# Patient Record
Sex: FEMALE | Race: WHITE | NOT HISPANIC OR LATINO | Employment: FULL TIME | URBAN - METROPOLITAN AREA
[De-identification: names, ages, dates, MRNs, and addresses within clinical notes are randomized per-mention and may not be internally consistent; named-entity substitution may affect disease eponyms.]

---

## 2017-03-27 ENCOUNTER — OFFICE VISIT (OUTPATIENT)
Dept: URGENT CARE | Age: 32
End: 2017-03-27
Payer: COMMERCIAL

## 2017-03-27 PROCEDURE — 99213 OFFICE O/P EST LOW 20 MIN: CPT | Performed by: FAMILY MEDICINE

## 2017-03-27 PROCEDURE — S9088 SERVICES PROVIDED IN URGENT: HCPCS | Performed by: FAMILY MEDICINE

## 2017-07-22 ENCOUNTER — TRANSCRIBE ORDERS (OUTPATIENT)
Dept: LAB | Facility: HOSPITAL | Age: 32
End: 2017-07-22

## 2017-07-22 ENCOUNTER — APPOINTMENT (OUTPATIENT)
Dept: LAB | Facility: HOSPITAL | Age: 32
End: 2017-07-22
Payer: COMMERCIAL

## 2017-07-22 DIAGNOSIS — E03.9 UNSPECIFIED HYPOTHYROIDISM: Primary | ICD-10-CM

## 2017-07-22 DIAGNOSIS — E03.9 UNSPECIFIED HYPOTHYROIDISM: ICD-10-CM

## 2017-07-22 DIAGNOSIS — Z00.8 HEALTH EXAMINATION IN POPULATION SURVEY: ICD-10-CM

## 2017-07-22 DIAGNOSIS — Z00.8 HEALTH EXAMINATION IN POPULATION SURVEY: Primary | ICD-10-CM

## 2017-07-22 LAB
CHOLEST SERPL-MCNC: 128 MG/DL (ref 50–200)
EST. AVERAGE GLUCOSE BLD GHB EST-MCNC: 111 MG/DL
HBA1C MFR BLD: 5.5 % (ref 4.2–6.3)
HDLC SERPL-MCNC: 43 MG/DL (ref 40–60)
LDLC SERPL CALC-MCNC: 69 MG/DL (ref 0–100)
TRIGL SERPL-MCNC: 80 MG/DL
TSH SERPL DL<=0.05 MIU/L-ACNC: 0.4 UIU/ML (ref 0.36–3.74)

## 2017-07-22 PROCEDURE — 36415 COLL VENOUS BLD VENIPUNCTURE: CPT

## 2017-07-22 PROCEDURE — 83036 HEMOGLOBIN GLYCOSYLATED A1C: CPT

## 2017-07-22 PROCEDURE — 80061 LIPID PANEL: CPT

## 2017-07-22 PROCEDURE — 84443 ASSAY THYROID STIM HORMONE: CPT

## 2017-07-28 ENCOUNTER — GENERIC CONVERSION - ENCOUNTER (OUTPATIENT)
Dept: OTHER | Facility: OTHER | Age: 32
End: 2017-07-28

## 2017-08-15 ENCOUNTER — ALLSCRIPTS OFFICE VISIT (OUTPATIENT)
Dept: OTHER | Facility: OTHER | Age: 32
End: 2017-08-15

## 2017-08-15 DIAGNOSIS — E03.9 HYPOTHYROIDISM: ICD-10-CM

## 2017-09-12 ENCOUNTER — LAB REQUISITION (OUTPATIENT)
Dept: LAB | Facility: HOSPITAL | Age: 32
End: 2017-09-12
Payer: COMMERCIAL

## 2017-09-12 DIAGNOSIS — Z01.419 ENCOUNTER FOR GYNECOLOGICAL EXAMINATION WITHOUT ABNORMAL FINDING: ICD-10-CM

## 2017-09-12 PROCEDURE — G0145 SCR C/V CYTO,THINLAYER,RESCR: HCPCS | Performed by: NURSE PRACTITIONER

## 2017-09-12 PROCEDURE — 87624 HPV HI-RISK TYP POOLED RSLT: CPT | Performed by: NURSE PRACTITIONER

## 2017-09-14 LAB — HPV RRNA GENITAL QL NAA+PROBE: NORMAL

## 2017-09-18 LAB
LAB AP GYN PRIMARY INTERPRETATION: NORMAL
LAB AP LMP: NORMAL
Lab: NORMAL

## 2017-10-02 DIAGNOSIS — E03.9 HYPOTHYROIDISM: ICD-10-CM

## 2017-10-02 DIAGNOSIS — M67.432 GANGLION OF LEFT WRIST: ICD-10-CM

## 2017-10-04 ENCOUNTER — TRANSCRIBE ORDERS (OUTPATIENT)
Dept: LAB | Facility: CLINIC | Age: 32
End: 2017-10-04

## 2017-10-04 ENCOUNTER — APPOINTMENT (OUTPATIENT)
Dept: LAB | Facility: CLINIC | Age: 32
End: 2017-10-04
Payer: COMMERCIAL

## 2017-10-04 DIAGNOSIS — E03.9 HYPOTHYROIDISM: ICD-10-CM

## 2017-10-04 LAB
ALBUMIN SERPL BCP-MCNC: 3.8 G/DL (ref 3.5–5)
ALP SERPL-CCNC: 70 U/L (ref 46–116)
ALT SERPL W P-5'-P-CCNC: 24 U/L (ref 12–78)
ANION GAP SERPL CALCULATED.3IONS-SCNC: 7 MMOL/L (ref 4–13)
AST SERPL W P-5'-P-CCNC: 23 U/L (ref 5–45)
BILIRUB SERPL-MCNC: 0.29 MG/DL (ref 0.2–1)
BUN SERPL-MCNC: 14 MG/DL (ref 5–25)
CALCIUM SERPL-MCNC: 9.1 MG/DL (ref 8.3–10.1)
CHLORIDE SERPL-SCNC: 105 MMOL/L (ref 100–108)
CO2 SERPL-SCNC: 25 MMOL/L (ref 21–32)
CREAT SERPL-MCNC: 0.84 MG/DL (ref 0.6–1.3)
ERYTHROCYTE [DISTWIDTH] IN BLOOD BY AUTOMATED COUNT: 12.5 % (ref 11.6–15.1)
GFR SERPL CREATININE-BSD FRML MDRD: 91 ML/MIN/1.73SQ M
GLUCOSE P FAST SERPL-MCNC: 84 MG/DL (ref 65–99)
HCT VFR BLD AUTO: 40.8 % (ref 34.8–46.1)
HGB BLD-MCNC: 13.3 G/DL (ref 11.5–15.4)
MCH RBC QN AUTO: 29.3 PG (ref 26.8–34.3)
MCHC RBC AUTO-ENTMCNC: 32.6 G/DL (ref 31.4–37.4)
MCV RBC AUTO: 90 FL (ref 82–98)
PLATELET # BLD AUTO: 249 THOUSANDS/UL (ref 149–390)
PMV BLD AUTO: 11.9 FL (ref 8.9–12.7)
POTASSIUM SERPL-SCNC: 4.2 MMOL/L (ref 3.5–5.3)
PROT SERPL-MCNC: 8.4 G/DL (ref 6.4–8.2)
RBC # BLD AUTO: 4.54 MILLION/UL (ref 3.81–5.12)
SODIUM SERPL-SCNC: 137 MMOL/L (ref 136–145)
TSH SERPL DL<=0.05 MIU/L-ACNC: 0.02 UIU/ML (ref 0.36–3.74)
WBC # BLD AUTO: 6.71 THOUSAND/UL (ref 4.31–10.16)

## 2017-10-04 PROCEDURE — 80053 COMPREHEN METABOLIC PANEL: CPT

## 2017-10-04 PROCEDURE — 85027 COMPLETE CBC AUTOMATED: CPT

## 2017-10-04 PROCEDURE — 36415 COLL VENOUS BLD VENIPUNCTURE: CPT

## 2017-10-04 PROCEDURE — 84443 ASSAY THYROID STIM HORMONE: CPT

## 2017-10-25 ENCOUNTER — ALLSCRIPTS OFFICE VISIT (OUTPATIENT)
Dept: OTHER | Facility: OTHER | Age: 32
End: 2017-10-25

## 2017-10-26 NOTE — PROGRESS NOTES
Assessment  1  Ganglion of left wrist (723 41) (Q11 626)    Plan  Ganglion of left wrist    · Continue with our present treatment plan ; Status:Complete;   Done: [id number]   · Follow-up visit in 2 months Evaluation and Treatment  Follow-up  Status: Complete   Done: 59ZOM7358   · US MSK GUIDANCE; Status:Active - Retrospective Authorization; Requested  [id number];     Discussion/Summary    Patient with ganglion cyst of left volar wrist, radial side  The anatomy and physiology of the ganglion was discussed with the patient today in the office  Fluid leaking out of the joint surface typically creates a small sac, which can enlarge and cause pain or limitation of motion  Treatment options include observation, aspiration, or surgical incision were discussed with the patient today  Observation typically lead to resolution and approximately 10% of patients, aspiration least resolution approximately 50% of patients, and surgical excision lead to resolution in approximately 97% of patients  After discussion with the patient today, the patient voiced understanding of all treatment options         this time, patient would like to proceed with aspiration  Because this is right along the area of the radial artery, we will have this aspirated under ultrasound guidance  Follow up in 2 months only if needed  Chief Complaint  1  Wrist Pain    History of Present Illness  HPI: Patient is a pleasant 68-year-old female who presents here today with complaints of a cyst on her left wrist  She states she has had this for approximately 3 years  Denies any obvious change in appearance  States he can occasionally cause some discomfort when pressure is applied to this area  Describes occasional bouts of numbness and tingling but has not had any of these recently  Review of Systems    Constitutional: No fever, no chills, feels well, no tiredness, no recent weight gain or loss  Eyes: No complaints of eyesight problems, no red eyes  ENT: no loss of hearing, no nosebleeds, no sore throat  Cardiovascular: No complaints of chest pain, no palpitations, no leg claudication or lower extremity edema  Respiratory: no compliants of shortness of breath, no wheezing, no cough  Gastrointestinal: no complaints of abdominal pain, no constipation, no nausea or diarrhea, no vomiting, no bloody stools  Genitourinary: no complaints of dysuria, no incontinence  Musculoskeletal: as noted in HPI  Integumentary: no complaints of skin rash or lesion, no itching or dry skin, no skin wounds  Neurological: no complaints of headache, no confusion, no numbness or tingling, no dizziness  Endocrine: No complaints of muscle weakness, no feelings of weakness, no frequent urination, no excessive thirst    Psychiatric: No suicidal thoughts, no anxiety, no feelings of depression  Active Problems  1  Ganglion of left wrist (727 41) (M67 432)   2  Hypothyroidism (244 9) (E03 9)   3  Insomnia (780 52) (G47 00)   4  Seizure disorder (345 90) (G40 909)    Past Medical History   · History of Encounter for pre-employment examination (V70 5) (Z02 1)   · History of gastroenteritis (V12 79) (Z87 19)   · History of seizure (V12 49) (J69 613)   · History of thyroid disease (V12 29) (Z86 39)   · History of viral gastroenteritis (V12 09) (Z86 19)   · History of Plantar fasciitis, right (728 71) (M72 2)    The active problems and past medical history were reviewed and updated today  Surgical History   · History of  Section   · History of Oral Surgery Tooth Extraction [place] Tooth    The surgical history was reviewed and updated today  Family History   · No pertinent family history   · No pertinent family history   · Family history of malignant neoplasm (V16 9) (Z80 9)   · Family history of diabetes mellitus (V18 0) (Z83 3)   · Family history of skin cancer (V16 8) (Z80 8)    The family history was reviewed and updated today         Social History   · Being A Social Drinker   · Caffeine Use   · Never a smoker   · No illicit drug use   · Uses Safety Equipment - Seatbelts  The social history was reviewed and updated today  The social history was reviewed and is unchanged  Current Meds   1  Levothyroxine Sodium 50 MCG Oral Tablet; TAKE 1 TABLET DAILY; Therapy: 45OIX3409 to (Evaluate:03Jan2018)  Requested for: 10XDV5496; Last   Rx:05Oct2017 Ordered    The medication list was reviewed and updated today  Allergies  1  No Known Drug Allergies  2  No Known Food Allergies   3  Seasonal    Vitals  Signs   Heart Rate: 68  Systolic: 040  Diastolic: 78  Height: 5 ft 3 in  Weight: 178 lb   BMI Calculated: 31 53  BSA Calculated: 1 84    Physical Exam      General: No acute distress, age-appropriate  Neck: Supple, trachea midline  HEENT: Normocephalic atraumatic, mucous membranes are moist, sclera are nonicteric  Cardiovascular: No discernable arrhythmia  Respiratory: Breathing is even and unlabored, no stridor or audible wheezing  Psychiatric: Awake alert and oriented x3, normal mood and affect  Abdomen: Without rebound or guarding  L Wrist Examination: (Patient with a mass approximately 1 to 1-1/2 cm in length along the volar radial aspect of her wrist  This is soft to palpation, non mobile  Patient is able to move her fingers and thumb without any obvious crepitation felt  Radial artery is felt just proximal to this and it seems to run right below this area)       Future Appointments    Date/Time Provider Specialty Site   12/13/2017 05:15 PM MARISABEL Okeefe   Orthopedic Surgery [address]   12/19/2017 09:30 AM Chico Servin MD Internal Medicine [facility]     Signatures   Electronically signed by : Charlette Nowak, [facility]; Oct 25 2017  9:18AM EST                       (Author)    Electronically signed by : MARISABEL Santana ; Oct 25 2017  1:04PM EST                       (Author)

## 2017-10-30 ENCOUNTER — ALLSCRIPTS OFFICE VISIT (OUTPATIENT)
Dept: OTHER | Facility: OTHER | Age: 32
End: 2017-10-30

## 2017-11-01 NOTE — PROGRESS NOTES
Assessment  1  Acute frontal sinusitis (461 1) (J01 10)    Plan  Acute frontal sinusitis    · Amoxicillin-Pot Clavulanate 875-125 MG Oral Tablet; Take one tablet twice daily for  7 days   · Apply warm moist compresses to the affected area 3 times a day for 5 minutes ;  Status:Complete;   Done: 33SKW5876   · Drink at least 6 glasses of water or juice a day ; Status:Complete;   Done: 88MUC7421   · How to use a nasal spray ; Status:Complete;   Done: 86DSR0326   · Irrigate your nose twice a day ; Status:Complete;   Done: 80IKZ9463   · Taking a hot steamy shower may help your condition ; Status:Complete;   Done:  63HPU5147   · Call [phone] if: The fever has not gone away in 2 days ; Status:Complete;   Done:  60LJF5642   · Call [phone] if: The sinus pain is not better in 1 week ; Status:Complete;   Done:  35YBL7596   · Call [phone] if: The symptoms are not better in 7 days ; Status:Complete;   Done:  35CCS7742   · Call [phone] if: The symptoms come back after the medications are finished ;  Status:Complete;   Done: 60Hec7665   · Call [phone] if: Your sinus pain is worse ; Status:Complete;   Done: 49IDU0067   · Seek Immediate Medical Attention if: You have a fever, headache, and vomiting, or have a  stiff neck ; Status:Complete;   Done: 47RFG3072   · Seek Immediate Medical Attention if: You have a severe headache that will not go away ;  Status:Complete;   Done: 30Oct2017   · Seek Immediate Medical Attention if: You have signs of infection in or around the affected  area ; Status:Complete;   Done: 31KRX4798    Discussion/Summary    Rest  Take full course abx  stay well hydrated  warm compress to sinus  return for new/worsening s/sx  The patient was counseled regarding instructions for management,-- risk factor reductions,-- prognosis,-- patient and family education        Chief Complaint  Pt presents for sinus pressure, cough, headache, always coldlast Saturday      History of Present Illness  HPI: Pt here for sinus congestion  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/sx  note, pt did get her influenza vaccine this year  Cold Symptoms:   Raymundo Soni presents with complaints of gradual onset of constant episodes of mild cold symptoms Episodes started October 28, 2017 (Pt states her son and  have also had s/sx)   Associated symptoms include dry cough,-- facial pressure,-- facial pain-- and-- headache, but-- no runny nose,-- no scratchy throat,-- no sore throat,-- no productive cough,-- no ear pain,-- no nausea,-- no vomiting,-- no fever-- and-- no chills  The patient presents with complaints of nasal congestion (yellow)      Review of Systems    Constitutional: feeling poorly-- and-- feeling tired, but-- no fever-- and-- no chills  ENT: as noted in HPI  Cardiovascular: no chest pain  Respiratory: cough, but-- no shortness of breath  Gastrointestinal: no nausea-- and-- no vomiting  Musculoskeletal: no arthralgias-- and-- no myalgias  Integumentary: no rashes  Neurological: headache  Active Problems  1  Ganglion of left wrist (727 41) (M67 432)   2  Hypothyroidism (244 9) (E03 9)   3  Insomnia (780 52) (G47 00)   4  Seizure disorder (345 90) (G40 909)    Past Medical History  Active Problems And Past Medical History Reviewed: The active problems and past medical history were reviewed and updated today  Surgical History  Surgical History Reviewed: The surgical history was reviewed and updated today  Social History   · Being A Social Drinker   · Caffeine Use   · Never a smoker   · No illicit drug use   · Uses Safety Equipment - Seatbelts  The social history was reviewed and updated today  The social history was reviewed and is unchanged  Family History  Family History Reviewed: The family history was reviewed and updated today  Current Meds   1   Levothyroxine Sodium 50 MCG Oral Tablet; TAKE 1 TABLET DAILY; Therapy: 79JVQ1488 to (Evaluate:03Jan2018)  Requested for: 50DEH7172; Last   Rx:05Oct2017 Ordered    The medication list was reviewed and updated today  Allergies  1  No Known Drug Allergies  2  No Known Food Allergies   3  Seasonal    Vitals   Recorded: 14SIO0039 01:26PM   Temperature 99 2 F, Tympanic   Heart Rate 70   Systolic 246, LUE, Sitting   Diastolic 78, LUE, Sitting   BP CUFF SIZE Large   Height 5 ft 3 in   Weight 176 lb 12 8 oz   BMI Calculated 31 32   BSA Calculated 1 84   O2 Saturation 98, RA     Physical Exam    Constitutional   General appearance: No acute distress, well appearing and well nourished  Eyes   Conjunctiva and lids: No swelling, erythema or discharge  Pupils and irises: Equal, round and reactive to light  Ears, Nose, Mouth, and Throat   External inspection of ears and nose: Normal     Otoscopic examination: Tympanic membranes translucent with normal light reflex  Canals patent without erythema  Nasal mucosa, septum, and turbinates: Abnormal   There was a mucoid discharge from both nares  The bilateral nasal mucosa was boggy,-- crusted-- and-- edematous  -- + TTP to frontal sinus  Oropharynx: Abnormal  -- + PND  + cobblestone irritation  no distinct erythema or exudate  + MMM  Pulmonary   Respiratory effort: No increased work of breathing or signs of respiratory distress  Auscultation of lungs: Clear to auscultation  -- no rhonchi or wheezing  Cardiovascular   Auscultation of heart: Normal rate and rhythm, normal S1 and S2, without murmurs  Lymphatic   Palpation of lymph nodes in neck: No lymphadenopathy  Skin   Skin and subcutaneous tissue: Normal without rashes or lesions  Psychiatric   Orientation to person, place, and time: Normal     Mood and affect: Normal          Future Appointments    Date/Time Provider Specialty Site   12/13/2017 05:15 PM MARISABEL Alarcon   Orthopedic Surgery [facility] [place]   12/19/2017 09:30 AM Arben Dorado MD Internal Medicine [facility]     Signatures   Electronically signed by : Edvin Fitch, [address]; Oct 30 2017  2:10PM EST                       (Author)    Electronically signed by :  Emerita Matthew MD; Oct 31 2017  9:01AM EST                       (Author)

## 2017-11-02 ENCOUNTER — HOSPITAL ENCOUNTER (OUTPATIENT)
Dept: RADIOLOGY | Facility: HOSPITAL | Age: 32
Discharge: HOME | End: 2017-11-02
Payer: COMMERCIAL

## 2017-11-02 DIAGNOSIS — M67.432 GANGLION OF LEFT WRIST: ICD-10-CM

## 2017-11-02 PROCEDURE — 20604 DRAIN/INJ JOINT/BURSA W/US: CPT

## 2017-11-02 PROCEDURE — 76942 ECHO GUIDE FOR BIOPSY: CPT

## 2017-11-02 PROCEDURE — 76881 US COMPL JOINT R-T W/IMG: CPT

## 2017-11-02 RX ORDER — LIDOCAINE HYDROCHLORIDE 10 MG/ML
5 INJECTION, SOLUTION INFILTRATION; PERINEURAL ONCE
Status: COMPLETED | OUTPATIENT
Start: 2017-11-02 | End: 2017-11-02

## 2017-11-02 RX ADMIN — LIDOCAINE HYDROCHLORIDE 5 ML: 10 INJECTION, SOLUTION INFILTRATION; PERINEURAL at 08:36

## 2017-12-01 DIAGNOSIS — E03.9 HYPOTHYROIDISM: ICD-10-CM

## 2018-01-03 ENCOUNTER — APPOINTMENT (OUTPATIENT)
Dept: LAB | Facility: CLINIC | Age: 33
End: 2018-01-03
Payer: COMMERCIAL

## 2018-01-03 ENCOUNTER — TRANSCRIBE ORDERS (OUTPATIENT)
Dept: LAB | Facility: CLINIC | Age: 33
End: 2018-01-03

## 2018-01-03 ENCOUNTER — GENERIC CONVERSION - ENCOUNTER (OUTPATIENT)
Dept: OTHER | Facility: OTHER | Age: 33
End: 2018-01-03

## 2018-01-03 DIAGNOSIS — E03.9 HYPOTHYROIDISM: ICD-10-CM

## 2018-01-03 LAB
T4 FREE SERPL-MCNC: 0.84 NG/DL (ref 0.76–1.46)
TSH SERPL DL<=0.05 MIU/L-ACNC: 4.36 UIU/ML (ref 0.36–3.74)

## 2018-01-03 PROCEDURE — 84443 ASSAY THYROID STIM HORMONE: CPT

## 2018-01-03 PROCEDURE — 36415 COLL VENOUS BLD VENIPUNCTURE: CPT

## 2018-01-03 PROCEDURE — 84439 ASSAY OF FREE THYROXINE: CPT

## 2018-01-10 NOTE — CONSULTS
Chief Complaint    1  Wrist Pain    History of Present Illness  HPI: Patient is a pleasant 77-year-old female who presents here today with complaints of a cyst on her left wrist  She states she has had this for approximately 3 years  Denies any obvious change in appearance  States he can occasionally cause some discomfort when pressure is applied to this area  Describes occasional bouts of numbness and tingling but has not had any of these recently  Review of Systems    Constitutional: No fever, no chills, feels well, no tiredness, no recent weight gain or loss  Eyes: No complaints of eyesight problems, no red eyes  ENT: no loss of hearing, no nosebleeds, no sore throat  Cardiovascular: No complaints of chest pain, no palpitations, no leg claudication or lower extremity edema  Respiratory: no compliants of shortness of breath, no wheezing, no cough  Gastrointestinal: no complaints of abdominal pain, no constipation, no nausea or diarrhea, no vomiting, no bloody stools  Genitourinary: no complaints of dysuria, no incontinence  Musculoskeletal: as noted in HPI  Integumentary: no complaints of skin rash or lesion, no itching or dry skin, no skin wounds  Neurological: no complaints of headache, no confusion, no numbness or tingling, no dizziness  Endocrine: No complaints of muscle weakness, no feelings of weakness, no frequent urination, no excessive thirst    Psychiatric: No suicidal thoughts, no anxiety, no feelings of depression  Active Problems    1  Ganglion of left wrist (727 41) (M67 432)   2  Hypothyroidism (244 9) (E03 9)   3  Insomnia (780 52) (G47 00)   4   Seizure disorder (345 90) (G40 909)    Past Medical History    · History of Encounter for pre-employment examination (V70 5) (Z02 1)   · History of gastroenteritis (V12 79) (Z87 19)   · History of seizure (V12 49) (Y78 714)   · History of thyroid disease (V12 29) (Z86 39)   · History of viral gastroenteritis (V12 09) (Z86 19)   · History of Plantar fasciitis, right (728 71) (M72 2)    The active problems and past medical history were reviewed and updated today  Surgical History    · History of  Section   · History of Oral Surgery Tooth Extraction [place] Tooth    The surgical history was reviewed and updated today  Family History    · No pertinent family history    · No pertinent family history    · Family history of malignant neoplasm (V16 9) (Z80 9)    · Family history of diabetes mellitus (V18 0) (Z83 3)    · Family history of skin cancer (V16 8) (Z80 8)    The family history was reviewed and updated today  Social History    · Being A Social Drinker   · Caffeine Use   · Never a smoker   · No illicit drug use   · Uses Safety Equipment - Seatbelts  The social history was reviewed and updated today  The social history was reviewed and is unchanged  Current Meds   1  Levothyroxine Sodium 50 MCG Oral Tablet; TAKE 1 TABLET DAILY; Therapy: 09KTT9364 to (Evaluate:2018)  Requested for: 19ZQY9770; Last   Rx:2017 Ordered    The medication list was reviewed and updated today  Allergies    1  No Known Drug Allergies    2  No Known Food Allergies   3  Seasonal    Vitals  Signs    Heart Rate: 17DYIOMZYZ: 392BSSTKNWJK: 28JLZZPZ: 5 ft 3 inWeight: 178 lb BMI Calculated: 31 53BSA Calculated: 1 84    Physical Exam      General: No acute distress, age-appropriate  Neck: Supple, trachea midline  HEENT: Normocephalic atraumatic, mucous membranes are moist, sclera are nonicteric  Cardiovascular: No discernable arrhythmia  Respiratory: Breathing is even and unlabored, no stridor or audible wheezing  Psychiatric: Awake alert and oriented x3, normal mood and affect  Abdomen: Without rebound or guarding  L Wrist Examination: (Patient with a mass approximately 1 to 1-1/2 cm in length along the volar radial aspect of her wrist  This is soft to palpation, non mobile   Patient is able to move her fingers and thumb without any obvious crepitation felt  Radial artery is felt just proximal to this and it seems to run right below this area)       Assessment    1  Ganglion of left wrist (727 41) (Q98 446)    Plan     Ganglion of left wrist    · Continue with our present treatment plan ; Status:Complete;   Done: 67YIW4304   · Follow-up visit in 2 months Evaluation and Treatment  Follow-up  Status: Complete   Done: 68HEY7354   · US MSK GUIDANCE; Status:Active - Retrospective Authorization; Requested  [id number];     Discussion/Summary    Patient with ganglion cyst of left volar wrist, radial side  The anatomy and physiology of the ganglion was discussed with the patient today in the office  Fluid leaking out of the joint surface typically creates a small sac, which can enlarge and cause pain or limitation of motion  Treatment options include observation, aspiration, or surgical incision were discussed with the patient today  Observation typically lead to resolution and approximately 10% of patients, aspiration least resolution approximately 50% of patients, and surgical excision lead to resolution in approximately 97% of patients  After discussion with the patient today, the patient voiced understanding of all treatment options            At this time, patient would like to proceed with aspiration  Because this is right along the area of the radial artery, we will have this aspirated under ultrasound guidance  Follow up in 2 months only if needed        Signatures   Electronically signed by : Félix Burrell, [facility]; Oct 25 2017  9:18AM EST                       (Author)    Electronically signed by : MARISABEL Lane ; Oct 25 2017  1:04PM EST                       (Author)

## 2018-01-10 NOTE — MISCELLANEOUS
Message  Return to work or school:   Joslyn Saravia is under my professional care  She was seen in my office on   She is able to return to work on  11/1/17            Signatures   Electronically signed by :  HARSHA Armendariz; Oct 30 2017  2:05PM EST                       (Author)

## 2018-01-11 NOTE — RESULT NOTES
Verified Results  (1) TSH 75YJF7362 08:48AM Lilian Skaggs Order Number: [id number]     Test Name Result Flag Reference   TSH 1 460 uIU/mL  0 358-3 740   - Patient Instructions: This bloodwork is non-fasting  Please drink two glasses of water morning of bloodwork  Patients undergoing fluorescein dye angiography may retain small amounts of fluorescein in the body for 48-72 hours post procedure  Samples containing fluorescein can produce falsely depressed TSH values  If the patient had this procedure,a specimen should be resubmitted post fluorescein clearance            The recommended reference ranges for TSH during pregnancy are as follows:  First trimester 0 1 to 2 5 uIU/mL  Second trimester  0 2 to 3 0 uIU/mL  Third trimester 0 3 to 3 0 uIU/m

## 2018-01-14 VITALS
HEIGHT: 63 IN | OXYGEN SATURATION: 96 % | HEART RATE: 66 BPM | SYSTOLIC BLOOD PRESSURE: 118 MMHG | WEIGHT: 178.4 LBS | BODY MASS INDEX: 31.61 KG/M2 | DIASTOLIC BLOOD PRESSURE: 80 MMHG | TEMPERATURE: 99.4 F

## 2018-01-14 VITALS
DIASTOLIC BLOOD PRESSURE: 78 MMHG | SYSTOLIC BLOOD PRESSURE: 130 MMHG | HEIGHT: 63 IN | BODY MASS INDEX: 31.54 KG/M2 | WEIGHT: 178 LBS | HEART RATE: 68 BPM

## 2018-01-14 NOTE — RESULT NOTES
Message   Recommend increasing thyroid medication dose to 75 Âµg daily  Recheck TSH again in 4 weeks  Verified Results  (1) TSH 59Hjs0017 08:14AM [facility] Order Number: [id number]     Test Name Result Flag Reference   TSH 7 860 uIU/mL H 0 358-3 740   - Patient Instructions: This bloodwork is non-fasting  Please drink two glasses of water morning of bloodwork  Patients undergoing fluorescein dye angiography may retain small amounts of fluorescein in the body for 48-72 hours post procedure  Samples containing fluorescein can produce falsely depressed TSH values  If the patient had this procedure,a specimen should be resubmitted post fluorescein clearance            The recommended reference ranges for TSH during pregnancy are as follows:  First trimester 0 1 to 2 5 uIU/mL  Second trimester  0 2 to 3 0 uIU/mL  Third trimester 0 3 to 3 0 uIU/m       Plan  Hypothyroidism    · From  Levothyroxine Sodium 50 MCG Oral Tablet TAKE 1 TABLET DAILY To  Levothyroxine Sodium 75 MCG Oral Tablet TAKE 1 TABLET DAILY

## 2018-01-14 NOTE — RESULT NOTES
Discussion/Summary   Thyroid levels are in normal range  Verified Results  (1) TSH WITH FT4 REFLEX [id number] 08:42AM Estefany Melissa     Test Name Result Flag Reference   TSH 0 396 uIU/mL  0 358-3 740   Patients undergoing fluorescein dye angiography may retain small amounts of fluorescein in the body for 48-72 hours post procedure  Samples containing fluorescein can produce falsely depressed TSH values  If the patient had this procedure,a specimen should be resubmitted post fluorescein clearance            The recommended reference ranges for TSH during pregnancy are as follows:  First trimester 0 1 to 2 5 uIU/mL  Second trimester  0 2 to 3 0 uIU/mL  Third trimester 0 3 to 3 0 uIU/m

## 2018-01-15 VITALS
OXYGEN SATURATION: 98 % | BODY MASS INDEX: 31.33 KG/M2 | SYSTOLIC BLOOD PRESSURE: 120 MMHG | HEART RATE: 70 BPM | HEIGHT: 63 IN | TEMPERATURE: 99.2 F | WEIGHT: 176.8 LBS | DIASTOLIC BLOOD PRESSURE: 78 MMHG

## 2018-01-15 NOTE — CONSULTS
Chief Complaint    1  Wrist Pain    History of Present Illness  HPI: Patient is a pleasant 58-year-old female who presents here today with complaints of a cyst on her left wrist  She states she has had this for approximately 3 years  Denies any obvious change in appearance  States he can occasionally cause some discomfort when pressure is applied to this area  Describes occasional bouts of numbness and tingling but has not had any of these recently  Review of Systems  Focused-Female Orthopedics:   Constitutional: No fever, no chills, feels well, no tiredness, no recent weight gain or loss  Eyes: No complaints of eyesight problems, no red eyes  ENT: no loss of hearing, no nosebleeds, no sore throat  Cardiovascular: No complaints of chest pain, no palpitations, no leg claudication or lower extremity edema  Respiratory: no compliants of shortness of breath, no wheezing, no cough  Gastrointestinal: no complaints of abdominal pain, no constipation, no nausea or diarrhea, no vomiting, no bloody stools  Genitourinary: no complaints of dysuria, no incontinence  Musculoskeletal: as noted in HPI  Integumentary: no complaints of skin rash or lesion, no itching or dry skin, no skin wounds  Neurological: no complaints of headache, no confusion, no numbness or tingling, no dizziness  Endocrine: No complaints of muscle weakness, no feelings of weakness, no frequent urination, no excessive thirst    Psychiatric: No suicidal thoughts, no anxiety, no feelings of depression  Active Problems    1  Ganglion of left wrist (727 41) (M67 432)   2  Hypothyroidism (244 9) (E03 9)   3  Insomnia (780 52) (G47 00)   4  Seizure disorder (345 90) (G40 909)    Past Medical History    1  History of Encounter for pre-employment examination (V70 5) (Z02 1)   2  History of gastroenteritis (V12 79) (Z87 19)   3  History of seizure (V12 49) (Z87 898)   4  History of thyroid disease (V12 29) (Z86 39)   5   History of viral gastroenteritis (V12 09) (Z86 19)   6  History of Plantar fasciitis, right (728 71) (M72 2)  Active Problems And Past Medical History Reviewed: The active problems and past medical history were reviewed and updated today  Surgical History    1  History of  Section   2  History of Oral Surgery Tooth Extraction [place] Tooth  Surgical History Reviewed: The surgical history was reviewed and updated today  Family History    1  No pertinent family history    2  No pertinent family history    3  Family history of malignant neoplasm (V16 9) (Z80 9)    4  Family history of diabetes mellitus (V18 0) (Z83 3)    5  Family history of skin cancer (V16 8) (Z80 8)  Family History Reviewed: The family history was reviewed and updated today  Social History    · Being A Social Drinker   · Caffeine Use   · Never a smoker   · No illicit drug use   · Uses Safety Equipment - Seatbelts  Social History Reviewed: The social history was reviewed and updated today  The social history was reviewed and is unchanged  Current Meds   1  Levothyroxine Sodium 50 MCG Oral Tablet; TAKE 1 TABLET DAILY; Therapy: [id number] to (Evaluate:2018)  Requested for: 43YHN4158; Last   Rx:2017 Ordered  Medication List Reviewed: The medication list was reviewed and updated today  Allergies    1  No Known Drug Allergies    2  No Known Food Allergies   3  Seasonal    Vitals  Signs   Recorded: 95DUW5382 08:38AM   Heart Rate: 68  Systolic: 194  Diastolic: 78  Height: 5 ft 3 in  Weight: 178 lb   BMI Calculated: 31 53  BSA Calculated: 1 84    Physical Exam      General: No acute distress, age-appropriate  Neck: Supple, trachea midline  HEENT: Normocephalic atraumatic, mucous membranes are moist, sclera are nonicteric  Cardiovascular: No discernable arrhythmia  Respiratory: Breathing is even and unlabored, no stridor or audible wheezing  Psychiatric: Awake alert and oriented x3, normal mood and affect  Abdomen: Without rebound or guarding  L Wrist Examination: (Patient with a mass approximately 1 to 1-1/2 cm in length along the volar radial aspect of her wrist  This is soft to palpation, non mobile  Patient is able to move her fingers and thumb without any obvious crepitation felt  Radial artery is felt just proximal to this and it seems to run right below this area)       Assessment    1  Ganglion of left wrist (727 41) (W23 839)    Plan  Ganglion of left wrist    1  Continue with our present treatment plan ; Status:Complete;   Done: 15HSM9271   2  Follow-up visit in 2 months Evaluation and Treatment  Follow-up  Status: Complete    Done: 11PHQ9257   3  US MSK GUIDANCE; Status:Active - Retrospective Authorization; Requested   BBL:[id number];     Discussion/Summary  Discussion Summary:   Patient with ganglion cyst of left volar wrist, radial side  The anatomy and physiology of the ganglion was discussed with the patient today in the office  Fluid leaking out of the joint surface typically creates a small sac, which can enlarge and cause pain or limitation of motion  Treatment options include observation, aspiration, or surgical incision were discussed with the patient today  Observation typically lead to resolution and approximately 10% of patients, aspiration least resolution approximately 50% of patients, and surgical excision lead to resolution in approximately 97% of patients  After discussion with the patient today, the patient voiced understanding of all treatment options            At this time, patient would like to proceed with aspiration  Because this is right along the area of the radial artery, we will have this aspirated under ultrasound guidance  Follow up in 2 months only if needed        Future Appointments    Signatures   Electronically signed by : Kenton Sher, [facility]; Oct 25 2017  9:18AM EST                       (Author)    Electronically signed by : MARISABEL Traylor ; Oct 25 2017 1:04PM EST                       (Author)

## 2018-01-15 NOTE — PROGRESS NOTES
Assessment    1  Encounter for preventive health examination (V70 0) (Z00 00)    Plan  Hypothyroidism    · (1) TSH; Status:Active; Requested for:01Nov2016;     Discussion/Summary    Recommend recheck on TSH in 3 months  Prescription given to patient  Recommend annual follow-up with gynecologist       Chief Complaint  KP physical      History of Present Illness  HM, Adult Female: The patient is being seen for a health maintenance evaluation  General Health: The patient's health since the last visit is described as good  She has regular dental visits  She denies vision problems  She denies hearing loss  Immunizations status: up to date  Lifestyle:  She consumes a diverse and healthy diet  She does not have any weight concerns  She exercises regularly  She does not use tobacco  She denies alcohol use  She denies drug use  Reproductive health:  she reports normal menses  Screening: cancer screening reviewed and current  metabolic screening reviewed and current  risk screening reviewed and current  HPI: Patient is here today for insurance physical/well check  She generally feeling well  No significant concerns or complaints today  Review of Systems    Constitutional: No fever, no chills, feels well, no tiredness, no recent weight gain or weight loss  Eyes: No complaints of eye pain, no red eyes, no eyesight problems, no discharge, no dry eyes, no itching of eyes  ENT: no complaints of earache, no loss of hearing, no nose bleeds, no nasal discharge, no sore throat, no hoarseness  Cardiovascular: No complaints of slow heart rate, no fast heart rate, no chest pain, no palpitations, no leg claudication, no lower extremity edema  Respiratory: No complaints of shortness of breath, no wheezing, no cough, no SOB on exertion, no orthopnea, no PND  Gastrointestinal: No complaints of abdominal pain, no constipation, no nausea or vomiting, no diarrhea, no bloody stools     Genitourinary: No complaints of dysuria, no incontinence, no pelvic pain, no dysmenorrhea, no vaginal discharge or bleeding  Musculoskeletal: No complaints of arthralgias, no myalgias, no joint swelling or stiffness, no limb pain or swelling  Integumentary: No complaints of skin rash or lesions, no itching, no skin wounds, no breast pain or lump  Neurological: No complaints of headache, no confusion, no convulsions, no numbness, no dizziness or fainting, no tingling, no limb weakness, no difficulty walking  Psychiatric: Not suicidal, no sleep disturbance, no anxiety or depression, no change in personality, no emotional problems  Endocrine: No complaints of proptosis, no hot flashes, no muscle weakness, no deepening of the voice, no feelings of weakness  Hematologic/Lymphatic: No complaints of swollen glands, no swollen glands in the neck, does not bleed easily, does not bruise easily  Active Problems    1  Encounter for pre-employment examination (V70 5) (Z02 1)   2  Ganglion of left wrist (727 41) (M67 432)   3  Gastroenteritis (558 9) (K52 9)   4  Hypothyroidism (244 9) (E03 9)   5  Insomnia (780 52) (G47 00)   6  Plantar fasciitis, right (728 71) (M72 2)   7  Seizure disorder (345 90) (G40 909)    Family History  Mother    · No pertinent family history  Father    · No pertinent family history    Social History    · Being A Social Drinker   · Caffeine Use   · Never a smoker   · Uses Safety Equipment - Seatbelts    Current Meds   1  Levothyroxine Sodium 75 MCG Oral Tablet; TAKE 1 TABLET DAILY  Requested for:   93DAX3426; Last Rx:[id number] Ordered   2  Prenatal Vitamin TABS; Therapy: (Recorded:29Oct2015) to Recorded    Allergies    1   No Known Drug Allergies    Vitals   Recorded: 92FFW5382 88:02VD   Systolic 879   Diastolic 68   Temperature 97 5 F   Height 5 ft 3 5 in   Weight 167 lb    BMI Calculated 29 12   BSA Calculated 1 8     Physical Exam    Constitutional   General appearance: No acute distress, well appearing and well nourished  Eyes   Conjunctiva and lids: No swelling, erythema or discharge  Pupils and irises: Equal, round and reactive to light  Ears, Nose, Mouth, and Throat   External inspection of ears and nose: Normal     Otoscopic examination: Tympanic membranes translucent with normal light reflex  Canals patent without erythema  Oropharynx: Normal with no erythema, edema, exudate or lesions  Pulmonary   Respiratory effort: No increased work of breathing or signs of respiratory distress  Auscultation of lungs: Clear to auscultation  Cardiovascular   Palpation of heart: Normal PMI, no thrills  Auscultation of heart: Normal rate and rhythm, normal S1 and S2, without murmurs  Examination of extremities for edema and/or varicosities: Normal     Abdomen   Abdomen: Non-tender, no masses  Liver and spleen: No hepatomegaly or splenomegaly  Lymphatic   Palpation of lymph nodes in neck: No lymphadenopathy  Musculoskeletal   Gait and station: Normal     Digits and nails: Normal without clubbing or cyanosis  Inspection/palpation of joints, bones, and muscles: Normal     Skin   Skin and subcutaneous tissue: Normal without rashes or lesions  Neurologic   Cranial nerves: Cranial nerves 2-12 intact  Reflexes: 2+ and symmetric  Sensation: No sensory loss      Psychiatric   Orientation to person, place, and time: Normal     Mood and affect: Normal        Signatures   Electronically signed by : Robert Cr DO; Nov 1 2016  4:19PM EST                       (Author)

## 2018-01-18 ENCOUNTER — ALLSCRIPTS OFFICE VISIT (OUTPATIENT)
Dept: OTHER | Facility: OTHER | Age: 33
End: 2018-01-18

## 2018-01-18 DIAGNOSIS — E03.9 HYPOTHYROIDISM: ICD-10-CM

## 2018-01-19 NOTE — PROGRESS NOTES
Assessment   1  Hypothyroidism (244 9) (E03 9)   2  Seizure disorder (345 90) (G40 909)   · Most recent seizure in   Plan   Hypothyroidism    · Levothyroxine Sodium 75 MCG Oral Tablet; TAKE 1 TABLET DAILY   · (1) TSH WITH FT4 REFLEX; Status:Active; Requested [id number]; Discussion/Summary   Discussion Summary:    Hypothyroid: TSH elevated and with some fatigue  WIll increase levothyroxine to 75mcg and will repeat TSH in 6 weeks  stable  last seizure was 6-7 years ago  no medications  does not follow with a neurologist            Counseling Documentation With Imm: The patient was counseled regarding diagnostic results,-- instructions for management,-- risk factor reductions,-- prognosis,-- patient and family education,-- impressions  Chief Complaint   Chief Complaint Free Text Note Form: pt  presents for follow up for hypothyroidism  Review Labs 1/3/18  pt  would like Med refills      History of Present Illness   HPI: Pt here for f/u for hypothyroid and to review labs  also with hx of seizures - last seizure was approx 6-7 years ago  Pt is not currently on medication, and was not previously in the past  Pt reports that she had approx a dozen seizures in her lifetime - started at 11-9 years old  Hypothyroidism (Follow-Up): The patient is being seen for follow-up of hypothyroidism of undetermined etiology  The patient reports doing well  She has had no significant interval events  Interval symptoms:  denies weight gain,-- denies cold intolerance,-- worsened fatigue,-- denies constipation,-- denies menorrhagia,-- denies dyspnea on exertion-- and-- denies trouble concentrating  Associated symptoms: no myalgias,-- no depression-- and-- no palpitations  Medications include levothyroxine  Medications:  the patient is adherent to her medication regimen, but-- she denies medication side effects        Review of Systems   Complete-Female:      Constitutional: no fever,-- not feeling poorly,-- no chills-- and-- not feeling tired  ENT: no sore throat,-- no hearing loss-- and-- no nasal discharge  Cardiovascular: the heart rate was not slow,-- no chest pain,-- the heart rate was not fast-- and-- no palpitations  Respiratory: no shortness of breath,-- no cough-- and-- no wheezing  Gastrointestinal: no abdominal pain,-- no nausea,-- no vomiting-- and-- no diarrhea  Musculoskeletal: no arthralgias-- and-- no myalgias  Integumentary: no rashes  Neurological: no headache,-- no dizziness-- and-- no fainting  Psychiatric: no anxiety-- and-- no depression  Active Problems   1  Ganglion of left wrist (727 41) (M67 432)   2  Hypothyroidism (244 9) (E03 9)   3  Insomnia (780 52) (G47 00)   4  Seizure disorder (345 90) (G40 909)    Past Medical History   1  History of Acute frontal sinusitis (461 1) (J01 10)   2  History of Encounter for pre-employment examination (V70 5) (Z02 1)   3  History of gastroenteritis (V12 79) (Z87 19)   4  History of seizure (V12 49) (Z87 898)   5  History of thyroid disease (V12 29) (Z86 39)   6  History of viral gastroenteritis (V12 09) (Z86 19)   7  History of Plantar fasciitis, right (728 71) (M72 2)  Active Problems And Past Medical History Reviewed: The active problems and past medical history were reviewed and updated today  Surgical History   1  History of  Section   2  History of Oral Surgery Tooth Extraction [place] Tooth  Surgical History Reviewed: The surgical history was reviewed and updated today  Family History   Mother    1  No pertinent family history  Father    2  No pertinent family history  Maternal Grandmother    3  Family history of malignant neoplasm (V16 9) (Z80 9)  Paternal Grandmother    4  Family history of diabetes mellitus (V18 0) (Z83 3)  Maternal Grandfather    5  Family history of skin cancer (V16 8) (Z80 8)  Family History Reviewed:     The family history was reviewed and updated today  Social History    · Being A Social Drinker   · Caffeine Use   · Never a smoker   · No illicit drug use   · Uses Safety Equipment - Seatbelts  Social History Reviewed: The social history was reviewed and updated today  The social history was reviewed and is unchanged  Current Meds    1  Levothyroxine Sodium 50 MCG Oral Tablet; TAKE 1 TABLET DAILY; Therapy: [id number] to (Evaluate:03Jan2018)  Requested for: 95HYX4969; Last Rx:05Oct2017     Ordered  Medication List Reviewed: The medication list was reviewed and updated today  Allergies   1  No Known Drug Allergies  2  No Known Food Allergies   3  Seasonal    Vitals   Vital Signs    Recorded: 82JIB1977 09:18AM   Temperature 98 4 F, Tympanic   Heart Rate 79   Systolic 771, LUE, Sitting   Diastolic 78, LUE, Sitting   Height 5 ft 3 in   Weight 181 lb 4 oz   BMI Calculated 32 11   BSA Calculated 1 85   O2 Saturation 99, RA     Physical Exam        Constitutional      General appearance: No acute distress, well appearing and well nourished  Pulmonary      Respiratory effort: No increased work of breathing or signs of respiratory distress  Auscultation of lungs: Clear to auscultation  -- No rhonchi or wheezing  Cardiovascular      Auscultation of heart: Normal rate and rhythm, normal S1 and S2, without murmurs  Lymphatic      Palpation of lymph nodes in neck: No lymphadenopathy  Musculoskeletal      Gait and station: Normal        Skin      Skin and subcutaneous tissue: Normal without rashes or lesions  Neurologic no focal deficits  Psychiatric      Orientation to person, place, and time: Normal        Mood and affect: Normal        Additional Exam:  thyroid: no mass  no enlargement           Results/Data   PHQ-2 Adult Depression Screening 79CYV0802 09:21AM User, Ahs      Test Name Result Flag Reference   PHQ-2 Adult Depression Score 0     Over the last two weeks, how often have you been bothered by any of the following problems? Little interest or pleasure in doing things: Not at all - 0     Feeling down, depressed, or hopeless: Not at all - 0   PHQ-2 Adult Depression Screening Negative          Future Appointments      Date/Time Provider Specialty Site   03/15/2018 01:00 PM MARISABEL Bledsoe  [address] [place] [address] OR   03/23/2018 12:30 PM MARISABEL Bledsoe  Orthopedic Surgery [address]     Signatures    Electronically signed by :  [facility]; Jan 18 2018  9:55AM EST                       (Author)     Electronically signed by : Barbara Martel DO; Jan 18 2018  5:37PM EST

## 2018-01-22 VITALS
WEIGHT: 181.25 LBS | HEART RATE: 79 BPM | DIASTOLIC BLOOD PRESSURE: 78 MMHG | SYSTOLIC BLOOD PRESSURE: 114 MMHG | TEMPERATURE: 98.4 F | HEIGHT: 63 IN | BODY MASS INDEX: 32.11 KG/M2 | OXYGEN SATURATION: 99 %

## 2018-01-24 VITALS
HEART RATE: 70 BPM | HEIGHT: 63 IN | BODY MASS INDEX: 32.6 KG/M2 | WEIGHT: 184 LBS | SYSTOLIC BLOOD PRESSURE: 128 MMHG | DIASTOLIC BLOOD PRESSURE: 77 MMHG

## 2018-01-25 ENCOUNTER — OFFICE VISIT (OUTPATIENT)
Dept: INTERNAL MEDICINE CLINIC | Age: 33
End: 2018-01-25
Payer: COMMERCIAL

## 2018-01-25 VITALS
DIASTOLIC BLOOD PRESSURE: 76 MMHG | HEIGHT: 63 IN | SYSTOLIC BLOOD PRESSURE: 100 MMHG | BODY MASS INDEX: 31.86 KG/M2 | TEMPERATURE: 98.8 F | HEART RATE: 94 BPM | OXYGEN SATURATION: 99 % | WEIGHT: 179.8 LBS

## 2018-01-25 DIAGNOSIS — K52.9 GASTROENTERITIS: Primary | ICD-10-CM

## 2018-01-25 DIAGNOSIS — K21.9 GASTROESOPHAGEAL REFLUX DISEASE, ESOPHAGITIS PRESENCE NOT SPECIFIED: ICD-10-CM

## 2018-01-25 PROBLEM — E03.9 HYPOTHYROIDISM: Status: ACTIVE | Noted: 2018-01-25

## 2018-01-25 PROCEDURE — 99213 OFFICE O/P EST LOW 20 MIN: CPT | Performed by: INTERNAL MEDICINE

## 2018-01-25 RX ORDER — LEVOTHYROXINE SODIUM 0.07 MG/1
75 TABLET ORAL DAILY
COMMUNITY
End: 2018-05-02 | Stop reason: SDUPTHER

## 2018-01-25 RX ORDER — OMEPRAZOLE 20 MG/1
20 CAPSULE, DELAYED RELEASE ORAL DAILY
Qty: 30 CAPSULE | Refills: 0 | Status: ON HOLD | OUTPATIENT
Start: 2018-01-25 | End: 2018-03-15 | Stop reason: ALTCHOICE

## 2018-01-25 NOTE — PATIENT INSTRUCTIONS
Drink plenty of fluids  Get plenty of rest   Wash your hands liberally      Gastroenteritis   AMBULATORY CARE:   Gastroenteritis , or stomach flu, is an infection of the stomach and intestines  It is caused by bacteria, parasites, or viruses  Common symptoms include the following:   · Diarrhea or gas    · Nausea, vomiting, or poor appetite    · Abdominal cramps, pain, or gurgling    · Fever    · Tiredness or weakness    · Headaches or muscle aches with any of the above symptoms  Call 911 for any of the following:   · You have trouble breathing or a very fast pulse  Seek care immediately if:   · You see blood in your diarrhea  · You cannot stop vomiting  · You have not urinated for 12 hours  · You feel like you are going to faint  Contact your healthcare provider if:   · You have a fever  · You continue to vomit or have diarrhea, even after treatment  · You see worms in your diarrhea  · Your mouth or eyes are dry  You are not urinating as much or as often  · You have questions or concerns about your condition or care  Treatment for gastroenteritis  may include medicines to slow or stop your diarrhea or vomiting  You may also need medicines to treat an infection caused by bacteria or a parasite  Manage your symptoms:   · Drink liquids as directed  Ask your healthcare provider how much liquid to drink each day, and which liquids are best for you  You may also need to drink an oral rehydration solution (ORS)  An ORS has the right amounts of sugar, salt, and minerals in water to replace body fluids  · Eat bland foods  When you feel hungry, begin eating soft, bland foods  Examples are bananas, clear soup, potatoes, and applesauce  Do not have dairy products, alcohol, sugary drinks, or drinks with caffeine until you feel better  · Rest as much as possible  Slowly start to do more each day when you begin to feel better    Prevent the spread of germs:  Gastroenteritis can spread easily  Keep yourself, your family, and your surroundings clean to help prevent the spread of gastroenteritis:  · Wash your hands often  Use soap and water  Wash your hands after you use the bathroom, change a child's diapers, or sneeze  Wash your hands before you prepare or eat food  · Clean surfaces and do laundry often  Wash your clothes and towels separately from the rest of the laundry  Clean surfaces in your home with antibacterial  or bleach  · Clean food thoroughly and cook safely  Wash raw vegetables before you cook  Cook meat, fish, and eggs fully  Do not use the same dishes for raw meat as you do for other foods  Refrigerate any leftover food immediately  · Be aware when you camp or travel  Drink only clean water  Do not drink from rivers or lakes unless you purify or boil the water first  When you travel, drink bottled water and do not add ice  Do not eat fruit that has not been peeled  Do not eat raw fish or meat that is not fully cooked  Follow up with your healthcare provider as directed:  Write down your questions so you remember to ask them during your visits  © 2017 [facility] Information is for End User's use only and may not be sold, redistributed or otherwise used for commercial purposes  All illustrations and images included in CareNotes® are the copyrighted property of [employer]  or [employer]ven[employer] Ar[employer]  The above information is an  only  It is not intended as medical advice for individual conditions or treatments  Talk to your doctor, nurse or pharmacist before following any medical regimen to see if it is safe and effective for you

## 2018-01-25 NOTE — LETTER
January 25, 2018     Patient: Deon León   YOB: 1985   Date of Visit: 1/25/2018       To Whom it May Concern:    Giovanny Francesca is under my professional care  She was seen in my office on 1/25/2018  She may return to work on 01/27/18  If you have any questions or concerns, please don't hesitate to call           Sincerely,          Aby Jimenez,         CC: No Recipients

## 2018-01-25 NOTE — PROGRESS NOTES
Assessment/Plan:         Diagnoses and all orders for this visit:    Gastroenteritis    Gastroesophageal reflux disease, esophagitis presence not specified  -     omeprazole (PriLOSEC) 20 mg delayed release capsule; Take 1 capsule by mouth daily    Other orders  -     levothyroxine 75 mcg tablet; Take 75 mcg by mouth daily        Gastroenteritis, likely viral  - We will manage pt symptomatically with OTC tylenol for abdominal  Pain and myalgia  We have counseled pt to drink a lot of fluids and to wash her hands liberally  We also counseled her to throw away her week-old salad  GERD  - We will start pt on omeprazole  - We will inform pt to hold off on starting omeprazole till diarrhea is resolved  She may take OTC zantac for now  Subjective:      Patient ID: Ivory Reinoso is a 27 y o  female  HPI   Patient states she has been having nausea, vomiting x1, diarrhea times 7-8, abdominal pain, chills, myalgia since yesterday  The she admits to eating a week old salad that she prepared by herself  Her  also ate the same food but has no complaints  Her bowel movement how watery but do not contain mucus or blood  She vomited only 1 time yesterday but still feels nauseous     There is associated fatigue and dizziness and she has not been able to eat anything since yesterday  Abdominal pain is located in the lower quadrant bilaterally  The following portions of the patient's history were reviewed and updated as appropriate: allergies, current medications, past family history, past medical history, past social history, past surgical history and problem list     Review of Systems   Constitutional: Positive for chills  Negative for activity change, fatigue, fever and unexpected weight change  HENT: Negative for ear pain, postnasal drip, rhinorrhea, sinus pressure and sore throat  Eyes: Negative for pain     Respiratory: Negative for cough, choking, chest tightness, shortness of breath and wheezing  Cardiovascular: Negative for chest pain, palpitations and leg swelling  Gastrointestinal: Positive for abdominal pain, diarrhea, nausea and vomiting  Negative for blood in stool and constipation  Genitourinary: Negative for dysuria, hematuria and vaginal discharge  Musculoskeletal: Negative for arthralgias, back pain, gait problem, joint swelling, myalgias and neck stiffness  Skin: Negative for pallor and rash  Neurological: Positive for dizziness, weakness and light-headedness  Negative for tremors, seizures, syncope and headaches  Hematological: Negative for adenopathy  Psychiatric/Behavioral: Negative for behavioral problems  Objective:  /76 (BP Location: Left arm, Patient Position: Sitting, Cuff Size: Standard)   Pulse 94   Temp 98 8 °F (37 1 °C) (Tympanic)   Ht 5' 3 25" (1 607 m)   Wt 81 6 kg (179 lb 12 8 oz)   SpO2 99%   BMI 31 60 kg/m²        Physical Exam      GENERAL:  In no obvious distress, sitting comfortably on exam table, afebrile to touch  HEENT:  Normocephalic, atraumatic  Pupils equal round and reactive to light and accommodation  Extraocular muscles intact bilaterally  Not pale, anicteric  No oropharyngeal erythema  NECK:  Supple, + B/L mild cervical lymphadenopathy  CVS:  S1, S2   Regular rate and rhythm  No murmurs, rubs or gallops  RESPIRATORY:  Clear to auscultation bilaterally  No wheezes, rhonchi or rales  ABDOMEN:  Full, soft,  +tenderness in epigastric and bilateral lower abdominal quadrant, nondistended  No masses or organomegaly  Normoactive bowel sounds  MSK:  No pedal edema  NEURO:  Awake, alert and oriented x3

## 2018-02-28 ENCOUNTER — CLINICAL SUPPORT (OUTPATIENT)
Dept: INTERNAL MEDICINE CLINIC | Facility: CLINIC | Age: 33
End: 2018-02-28
Payer: COMMERCIAL

## 2018-02-28 DIAGNOSIS — E03.9 HYPOTHYROIDISM, UNSPECIFIED TYPE: Primary | ICD-10-CM

## 2018-02-28 LAB — TSH SERPL DL<=0.05 MIU/L-ACNC: 1.23 UIU/ML (ref 0.36–3.74)

## 2018-02-28 PROCEDURE — 36415 COLL VENOUS BLD VENIPUNCTURE: CPT

## 2018-02-28 PROCEDURE — 84443 ASSAY THYROID STIM HORMONE: CPT | Performed by: NURSE PRACTITIONER

## 2018-03-08 NOTE — PRE-PROCEDURE INSTRUCTIONS
Pre-Surgery Instructions:   Medication Instructions    levothyroxine 75 mcg tablet Instructed patient per Anesthesia Guidelines   omeprazole (PriLOSEC) 20 mg delayed release capsule Instructed patient per Anesthesia Guidelines  Before your operation, you play an important role in decreasing your risk for infection by washing with special antiseptic soap  This is an effective way to reduce bacteria on the skin which may help to prevent infections at the surgical site  Please read the following directions in advance  1  In the week before your operation purchase a 4 ounce bottle of antiseptic soap containing chlorhexidine gluconate 4%  Some brand names include: Aplicare, Endure, and Hibiclens  The cost is usually less than $5 00  · For your convenience, the [address] [place] [address] carries the soap  · It may also be available at your doctor's office or pre-admission testing center, and at most retail pharmacies  · If you are allergic or sensitive to soaps containing chlorhexidine gluconate (CHG), please let your doctor know so another antiseptic soap can be suggested  · CHG antiseptic soap is for external use only  2      The day before your operation follow these directions carefully to get ready  · Place clean lines (sheets) on your bed; you should sleep on clean sheets after your evening shower  · Get clean towels and washcloths ready - you need enough for 2 showers  · Set aside clean underwear, pajamas, and clothing to wear after the shower  Reminders:  · DO NOT use any other soap or body rinse on your skin during or after the antiseptic showers  · DO NOT use lotion , powder, deodorant, or perfume/aftershave of any kind on your skin after your antiseptic shower  · DO NOT shave any body parts in the 24 hours/the day before your operation  · DO NOT get the antiseptic soap in your eyes, ears, nose, mouth, or vaginal area      3      You will need to shower the night before AND the morning of your Surgery  Shower 1:  · The evening before your operation, take the fist shower  · First, shampoo your hair with regular shampoo and rinse it completely before you use the anitseptic soap  After washing and rinsing your hair, rinse your body  · Next, use a clean wash cloth to apply the antiseptic soap and wash your body from the neck down to your toes using 1/2 bottle of the antiseptic soap  You will use the other 1/2 bottle for the second shower  · Clean the area where your incision will be; later this area well for about 2 minutes  · If you ar having head or neck surgery, wash areas with the antiseptic soap  · Rinse yourself completely with running water  · Use a clean towel to dry off  · Wear clean underwear and clothing/pajamas  Shower 2:  · The Morning of your operation, take the second shower following the same steps as Shower 1 using the second 1/2 of the bottle of antiseptic soap  · Use clean cloths and towels to was and dry yourself off  · Wear clean underwear and clothing

## 2018-03-15 ENCOUNTER — HOSPITAL ENCOUNTER (OUTPATIENT)
Facility: HOSPITAL | Age: 33
Setting detail: OUTPATIENT SURGERY
Discharge: HOME | End: 2018-03-15
Attending: ORTHOPAEDIC SURGERY | Admitting: ORTHOPAEDIC SURGERY
Payer: COMMERCIAL

## 2018-03-15 ENCOUNTER — ANESTHESIA (OUTPATIENT)
Dept: PERIOP | Facility: HOSPITAL | Age: 33
End: 2018-03-15
Payer: COMMERCIAL

## 2018-03-15 ENCOUNTER — ANESTHESIA EVENT (OUTPATIENT)
Dept: PERIOP | Facility: HOSPITAL | Age: 33
End: 2018-03-15
Payer: COMMERCIAL

## 2018-03-15 VITALS
RESPIRATION RATE: 16 BRPM | WEIGHT: 180 LBS | TEMPERATURE: 98.2 F | HEART RATE: 90 BPM | SYSTOLIC BLOOD PRESSURE: 133 MMHG | DIASTOLIC BLOOD PRESSURE: 71 MMHG | BODY MASS INDEX: 31.89 KG/M2 | OXYGEN SATURATION: 99 % | HEIGHT: 63 IN

## 2018-03-15 DIAGNOSIS — M67.432 GANGLION CYST OF VOLAR ASPECT OF LEFT WRIST: Primary | ICD-10-CM

## 2018-03-15 LAB — EXT PREGNANCY TEST URINE: NEGATIVE

## 2018-03-15 PROCEDURE — 25111 REMOVE WRIST TENDON LESION: CPT | Performed by: ORTHOPAEDIC SURGERY

## 2018-03-15 PROCEDURE — 81025 URINE PREGNANCY TEST: CPT | Performed by: ORTHOPAEDIC SURGERY

## 2018-03-15 RX ORDER — FENTANYL CITRATE/PF 50 MCG/ML
25 SYRINGE (ML) INJECTION
Status: DISCONTINUED | OUTPATIENT
Start: 2018-03-15 | End: 2018-03-15 | Stop reason: HOSPADM

## 2018-03-15 RX ORDER — ONDANSETRON 2 MG/ML
INJECTION INTRAMUSCULAR; INTRAVENOUS AS NEEDED
Status: DISCONTINUED | OUTPATIENT
Start: 2018-03-15 | End: 2018-03-15 | Stop reason: SURG

## 2018-03-15 RX ORDER — MAGNESIUM HYDROXIDE 1200 MG/15ML
LIQUID ORAL AS NEEDED
Status: DISCONTINUED | OUTPATIENT
Start: 2018-03-15 | End: 2018-03-15 | Stop reason: HOSPADM

## 2018-03-15 RX ORDER — METOCLOPRAMIDE HYDROCHLORIDE 5 MG/ML
10 INJECTION INTRAMUSCULAR; INTRAVENOUS ONCE AS NEEDED
Status: DISCONTINUED | OUTPATIENT
Start: 2018-03-15 | End: 2018-03-15 | Stop reason: HOSPADM

## 2018-03-15 RX ORDER — HYDROCODONE BITARTRATE AND ACETAMINOPHEN 5; 325 MG/1; MG/1
1 TABLET ORAL EVERY 6 HOURS PRN
Status: DISCONTINUED | OUTPATIENT
Start: 2018-03-15 | End: 2018-03-15 | Stop reason: HOSPADM

## 2018-03-15 RX ORDER — FENTANYL CITRATE 50 UG/ML
INJECTION, SOLUTION INTRAMUSCULAR; INTRAVENOUS AS NEEDED
Status: DISCONTINUED | OUTPATIENT
Start: 2018-03-15 | End: 2018-03-15 | Stop reason: SURG

## 2018-03-15 RX ORDER — LIDOCAINE HYDROCHLORIDE AND EPINEPHRINE 10; 10 MG/ML; UG/ML
INJECTION, SOLUTION INFILTRATION; PERINEURAL AS NEEDED
Status: DISCONTINUED | OUTPATIENT
Start: 2018-03-15 | End: 2018-03-15 | Stop reason: HOSPADM

## 2018-03-15 RX ORDER — DEXAMETHASONE SODIUM PHOSPHATE 4 MG/ML
INJECTION, SOLUTION INTRA-ARTICULAR; INTRALESIONAL; INTRAMUSCULAR; INTRAVENOUS; SOFT TISSUE AS NEEDED
Status: DISCONTINUED | OUTPATIENT
Start: 2018-03-15 | End: 2018-03-15 | Stop reason: SURG

## 2018-03-15 RX ORDER — MIDAZOLAM HYDROCHLORIDE 1 MG/ML
INJECTION INTRAMUSCULAR; INTRAVENOUS AS NEEDED
Status: DISCONTINUED | OUTPATIENT
Start: 2018-03-15 | End: 2018-03-15 | Stop reason: SURG

## 2018-03-15 RX ORDER — HYDROCODONE BITARTRATE AND ACETAMINOPHEN 5; 325 MG/1; MG/1
1 TABLET ORAL EVERY 6 HOURS PRN
Qty: 10 TABLET | Refills: 0 | Status: SHIPPED | OUTPATIENT
Start: 2018-03-15 | End: 2018-03-23

## 2018-03-15 RX ORDER — SODIUM CHLORIDE, SODIUM LACTATE, POTASSIUM CHLORIDE, CALCIUM CHLORIDE 600; 310; 30; 20 MG/100ML; MG/100ML; MG/100ML; MG/100ML
75 INJECTION, SOLUTION INTRAVENOUS CONTINUOUS
Status: DISCONTINUED | OUTPATIENT
Start: 2018-03-15 | End: 2018-03-15 | Stop reason: HOSPADM

## 2018-03-15 RX ADMIN — MIDAZOLAM HYDROCHLORIDE 2 MG: 1 INJECTION, SOLUTION INTRAMUSCULAR; INTRAVENOUS at 08:32

## 2018-03-15 RX ADMIN — ONDANSETRON 4 MG: 2 INJECTION INTRAMUSCULAR; INTRAVENOUS at 08:37

## 2018-03-15 RX ADMIN — FENTANYL CITRATE 25 MCG: 50 INJECTION, SOLUTION INTRAMUSCULAR; INTRAVENOUS at 08:40

## 2018-03-15 RX ADMIN — DEXAMETHASONE SODIUM PHOSPHATE 4 MG: 4 INJECTION, SOLUTION INTRAMUSCULAR; INTRAVENOUS at 08:40

## 2018-03-15 RX ADMIN — FENTANYL CITRATE 25 MCG: 50 INJECTION, SOLUTION INTRAMUSCULAR; INTRAVENOUS at 08:55

## 2018-03-15 RX ADMIN — HYDROCODONE BITARTRATE AND ACETAMINOPHEN 1 TABLET: 5; 325 TABLET ORAL at 10:04

## 2018-03-15 RX ADMIN — CEFAZOLIN SODIUM 1000 MG: 1 SOLUTION INTRAVENOUS at 08:28

## 2018-03-15 RX ADMIN — SODIUM CHLORIDE, SODIUM LACTATE, POTASSIUM CHLORIDE, AND CALCIUM CHLORIDE 75 ML/HR: .6; .31; .03; .02 INJECTION, SOLUTION INTRAVENOUS at 08:27

## 2018-03-15 RX ADMIN — SODIUM CHLORIDE, SODIUM LACTATE, POTASSIUM CHLORIDE, AND CALCIUM CHLORIDE: .6; .31; .03; .02 INJECTION, SOLUTION INTRAVENOUS at 08:28

## 2018-03-15 RX ADMIN — FENTANYL CITRATE 50 MCG: 50 INJECTION, SOLUTION INTRAMUSCULAR; INTRAVENOUS at 08:32

## 2018-03-15 NOTE — DISCHARGE INSTRUCTIONS

## 2018-03-15 NOTE — ADDENDUM NOTE
Addendum  created 03/15/18 1110 by Solomon Mcknight MD    Anesthesia Intra LDAs edited, LDA properties accepted

## 2018-03-15 NOTE — OP NOTE
OPERATIVE REPORT  PATIENT NAME: Helga Meléndez  :  1985  MRN: [id number]  Pt Location: [facility]    SURGERY DATE: 03/15/18    Surgeon(s) and Role:     * Rangel Dutton MD - Primary     * Devon Multani MD - Assisting    Pre-Op Diagnosis:  Ganglion of left wrist [M67 432]    Post-Op Diagnosis Codes:     * Ganglion of left wrist [M67 432]    Procedure(s):  VOLAR WRIST GANGLION CYST EXCISION (Left)    Specimen(s):  * No orders in the log *    Estimated Blood Loss:   Minimal      Anesthesia Type:   General    Operative Indications: The patient has a history of Volar ganglion cyst  left that was recalcitrant to conservative management  The decision was made to bring the patient to the operating room for Volar ganglion cyst excision  left  Risks of the procedure were explained which include, but are not limited to bleeding; infection; damage to nerves, arteries,veins, tendons; scar; pain; need for reoperation; failure to give desired result; and risks of anaesthesia  All questions were answered to satisfaction and they were willing to proceed  Operative Findings:  Left wrist volar ganglion cyst    Complications:   None    Procedure and Technique:  After the patient, site, and procedure were identified, the patient was brought into the operating room in a supine position  General anaesthesia and local medication were provided  A well padded tourniquet was applied to the extremity, set at 250 mmHg  The  left upper extremity was then prepped and drapped in a normal, sterile, orthopedic fashion  After the patient, site, and procedure were once again identified, attention was turned to the left wrist   An incision centered over the ganglion cyst was made  Care was taken to dissect down while maintaining hemostasis and protecting the superficial and deep neurovascular structures  The surrounding tendon structures were also protected    The ganglion cyst was identified and circumferentially dissected free from all surrounding structures  The stalk was identified arising from the scaphoid trapezial trapezoidal joint and excised with a small cuff of tissue at the base to try to prevent recurrence  At the completion of the procedure, hemostasis was obtained with cautery and direct pressure  The wounds were copiously irrigated with sterile solution  The wounds were closed with Vicryl, Monocryl, Histocryl and Steri-strips  Sterile dressings were applied, including Gauze, Tweeners, Webril, Ace  Please note, all sponge, needle, and instrument counts were correct prior to closure  Loupe magnification was utilized  The patient tolerated the procedure well       I was present for the entire procedure    Patient Disposition:  PACU  and hemodynamically stable    SIGNATURE: Nadja Reese MD  DATE: 03/15/18  TIME: 9:11 AM

## 2018-03-15 NOTE — ANESTHESIA PREPROCEDURE EVALUATION
Review of Systems/Medical History  Patient summary reviewed  Chart reviewed  History of anesthetic complications (PONV after C/sedction) PONV    Cardiovascular   Pulmonary       GI/Hepatic    GERD well controlled,             Endo/Other  History of thyroid disease , hypothyroidism,      GYN       Hematology   Musculoskeletal       Neurology  Seizures Elly Leo, last episode 7 years ago, on no meds, neuro w/u always negative) ,     Psychology           Physical Exam    Airway    Mallampati score: I  TM Distance: >3 FB  Neck ROM: full     Dental       Cardiovascular  Cardiovascular exam normal    Pulmonary  Pulmonary exam normal     Other Findings        Anesthesia Plan  ASA Score- 2     Anesthesia Type- general with ASA Monitors  Additional Monitors:   Airway Plan: LMA  Plan Factors-    Induction- intravenous  Postoperative Plan-     Informed Consent- Anesthetic plan and risks discussed with patient

## 2018-03-15 NOTE — H&P
H&P Exam - Orthopedics   Deon León 26 y o  female MRN: [id number]  Unit/Bed#: OR POOL    Assessment/Plan   Assessment:  Left recurrent volar ganglion cyst  Plan:  Patient for left volar ganglion cyst excision today    History of Present Illness   HPI:  Deon León is a 26 y o  y o  female who presents with recurrent left volar wrist ganglion cyst with discomfort  Failed conservative management  Historical Information  Review Of Systems:   · Skin: Normal  · Neuro: See HPI  · Musculoskeletal: See HPI  · 14 point review of systems negative except as stated above     Past Medical History:   Past Medical History:   Diagnosis Date    Hypothyroidism 2018    PONV (postoperative nausea and vomiting)     Seizures (HCC)        Past Surgical History:   Past Surgical History:   Procedure Laterality Date     SECTION      MOUTH SURGERY Bilateral 2000    WISDOM TOOTH EXTRACTION         Family History:  Family history reviewed and non-contributory  Family History   Problem Relation Age of Onset    Cancer Maternal Grandmother     Cancer Maternal Grandfather     Diabetes Paternal Grandmother     No Known Problems Paternal Grandfather     No Known Problems Mother     No Known Problems Father        Social History:  Social History     Social History    Marital status: /Civil Union     Spouse name: N/A    Number of children: N/A    Years of education: N/A     Social History Main Topics    Smoking status: Never Smoker    Smokeless tobacco: Never Used    Alcohol use 0 6 oz/week     1 Cans of beer per week      Comment: one per month    Drug use: No    Sexual activity: Yes     Other Topics Concern    None     Social History Narrative    None       Allergies:    Allergies   Allergen Reactions    Seasonal Ic [Cholestatin] Itching     Itchy eyes, congestion           Labs:    0  Lab Value Date/Time   HCT 40 8 10/04/2017 0832   HCT 38 2 2014   HGB 13 3 10/04/2017 0832 HGB 12 5 11/07/2014 2000   WBC 6 71 10/04/2017 0832   WBC 6 51 11/07/2014 2000       Meds:  No current facility-administered medications for this encounter  Blood Culture:   No results found for: BLOODCX    Wound Culture:   No results found for: WOUNDCULT    Ins and Outs:  No intake/output data recorded  Physical Exam  /71   Pulse 80   Temp 99 °F (37 2 °C) (Oral)   Resp 18   Ht 5' 3" (1 6 m)   Wt 81 6 kg (180 lb)   LMP 02/09/2018   SpO2 100%   BMI 31 89 kg/m²   Gen: Alert and oriented to person, place, time  HEENT: EOMI, eyes clear, moist mucus membranes, hearing intact  Respiratory: Bilateral chest rise  No audible wheezing found  Cardiovascular: Regular Rate and Rhythm  Abdomen: soft nontender/nondistended  Ortho Exam:  Volar ganglion cyst on the left wrist with full range of motion and no instability of the scapholunate ligament complex  Neuro Exam:  The patient is neurovascularly intact in the median, ulnar, and radial nerve distribution  There is normal sensation and good capillary refill within the digits  2+ pulses      Lab Results: Reviewed  Imaging: Reviewed

## 2018-03-20 ENCOUNTER — TELEPHONE (OUTPATIENT)
Dept: OBGYN CLINIC | Facility: HOSPITAL | Age: 33
End: 2018-03-20

## 2018-03-20 NOTE — TELEPHONE ENCOUNTER
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

## 2018-03-20 NOTE — TELEPHONE ENCOUNTER
Pt's cyst was on the thumb side of her wrist, so depending on where it was exactly, they may have had to protect nerves during the surgery which can cause temporary n/t  (I can't speak personally as I was not in her surgery)  Some n/t can definitely happen and we will continue to monitor this through her recovery  Warning signs are if the finger becomes very swollen and/or discolored

## 2018-03-23 ENCOUNTER — OFFICE VISIT (OUTPATIENT)
Dept: OBGYN CLINIC | Facility: HOSPITAL | Age: 33
End: 2018-03-23

## 2018-03-23 VITALS
HEART RATE: 63 BPM | DIASTOLIC BLOOD PRESSURE: 82 MMHG | WEIGHT: 185 LBS | BODY MASS INDEX: 32.78 KG/M2 | SYSTOLIC BLOOD PRESSURE: 126 MMHG | HEIGHT: 63 IN

## 2018-03-23 DIAGNOSIS — M67.432 GANGLION CYST OF VOLAR ASPECT OF LEFT WRIST: Primary | ICD-10-CM

## 2018-03-23 PROCEDURE — 99024 POSTOP FOLLOW-UP VISIT: CPT | Performed by: ORTHOPAEDIC SURGERY

## 2018-03-23 NOTE — PROGRESS NOTES
Diagnoses and all orders for this visit:    Ganglion cyst of volar aspect of left wrist        Assessment:   Volar ganglion cyst  left    Plan:   Resume activities as tolerated and scar tissue massage  Explained n/t from retraction and should resolve with time  Follow Up:  PRN        CHIEF COMPLAINT:  Chief Complaint   Patient presents with    Left Wrist - Post-op         SUBJECTIVE:  Juany Fierro is a 26y o  year old female who presents for follow up after Volar ganglion cyst excision  left  Today patient describes numbness in the palm along the area of the thenar eminence and radiating down the thumb  Also with some slight discomfort still  PHYSICAL EXAMINATION:  General: well developed and well nourished, alert, oriented times 3 and appears comfortable  Psychiatric: Normal    MUSCULOSKELETAL EXAMINATION:  Incision: Clean, dry, intact  Range of Motion: As expected  Neurovascular status: radial pulse 2+ and tinel's along the area of the incision to the thenar eminence area only   Motor intact to AIN, median and ulnar nerves  Activity Restrictions: No restrictions      STUDIES REVIEWED:  No Studies to review      PROCEDURES PERFORMED:  Procedures  No Procedures performed today

## 2018-05-02 DIAGNOSIS — E03.9 HYPOTHYROIDISM, UNSPECIFIED TYPE: Primary | ICD-10-CM

## 2018-05-02 RX ORDER — LEVOTHYROXINE SODIUM 0.07 MG/1
75 TABLET ORAL DAILY
Qty: 90 TABLET | Refills: 1 | Status: SHIPPED | OUTPATIENT
Start: 2018-05-02 | End: 2020-09-28

## 2018-05-04 ENCOUNTER — OFFICE VISIT (OUTPATIENT)
Dept: INTERNAL MEDICINE CLINIC | Facility: CLINIC | Age: 33
End: 2018-05-04
Payer: COMMERCIAL

## 2018-05-04 VITALS
BODY MASS INDEX: 31.76 KG/M2 | HEIGHT: 64 IN | TEMPERATURE: 97.6 F | HEART RATE: 98 BPM | WEIGHT: 186 LBS | SYSTOLIC BLOOD PRESSURE: 122 MMHG | DIASTOLIC BLOOD PRESSURE: 74 MMHG | OXYGEN SATURATION: 99 %

## 2018-05-04 DIAGNOSIS — Z32.01 ENCOUNTER FOR PREGNANCY TEST, RESULT POSITIVE: ICD-10-CM

## 2018-05-04 DIAGNOSIS — R11.0 NAUSEA: ICD-10-CM

## 2018-05-04 DIAGNOSIS — Z3A.01 LESS THAN 8 WEEKS GESTATION OF PREGNANCY: Primary | ICD-10-CM

## 2018-05-04 LAB
ABO GROUP BLD: NORMAL
B-HCG SERPL-ACNC: ABNORMAL MIU/ML
RH BLD: POSITIVE

## 2018-05-04 PROCEDURE — 84702 CHORIONIC GONADOTROPIN TEST: CPT | Performed by: NURSE PRACTITIONER

## 2018-05-04 PROCEDURE — 86901 BLOOD TYPING SEROLOGIC RH(D): CPT | Performed by: NURSE PRACTITIONER

## 2018-05-04 PROCEDURE — 36415 COLL VENOUS BLD VENIPUNCTURE: CPT | Performed by: NURSE PRACTITIONER

## 2018-05-04 PROCEDURE — 99213 OFFICE O/P EST LOW 20 MIN: CPT | Performed by: NURSE PRACTITIONER

## 2018-05-04 PROCEDURE — 86900 BLOOD TYPING SEROLOGIC ABO: CPT | Performed by: NURSE PRACTITIONER

## 2018-05-04 NOTE — ASSESSMENT & PLAN NOTE
Patient advised to call her GYN to discuss her symptoms  Encouraged her to stay hydrated and continue with a soft diet  Patient's diarrhea headache nausea and fatigue all seem to be related to her pregnancy  Patient going to  prenatal vitamin today as well as bands for her wrist to help with her nausea

## 2018-05-04 NOTE — PATIENT INSTRUCTIONS
Nausea and Vomiting in Pregnancy   WHAT YOU NEED TO KNOW:   Nausea and vomiting can happen any time of day  These symptoms usually start before the 9th week of pregnancy, and end by the 14th week (second trimester)  Some women can have nausea and vomiting for a longer time  These symptoms can affect some women throughout the entire pregnancy  Nausea and vomiting do not harm your baby  These symptoms can make it hard for you to do your daily activities  DISCHARGE INSTRUCTIONS:   Return to the emergency department if:   · You have signs of dehydration  Examples are dark yellow urine, dry mouth and lips, dry skin, fast heartbeat, and urinating less than usual     · You have severe abdominal pain  · You feel too weak or dizzy to stand up  · You see blood in your vomit or bowel movements  Contact your healthcare provider if:   · You vomit more than 4 times in 1 day  · You have not been able to keep liquids down for more than 1 day  · You lose more than 2 pounds  · You have a fever  · Your nausea and vomiting continue longer than 14 weeks  · You have questions or concerns about your condition or care  Nutrition changes you can make to manage nausea and vomiting:   · Eat small meals throughout the day instead of 3 large meals  You may be more likely to have nausea and vomiting when your stomach is empty  Eat foods that are low in fat and high in protein  Examples are lean meat, beans, turkey, and chicken without the skin  Eat a small snack, such as crackers, dry cereal, or a small sandwich before you go to bed  · Eat some crackers or dry toast before you get out of bed in the morning  Get out of bed slowly  Sudden movements could cause you to get dizzy and nauseated  · Eat bland foods when you feel nauseated  Examples of bland foods include dry toast, dry cereal, plain pasta, white rice, and bread  Other bland foods include saltine crackers, bananas, gelatin, and pretzels   Avoid spicy, greasy, and fried foods  Avoid any other foods that make you feel nauseated  · Drink liquids that contain ginger  Drink ginger ale made with real ginger or ginger tea made with fresh grated ginger  Ginger capsules or ginger candies may also help to decrease nausea and vomiting  · Drink liquids between meals instead of with meals  Wait at least 30 minutes after you eat to drink liquids  Drink small amounts of liquids often throughout the day to prevent dehydration  Ask how much liquid you should drink each day  Other changes you can make to manage nausea and vomiting:   · Avoid smells that bother you  Strong odors may cause nausea and vomiting to start, or make it worse  Take a short walk, turn on a fan, or try to sleep with the window open to get fresh air  When you are cooking, open windows to get rid of smells that may cause nausea  · Do not brush your teeth right after you eat  if it makes you nauseated  · Rest when you need to  Start activity slowly and work up to your usual routine as you start to feel better  · Talk to your healthcare provider about your prenatal vitamins  Prenatal vitamins can cause nausea for some women  Try taking your prenatal vitamin at night or with a snack  If this change does not help, your healthcare provider may recommend a different type of vitamin  · Do not use any medicines, vitamins, or supplements to manage your symptoms without asking your healthcare provider  Many medicines can harm an unborn baby  · Light to moderate exercise  may help to decrease your symptoms  It may also help you to sleep better at night  Ask your healthcare provider about the best exercise plan for you  Follow up with your healthcare provider as directed:  Write down your questions so you remember to ask them during your visits     © 2017 [employer] Information is for End User's use only and may not be sold, redistributed or otherwise used for commercial purposes  All illustrations and images included in CareNotes® are the copyrighted property of [employer]  or [employer]  The above information is an  only  It is not intended as medical advice for individual conditions or treatments  Talk to your doctor, nurse or pharmacist before following any medical regimen to see if it is safe and effective for you

## 2018-05-04 NOTE — PROGRESS NOTES
Assessment/Plan:    Less than 8 weeks gestation of pregnancy   Patient advised to call her GYN to discuss her symptoms  Encouraged her to stay hydrated and continue with a soft diet  Patient's diarrhea headache nausea and fatigue all seem to be related to her pregnancy  Patient going to  prenatal vitamin today as well as bands for her wrist to help with her nausea  Nausea   Patient advised to call her GYN to discuss her symptoms  Encouraged her to stay hydrated and continue with a soft diet  Patient's diarrhea headache nausea and fatigue all seem to be related to her pregnancy  Patient going to  prenatal vitamin today as well as bands for her wrist to help with her nausea  Diagnoses and all orders for this visit:    Less than 8 weeks gestation of pregnancy    Nausea          Subjective:      Patient ID: Verna Forrest is a 26 y o  female  Pt  Presents today with nausea, diarrhea, fatigue and headaches  Pt  States that she is pregnant, she took a pregnancy test on Sunday  She states she is going to get a prenatal vitimin today  She did not eat anything that was bad  Her  and son had diarrhea over the past two weeks  Diarrhea    This is a new problem  Episode onset: 3 days ago  The problem occurs 5 to 10 times per day  The problem has been unchanged  The stool consistency is described as watery (denies blood or mucous)  The patient states that diarrhea awakens her from sleep  Associated symptoms include bloating, headaches and increased flatus  Pertinent negatives include no abdominal pain, arthralgias, chills, coughing, fever, myalgias, sweats or vomiting  Nothing aggravates the symptoms  Risk factors include ill contacts  She has tried increased fluids for the symptoms  The treatment provided no relief  Headache    This is a new problem  Episode onset: last 3 days  The problem occurs constantly  The problem has been unchanged   The pain is located in the parietal region  The pain quality is similar to prior headaches  The quality of the pain is described as dull  The patient is experiencing no pain  Associated symptoms include nausea and sinus pressure  Pertinent negatives include no abdominal pain, back pain, blurred vision, coughing, dizziness, ear pain, eye pain, fever, loss of balance, neck pain, numbness, phonophobia, photophobia, rhinorrhea, scalp tenderness, sore throat, vomiting or weakness  Nausea   This is a new problem  Episode onset: 3 days  The problem has been unchanged  Associated symptoms include fatigue, headaches and nausea  Pertinent negatives include no abdominal pain, arthralgias, chest pain, chills, congestion, coughing, diaphoresis, fever, myalgias, neck pain, numbness, rash, sore throat, vomiting or weakness  Nothing aggravates the symptoms  Fatigue   This is a new problem  Episode onset: 3 days ago  Associated symptoms include fatigue, headaches and nausea  Pertinent negatives include no abdominal pain, arthralgias, chest pain, chills, congestion, coughing, diaphoresis, fever, myalgias, neck pain, numbness, rash, sore throat, vomiting or weakness  The treatment provided no relief  The following portions of the patient's history were reviewed and updated as appropriate: allergies, current medications, past family history, past medical history, past social history, past surgical history and problem list     Review of Systems   Constitutional: Positive for fatigue  Negative for activity change, appetite change, chills, diaphoresis and fever  HENT: Positive for sinus pressure  Negative for congestion, ear discharge, ear pain, postnasal drip, rhinorrhea, sinus pain and sore throat  Eyes: Negative for blurred vision, photophobia, pain, discharge, itching and visual disturbance  Respiratory: Negative for cough, chest tightness, shortness of breath and wheezing      Cardiovascular: Negative for chest pain, palpitations and leg swelling  Gastrointestinal: Positive for bloating, diarrhea, flatus and nausea  Negative for abdominal pain, blood in stool, constipation and vomiting  Endocrine: Negative for polydipsia, polyphagia and polyuria  Genitourinary: Negative for difficulty urinating, dysuria, hematuria and urgency  Musculoskeletal: Negative for arthralgias, back pain, myalgias and neck pain  Skin: Negative for rash and wound  Neurological: Positive for headaches  Negative for dizziness, weakness, numbness and loss of balance           Past Medical History:   Diagnosis Date    Hypothyroidism 2018    PONV (postoperative nausea and vomiting)     Seizures (HCC)          Current Outpatient Prescriptions:     levothyroxine 75 mcg tablet, Take 1 tablet (75 mcg total) by mouth daily, Disp: 90 tablet, Rfl: 1    Allergies   Allergen Reactions    Seasonal Ic [Cholestatin] Itching     Itchy eyes, congestion       Social History   Past Surgical History:   Procedure Laterality Date     SECTION      MOUTH SURGERY Bilateral 2000    RI EXCIS PRIMARY GANGLION WRIST Left 3/15/2018    Procedure: VOLAR WRIST GANGLION CYST EXCISION;  Surgeon: Glendale Apley, MD;  Location:  MAIN OR;  Service: Orthopedics    WISDOM TOOTH EXTRACTION       Family History   Problem Relation Age of Onset    Cancer Maternal Grandmother     Cancer Maternal Grandfather     Diabetes Paternal Grandmother     No Known Problems Paternal Grandfather     No Known Problems Mother     No Known Problems Father        Objective:  /74 (BP Location: Left arm, Patient Position: Sitting, Cuff Size: Large)   Pulse 98   Temp 97 6 °F (36 4 °C) (Oral)   Ht 5' 4" (1 626 m)   Wt 84 4 kg (186 lb)   SpO2 99%   BMI 31 93 kg/m²     Recent Results (from the past 1344 hour(s))   POCT pregnancy, urine    Collection Time: 03/15/18  7:04 AM   Result Value Ref Range    EXT Preg Test, Ur Negative Negative            Physical Exam   Constitutional: She is oriented to person, place, and time  She appears well-developed and well-nourished  No distress  HENT:   Head: Normocephalic and atraumatic  Right Ear: External ear normal    Left Ear: External ear normal    Nose: Nose normal    Mouth/Throat: Oropharynx is clear and moist  No oropharyngeal exudate  Eyes: Conjunctivae and EOM are normal  Pupils are equal, round, and reactive to light  Right eye exhibits no discharge  Left eye exhibits no discharge  Neck: Normal range of motion  Neck supple  No thyromegaly present  Cardiovascular: Normal rate, regular rhythm, normal heart sounds and intact distal pulses  Exam reveals no gallop and no friction rub  No murmur heard  Pulmonary/Chest: Effort normal and breath sounds normal  No stridor  No respiratory distress  She has no wheezes  She has no rales  Abdominal: Soft  Bowel sounds are normal  She exhibits no distension  There is no tenderness  Lymphadenopathy:     She has no cervical adenopathy  Neurological: She is alert and oriented to person, place, and time  Skin: Skin is warm and dry  No rash noted  She is not diaphoretic  No erythema  Psychiatric: She has a normal mood and affect   Her behavior is normal  Judgment and thought content normal

## 2018-06-09 ENCOUNTER — APPOINTMENT (OUTPATIENT)
Dept: LAB | Age: 33
End: 2018-06-09
Payer: COMMERCIAL

## 2018-06-09 ENCOUNTER — TRANSCRIBE ORDERS (OUTPATIENT)
Dept: ADMINISTRATIVE | Age: 33
End: 2018-06-09

## 2018-06-09 DIAGNOSIS — I51.9 MYXEDEMA HEART DISEASE: ICD-10-CM

## 2018-06-09 DIAGNOSIS — O09.521 SUPERVISION OF ELDERLY MULTIGRAVIDA IN FIRST TRIMESTER: ICD-10-CM

## 2018-06-09 DIAGNOSIS — I51.9 MYXEDEMA HEART DISEASE: Primary | ICD-10-CM

## 2018-06-09 DIAGNOSIS — E03.9 MYXEDEMA HEART DISEASE: Primary | ICD-10-CM

## 2018-06-09 DIAGNOSIS — E03.9 MYXEDEMA HEART DISEASE: ICD-10-CM

## 2018-06-09 LAB
BASOPHILS # BLD AUTO: 0.01 THOUSANDS/ΜL (ref 0–0.1)
BASOPHILS NFR BLD AUTO: 0 % (ref 0–1)
BILIRUB UR QL STRIP: NEGATIVE
CLARITY UR: CLEAR
COLOR UR: YELLOW
CREAT UR-MCNC: 58 MG/DL
EOSINOPHIL # BLD AUTO: 0.07 THOUSAND/ΜL (ref 0–0.61)
EOSINOPHIL NFR BLD AUTO: 1 % (ref 0–6)
ERYTHROCYTE [DISTWIDTH] IN BLOOD BY AUTOMATED COUNT: 13.1 % (ref 11.6–15.1)
GLUCOSE 1H P 50 G GLC PO SERPL-MCNC: 130 MG/DL
GLUCOSE UR STRIP-MCNC: ABNORMAL MG/DL
HBV SURFACE AG SER QL: NORMAL
HCT VFR BLD AUTO: 38.3 % (ref 34.8–46.1)
HGB BLD-MCNC: 12.6 G/DL (ref 11.5–15.4)
HGB UR QL STRIP.AUTO: NEGATIVE
IMM GRANULOCYTES # BLD AUTO: 0.02 THOUSAND/UL (ref 0–0.2)
IMM GRANULOCYTES NFR BLD AUTO: 0 % (ref 0–2)
KETONES UR STRIP-MCNC: NEGATIVE MG/DL
LEUKOCYTE ESTERASE UR QL STRIP: NEGATIVE
LYMPHOCYTES # BLD AUTO: 1.74 THOUSANDS/ΜL (ref 0.6–4.47)
LYMPHOCYTES NFR BLD AUTO: 20 % (ref 14–44)
MCH RBC QN AUTO: 29.9 PG (ref 26.8–34.3)
MCHC RBC AUTO-ENTMCNC: 32.9 G/DL (ref 31.4–37.4)
MCV RBC AUTO: 91 FL (ref 82–98)
MONOCYTES # BLD AUTO: 0.42 THOUSAND/ΜL (ref 0.17–1.22)
MONOCYTES NFR BLD AUTO: 5 % (ref 4–12)
NEUTROPHILS # BLD AUTO: 6.58 THOUSANDS/ΜL (ref 1.85–7.62)
NEUTS SEG NFR BLD AUTO: 74 % (ref 43–75)
NITRITE UR QL STRIP: NEGATIVE
NRBC BLD AUTO-RTO: 0 /100 WBCS
PH UR STRIP.AUTO: 6.5 [PH] (ref 4.5–8)
PLATELET # BLD AUTO: 212 THOUSANDS/UL (ref 149–390)
PMV BLD AUTO: 11.1 FL (ref 8.9–12.7)
PROT UR STRIP-MCNC: NEGATIVE MG/DL
PROT UR-MCNC: 6 MG/DL
PROT/CREAT UR: 0.1 MG/G{CREAT} (ref 0–0.1)
RBC # BLD AUTO: 4.22 MILLION/UL (ref 3.81–5.12)
RUBV IGG SERPL IA-ACNC: 168 IU/ML
SP GR UR STRIP.AUTO: 1.01 (ref 1–1.03)
T4 FREE SERPL-MCNC: 1 NG/DL (ref 0.76–1.46)
TSH SERPL DL<=0.05 MIU/L-ACNC: 0.75 UIU/ML (ref 0.36–3.74)
UROBILINOGEN UR QL STRIP.AUTO: 0.2 E.U./DL
WBC # BLD AUTO: 8.84 THOUSAND/UL (ref 4.31–10.16)

## 2018-06-09 PROCEDURE — 87389 HIV-1 AG W/HIV-1&-2 AB AG IA: CPT

## 2018-06-09 PROCEDURE — 86787 VARICELLA-ZOSTER ANTIBODY: CPT

## 2018-06-09 PROCEDURE — 82570 ASSAY OF URINE CREATININE: CPT | Performed by: OBSTETRICS & GYNECOLOGY

## 2018-06-09 PROCEDURE — 84439 ASSAY OF FREE THYROXINE: CPT

## 2018-06-09 PROCEDURE — 84156 ASSAY OF PROTEIN URINE: CPT | Performed by: OBSTETRICS & GYNECOLOGY

## 2018-06-09 PROCEDURE — 36415 COLL VENOUS BLD VENIPUNCTURE: CPT

## 2018-06-09 PROCEDURE — 84443 ASSAY THYROID STIM HORMONE: CPT

## 2018-06-09 PROCEDURE — 86762 RUBELLA ANTIBODY: CPT

## 2018-06-09 PROCEDURE — 87340 HEPATITIS B SURFACE AG IA: CPT

## 2018-06-09 PROCEDURE — 82950 GLUCOSE TEST: CPT

## 2018-06-09 PROCEDURE — 86592 SYPHILIS TEST NON-TREP QUAL: CPT

## 2018-06-09 PROCEDURE — 81003 URINALYSIS AUTO W/O SCOPE: CPT | Performed by: OBSTETRICS & GYNECOLOGY

## 2018-06-09 PROCEDURE — 85025 COMPLETE CBC W/AUTO DIFF WBC: CPT

## 2018-06-11 LAB
HIV 1+2 AB+HIV1 P24 AG SERPL QL IA: NORMAL
RPR SER QL: NORMAL
RYE IGE QN: NEGATIVE

## 2018-06-12 ENCOUNTER — LAB REQUISITION (OUTPATIENT)
Dept: LAB | Facility: HOSPITAL | Age: 33
End: 2018-06-12
Payer: COMMERCIAL

## 2018-06-12 DIAGNOSIS — O09.521 SUPERVISION OF ELDERLY MULTIGRAVIDA IN FIRST TRIMESTER: ICD-10-CM

## 2018-06-12 LAB — VZV IGG SER IA-ACNC: NORMAL

## 2018-06-12 PROCEDURE — 87591 N.GONORRHOEAE DNA AMP PROB: CPT | Performed by: NURSE PRACTITIONER

## 2018-06-12 PROCEDURE — 87491 CHLMYD TRACH DNA AMP PROBE: CPT | Performed by: NURSE PRACTITIONER

## 2018-06-15 LAB
CHLAMYDIA DNA CVX QL NAA+PROBE: NORMAL
N GONORRHOEA DNA GENITAL QL NAA+PROBE: NORMAL

## 2018-06-19 ENCOUNTER — ROUTINE PRENATAL (OUTPATIENT)
Dept: PERINATAL CARE | Facility: CLINIC | Age: 33
End: 2018-06-19
Payer: COMMERCIAL

## 2018-06-19 VITALS
HEART RATE: 81 BPM | DIASTOLIC BLOOD PRESSURE: 74 MMHG | WEIGHT: 187.5 LBS | SYSTOLIC BLOOD PRESSURE: 113 MMHG | HEIGHT: 63 IN | BODY MASS INDEX: 33.22 KG/M2

## 2018-06-19 DIAGNOSIS — Z3A.13 13 WEEKS GESTATION OF PREGNANCY: ICD-10-CM

## 2018-06-19 DIAGNOSIS — O09.521 ELDERLY MULTIGRAVIDA, FIRST TRIMESTER: Primary | ICD-10-CM

## 2018-06-19 PROCEDURE — 76801 OB US < 14 WKS SINGLE FETUS: CPT | Performed by: OBSTETRICS & GYNECOLOGY

## 2018-06-19 PROCEDURE — 99241 PR OFFICE CONSULTATION NEW/ESTAB PATIENT 15 MIN: CPT | Performed by: OBSTETRICS & GYNECOLOGY

## 2018-06-19 PROCEDURE — 76813 OB US NUCHAL MEAS 1 GEST: CPT | Performed by: OBSTETRICS & GYNECOLOGY

## 2018-06-19 RX ORDER — ONDANSETRON 4 MG/1
TABLET, ORALLY DISINTEGRATING ORAL
COMMUNITY
End: 2018-12-20 | Stop reason: HOSPADM

## 2018-06-20 NOTE — PROGRESS NOTES
Please refer to the [facility] ultrasound report in Ob Procedures for additional information regarding the visit to the [facility]  today

## 2018-07-20 ENCOUNTER — OFFICE VISIT (OUTPATIENT)
Dept: INTERNAL MEDICINE CLINIC | Facility: CLINIC | Age: 33
End: 2018-07-20
Payer: COMMERCIAL

## 2018-07-20 VITALS
HEART RATE: 83 BPM | OXYGEN SATURATION: 98 % | DIASTOLIC BLOOD PRESSURE: 72 MMHG | WEIGHT: 183 LBS | TEMPERATURE: 97.9 F | BODY MASS INDEX: 32.43 KG/M2 | RESPIRATION RATE: 20 BRPM | SYSTOLIC BLOOD PRESSURE: 110 MMHG | HEIGHT: 63 IN

## 2018-07-20 DIAGNOSIS — H10.32 ACUTE BACTERIAL CONJUNCTIVITIS OF LEFT EYE: Primary | ICD-10-CM

## 2018-07-20 PROCEDURE — 99212 OFFICE O/P EST SF 10 MIN: CPT | Performed by: NURSE PRACTITIONER

## 2018-07-20 PROCEDURE — 3008F BODY MASS INDEX DOCD: CPT | Performed by: NURSE PRACTITIONER

## 2018-07-20 RX ORDER — OFLOXACIN 3 MG/ML
SOLUTION/ DROPS OPHTHALMIC
Qty: 5 ML | Refills: 0 | Status: SHIPPED | OUTPATIENT
Start: 2018-07-20 | End: 2018-07-20 | Stop reason: SDUPTHER

## 2018-07-20 RX ORDER — OFLOXACIN 3 MG/ML
SOLUTION/ DROPS OPHTHALMIC
Qty: 5 ML | Refills: 0 | Status: SHIPPED | OUTPATIENT
Start: 2018-07-20 | End: 2018-07-27

## 2018-07-20 NOTE — PROGRESS NOTES
Assessment/Plan:     Diagnoses and all orders for this visit:    Acute bacterial conjunctivitis of left eye  -     Discontinue: ofloxacin (OCUFLOX) 0 3 % ophthalmic solution; 1-2 gtt in left eye every 2-4 hours x 2 days then 1-2 gtt four times a day x 5 days  -     ofloxacin (OCUFLOX) 0 3 % ophthalmic solution; 1-2 gtt in left eye every 2-4 hours x 2 days then 1-2 gtt four times a day x 5 days        Subjective:      Patient ID: Elkin Catalan is a 26 y o  female  Subjective    Elkin Catalan is a 26 y o  female who presents for evaluation of blurred vision, discharge, erythema, itching, pain and tearing in the left eye  She has noticed the above symptoms for 2 days  Onset was acute, associated with erythema and pain  Patient denies foreign body sensation, photophobia and visual field deficit  There is a history of contact lens use and wearing glasses  The following portions of the patient's history were reviewed and updated as appropriate: allergies, current medications, past family history, past medical history, past surgical history and problem list     Review of Systems   Constitutional: Negative  HENT: Negative  Eyes: Positive for pain, discharge, redness, itching and visual disturbance (blurry)  Respiratory: Negative  Skin: Negative  Neurological: Negative for dizziness and headaches  Objective:      /72 (BP Location: Left arm, Patient Position: Sitting, Cuff Size: Large)   Pulse 83   Temp 97 9 °F (36 6 °C) (Oral)   Resp 20   Ht 5' 3" (1 6 m)   Wt 83 kg (183 lb)   LMP 02/09/2018   SpO2 98%   BMI 32 42 kg/m²          Physical Exam   Constitutional: She appears well-developed and well-nourished  HENT:   Head: Normocephalic and atraumatic  Right Ear: External ear normal    Left Ear: External ear normal    Nose: Nose normal    Mouth/Throat: Oropharynx is clear and moist    Erythema, watery discharge   Eyes: Pupils are equal, round, and reactive to light  Left eye exhibits discharge (watery)  No scleral icterus  Right eye conjunctivae - normal   Left eye conjunctivae - erythema, watery discharge  Skin: Skin is warm and dry

## 2018-07-20 NOTE — PATIENT INSTRUCTIONS
eye drops and use as directed  Conjunctivitis is contagious  Use good hand hygiene  Follow up if symptoms worsen or do not improve  Conjunctivitis   WHAT YOU SHOULD KNOW:   Conjunctivitis, or pink eye, is inflammation of your conjunctiva  The conjunctiva is a thin tissue that covers the front of your eye and the back of your eyelids  The conjunctiva helps protect your eye and keep it moist         INSTRUCTIONS:   Medicines:   · Allergy medicine: This medicine helps decrease itchy, red, swollen eyes caused by allergies  It may be given as a pill, eye drops, or nasal spray  · Antibiotics:  You will need antibiotics if your conjunctivitis is caused by bacteria  This medicine may be given as eye drops or eye ointment  · Steroid medicine: This medicine helps decrease inflammation  It may be given as a pill, eye drops, or nasal spray  · Take your medicine as directed  Call your healthcare provider if you think your medicine is not helping or if you have side effects  Tell him if you are allergic to any medicine  Keep a list of the medicines, vitamins, and herbs you take  Include the amounts, and when and why you take them  Bring the list or the pill bottles to follow-up visits  Carry your medicine list with you in case of an emergency  Follow up with your primary healthcare provider as directed: You may need to return for more tests on your eyes  These will help your primary healthcare provider check for eye damage  Write down your questions so you remember to ask them during your visits  Avoid the spread of conjunctivitis:   · Wash your hands often:  Wash your hands before you touch your eyes  Also wash your hands before you prepare or eat food and after you use the bathroom or change a diaper  · Avoid allergens:  Try to avoid the things that cause your allergies, such as pets, dust, or grass  · Avoid contact:  Do not share towels or washcloths   Try to stay away from others as much as possible  Ask when you can return to work or school  · Throw away eye makeup:  Throw away mascara and other eye makeup  Manage your symptoms:  · Apply a cool compress:  Wet a washcloth with cold water and place it on your eye  This will help decrease swelling  · Use eye drops:  Eye drops, or artificial tears, can be bought without a doctor's order  They help keep your eye moist     · Do not wear contact lenses: They can irritate your eye  Throw away the pair you are using and ask when you can wear them again  Use a new pair of lenses when your primary healthcare provider says it is okay  · Flush your eye:  You may need to flush your eye with saline to help decrease your symptoms  Ask for more information on how to flush your eye  Contact your primary healthcare provider if:   · Your eyesight becomes blurry  · You have tiny bumps or spots of blood on your eye  · You have questions or concerns about your condition or care  Return to the emergency department if:   · The swelling in your eye gets worse, even after treatment  · Your vision suddenly becomes worse or you cannot see at all  · Your eye begins to bleed  © 2014 [address] is for End User's use only and may not be sold, redistributed or otherwise used for commercial purposes  All illustrations and images included in CareNotes® are the copyrighted property of [employer]  or Juvenal Arriaga  The above information is an  only  It is not intended as medical advice for individual conditions or treatments  Talk to your doctor, nurse or pharmacist before following any medical regimen to see if it is safe and effective for you

## 2018-07-20 NOTE — LETTER
July 20, 2018     Patient: Michael Wharton   YOB: 1985   Date of Visit: 7/20/2018       To Whom it May Concern:    Robert Bartholomew is under my professional care  She was seen in my office on 7/20/2018  She may return to work on July 23, 2018  If you have any questions or concerns, please don't hesitate to call           Sincerely,          HARSHA Blackman        CC: No Recipients

## 2018-07-27 ENCOUNTER — CLINICAL SUPPORT (OUTPATIENT)
Dept: INTERNAL MEDICINE CLINIC | Facility: CLINIC | Age: 33
End: 2018-07-27
Payer: COMMERCIAL

## 2018-07-27 DIAGNOSIS — Z00.8 ENCOUNTER FOR OTHER GENERAL EXAMINATION: Primary | ICD-10-CM

## 2018-07-27 DIAGNOSIS — O09.92 SUPERVISION OF HIGH RISK PREGNANCY IN SECOND TRIMESTER: Primary | ICD-10-CM

## 2018-07-27 DIAGNOSIS — Z00.8 ENCOUNTER FOR OTHER GENERAL EXAMINATION: ICD-10-CM

## 2018-07-27 LAB
ABO GROUP BLD: NORMAL
BLD GP AB SCN SERPL QL: NEGATIVE
CHOLEST SERPL-MCNC: 216 MG/DL (ref 50–200)
EST. AVERAGE GLUCOSE BLD GHB EST-MCNC: 108 MG/DL
HBA1C MFR BLD: 5.4 % (ref 4.2–6.3)
HDLC SERPL-MCNC: 57 MG/DL (ref 40–60)
LDLC SERPL CALC-MCNC: 111 MG/DL (ref 0–100)
NONHDLC SERPL-MCNC: 159 MG/DL
RH BLD: POSITIVE
SPECIMEN EXPIRATION DATE: NORMAL
TRIGL SERPL-MCNC: 240 MG/DL

## 2018-07-27 PROCEDURE — 86850 RBC ANTIBODY SCREEN: CPT | Performed by: OBSTETRICS & GYNECOLOGY

## 2018-07-27 PROCEDURE — 36415 COLL VENOUS BLD VENIPUNCTURE: CPT

## 2018-07-27 PROCEDURE — 86900 BLOOD TYPING SEROLOGIC ABO: CPT | Performed by: OBSTETRICS & GYNECOLOGY

## 2018-07-27 PROCEDURE — 80061 LIPID PANEL: CPT | Performed by: PREVENTIVE MEDICINE

## 2018-07-27 PROCEDURE — 86901 BLOOD TYPING SEROLOGIC RH(D): CPT | Performed by: OBSTETRICS & GYNECOLOGY

## 2018-07-27 PROCEDURE — 83036 HEMOGLOBIN GLYCOSYLATED A1C: CPT | Performed by: PREVENTIVE MEDICINE

## 2018-08-19 ENCOUNTER — OFFICE VISIT (OUTPATIENT)
Dept: URGENT CARE | Age: 33
End: 2018-08-19
Payer: COMMERCIAL

## 2018-08-19 VITALS
HEIGHT: 63 IN | TEMPERATURE: 98.1 F | SYSTOLIC BLOOD PRESSURE: 118 MMHG | WEIGHT: 187 LBS | OXYGEN SATURATION: 98 % | DIASTOLIC BLOOD PRESSURE: 72 MMHG | HEART RATE: 96 BPM | BODY MASS INDEX: 33.13 KG/M2

## 2018-08-19 DIAGNOSIS — R12 HEARTBURN DURING PREGNANCY IN SECOND TRIMESTER: Primary | ICD-10-CM

## 2018-08-19 DIAGNOSIS — O26.892 HEARTBURN DURING PREGNANCY IN SECOND TRIMESTER: Primary | ICD-10-CM

## 2018-08-19 PROCEDURE — 99214 OFFICE O/P EST MOD 30 MIN: CPT | Performed by: FAMILY MEDICINE

## 2018-08-19 PROCEDURE — S9088 SERVICES PROVIDED IN URGENT: HCPCS | Performed by: FAMILY MEDICINE

## 2018-08-19 NOTE — PATIENT INSTRUCTIONS
Increase fluids to at least 10 cups of water a day  Eat smaller meals and stay upright for at least one hour after eating  Begin Zantac at bedtime (or during the day  by 4 hours from your thyroid medication)   Take colace at bedtime  Contact your OB tomorrow to discuss  If worsening, or if you sense decreased fetal movement ,go to the ER immediately for further evaluation

## 2018-08-19 NOTE — PROGRESS NOTES
[facility]        NAME: Evelyn Prado is a 26 y o  female  : 1985    MRN: [id number]  DATE: 2018  TIME: 6:36 PM    Assessment and Plan   Heartburn during pregnancy in second trimester [O26 892, R12]  1  Heartburn during pregnancy in second trimester           Patient Instructions     Increase fluids to at least 10 cups of water a day  Eat smaller meals and stay upright for at least one hour after eating  Begin Zantac at bedtime (or during the day  by 4 hours from your thyroid medication)   Take colace at bedtime  Contact your OB tomorrow to discuss  If worsening, or if you sense decreased fetal movement ,go to the ER immediately for further evaluation  Chief Complaint     Chief Complaint   Patient presents with    Headache    Abdominal Pain    Heartburn         History of Present Illness       25-year-old female who is 22 weeks pregnant presents with complaints of acid reflux since last night, which was unresponsive to Harry  She also complains of a decreased appetite  She has been constipated, and states that she has some slight abdominal cramping, which she attributes to the constipation  She states that the cramping feels GI in nature, and not related to her pregnancy  She has good fetal movement  She states she did have a bowel movement this morning, but states she has not had a BM for several days prior to today  She states she knows she is not drinking enough fluids  She has not tried any OTC stool softeners  She states "I am not concerned about the baby, but I didn't know what I was allowed to take"  Review of Systems   Review of Systems   Constitutional: Negative  Respiratory: Negative  Cardiovascular: Negative  Gastrointestinal: Positive for constipation and nausea (Reflux)  Abdominal pain: as noted in HPI  All other systems reviewed and are negative          Current Medications       Current Outpatient Prescriptions:    levothyroxine 75 mcg tablet, Take 1 tablet (75 mcg total) by mouth daily, Disp: 90 tablet, Rfl: 1    ondansetron (ZOFRAN-ODT) 4 mg disintegrating tablet, Take 2 tablets every 8 hours by oral route for 15 days  , Disp: , Rfl:     Prenatal MV-Min-Fe Fum-FA-DHA (PRENATAL 1 PO), Prenatal, Disp: , Rfl:     Current Allergies     Allergies as of 2018 - Reviewed 2018   Allergen Reaction Noted    Seasonal ic [cholestatin] Itching 2018            The following portions of the patient's history were reviewed and updated as appropriate: allergies, current medications, past family history, past medical history, past social history, past surgical history and problem list    Past Medical History:   Diagnosis Date    Gastroenteritis     last assessed: 16    Hypothyroidism 2018    Plantar fasciitis, right     last assessed: 04/16/15    PONV (postoperative nausea and vomiting)     Seizures ([address] )     last assessed: 08/15/17    Thyroid disease     last assessed: 08/15/17    Viral gastroenteritis     last assessed: 17     Past Surgical History:   Procedure Laterality Date     SECTION      MOUTH SURGERY Bilateral 2000    [place] EXCIS PRIMARY GANGLION WRIST Left 3/15/2018    Procedure: VOLAR WRIST GANGLION CYST EXCISION;  Surgeon: Demetris Lopez MD;  Location:  MAIN OR;  Service: Orthopedics    WISDOM TOOTH EXTRACTION             Objective   /72 (BP Location: Left arm, Patient Position: Sitting, Cuff Size: Large)   Pulse 96   Temp 98 1 °F (36 7 °C) (Temporal)   Ht 5' 3" (1 6 m)   Wt 84 8 kg (187 lb)   LMP 2018   SpO2 98%   BMI 33 13 kg/m²        Physical Exam     Physical Exam   Constitutional: She is oriented to person, place, and time  She appears well-developed and well-nourished  Cardiovascular: Normal rate, regular rhythm and normal heart sounds      Pulmonary/Chest: Effort normal and breath sounds normal    Abdominal: Bowel sounds are normal  There is no tenderness  There is no rigidity, no rebound and no guarding  Gravid  Fundal height at umbilicus  Neurological: She is alert and oriented to person, place, and time  Psychiatric: She has a normal mood and affect  Her behavior is normal    Nursing note and vitals reviewed

## 2018-08-19 NOTE — PROGRESS NOTES
C/O heartburn and abd  Cramps yesterday  H/A started today  Tums didn't help  Had nausea with it, but didn't use the Zofran  Pt is 22 weeks pregnant

## 2018-08-23 ENCOUNTER — CLINICAL SUPPORT (OUTPATIENT)
Dept: INTERNAL MEDICINE CLINIC | Facility: CLINIC | Age: 33
End: 2018-08-23
Payer: COMMERCIAL

## 2018-08-23 DIAGNOSIS — E03.9 HYPOTHYROIDISM, UNSPECIFIED TYPE: Primary | ICD-10-CM

## 2018-08-23 DIAGNOSIS — O26.819 FATIGUE DURING PREGNANCY, ANTEPARTUM: ICD-10-CM

## 2018-08-23 LAB
ALBUMIN SERPL BCP-MCNC: 3.2 G/DL (ref 3.5–5)
ALP SERPL-CCNC: 87 U/L (ref 46–116)
ALT SERPL W P-5'-P-CCNC: 54 U/L (ref 12–78)
ANION GAP SERPL CALCULATED.3IONS-SCNC: 9 MMOL/L (ref 4–13)
AST SERPL W P-5'-P-CCNC: 32 U/L (ref 5–45)
BASOPHILS # BLD AUTO: 0.01 THOUSANDS/ΜL (ref 0–0.1)
BASOPHILS NFR BLD AUTO: 0 % (ref 0–1)
BILIRUB SERPL-MCNC: 0.21 MG/DL (ref 0.2–1)
BUN SERPL-MCNC: 10 MG/DL (ref 5–25)
CALCIUM SERPL-MCNC: 9.6 MG/DL (ref 8.3–10.1)
CHLORIDE SERPL-SCNC: 103 MMOL/L (ref 100–108)
CO2 SERPL-SCNC: 25 MMOL/L (ref 21–32)
CREAT SERPL-MCNC: 0.63 MG/DL (ref 0.6–1.3)
EOSINOPHIL # BLD AUTO: 0.05 THOUSAND/ΜL (ref 0–0.61)
EOSINOPHIL NFR BLD AUTO: 1 % (ref 0–6)
ERYTHROCYTE [DISTWIDTH] IN BLOOD BY AUTOMATED COUNT: 13.3 % (ref 11.6–15.1)
GFR SERPL CREATININE-BSD FRML MDRD: 117 ML/MIN/1.73SQ M
GLUCOSE SERPL-MCNC: 83 MG/DL (ref 65–140)
HCT VFR BLD AUTO: 35.5 % (ref 34.8–46.1)
HGB BLD-MCNC: 11.2 G/DL (ref 11.5–15.4)
IMM GRANULOCYTES # BLD AUTO: 0.05 THOUSAND/UL (ref 0–0.2)
IMM GRANULOCYTES NFR BLD AUTO: 1 % (ref 0–2)
LYMPHOCYTES # BLD AUTO: 2.19 THOUSANDS/ΜL (ref 0.6–4.47)
LYMPHOCYTES NFR BLD AUTO: 23 % (ref 14–44)
MCH RBC QN AUTO: 29.8 PG (ref 26.8–34.3)
MCHC RBC AUTO-ENTMCNC: 31.5 G/DL (ref 31.4–37.4)
MCV RBC AUTO: 94 FL (ref 82–98)
MONOCYTES # BLD AUTO: 0.49 THOUSAND/ΜL (ref 0.17–1.22)
MONOCYTES NFR BLD AUTO: 5 % (ref 4–12)
NEUTROPHILS # BLD AUTO: 6.57 THOUSANDS/ΜL (ref 1.85–7.62)
NEUTS SEG NFR BLD AUTO: 70 % (ref 43–75)
NRBC BLD AUTO-RTO: 0 /100 WBCS
PLATELET # BLD AUTO: 156 THOUSANDS/UL (ref 149–390)
PMV BLD AUTO: 11.6 FL (ref 8.9–12.7)
POTASSIUM SERPL-SCNC: 4.1 MMOL/L (ref 3.5–5.3)
PROT SERPL-MCNC: 7 G/DL (ref 6.4–8.2)
RBC # BLD AUTO: 3.76 MILLION/UL (ref 3.81–5.12)
SODIUM SERPL-SCNC: 137 MMOL/L (ref 136–145)
T4 FREE SERPL-MCNC: 0.85 NG/DL (ref 0.76–1.46)
TSH SERPL DL<=0.05 MIU/L-ACNC: 1.41 UIU/ML (ref 0.36–3.74)
WBC # BLD AUTO: 9.36 THOUSAND/UL (ref 4.31–10.16)

## 2018-08-23 PROCEDURE — 84443 ASSAY THYROID STIM HORMONE: CPT | Performed by: OBSTETRICS & GYNECOLOGY

## 2018-08-23 PROCEDURE — 84439 ASSAY OF FREE THYROXINE: CPT | Performed by: OBSTETRICS & GYNECOLOGY

## 2018-08-23 PROCEDURE — 80053 COMPREHEN METABOLIC PANEL: CPT | Performed by: OBSTETRICS & GYNECOLOGY

## 2018-08-23 PROCEDURE — 85025 COMPLETE CBC W/AUTO DIFF WBC: CPT | Performed by: OBSTETRICS & GYNECOLOGY

## 2018-08-23 PROCEDURE — 36415 COLL VENOUS BLD VENIPUNCTURE: CPT

## 2018-09-29 ENCOUNTER — APPOINTMENT (OUTPATIENT)
Dept: LAB | Age: 33
End: 2018-09-29
Payer: COMMERCIAL

## 2018-09-29 ENCOUNTER — TRANSCRIBE ORDERS (OUTPATIENT)
Dept: ADMINISTRATIVE | Age: 33
End: 2018-09-29

## 2018-09-29 DIAGNOSIS — O09.92 SUPERVISION OF HIGH RISK PREGNANCY IN SECOND TRIMESTER: Primary | ICD-10-CM

## 2018-09-29 DIAGNOSIS — O09.92 SUPERVISION OF HIGH RISK PREGNANCY IN SECOND TRIMESTER: ICD-10-CM

## 2018-09-29 LAB
BASOPHILS # BLD AUTO: 0.02 THOUSANDS/ΜL (ref 0–0.1)
BASOPHILS NFR BLD AUTO: 0 % (ref 0–1)
EOSINOPHIL # BLD AUTO: 0.09 THOUSAND/ΜL (ref 0–0.61)
EOSINOPHIL NFR BLD AUTO: 1 % (ref 0–6)
ERYTHROCYTE [DISTWIDTH] IN BLOOD BY AUTOMATED COUNT: 13.2 % (ref 11.6–15.1)
GLUCOSE 1H P 50 G GLC PO SERPL-MCNC: 171 MG/DL
HCT VFR BLD AUTO: 36.2 % (ref 34.8–46.1)
HGB BLD-MCNC: 11.6 G/DL (ref 11.5–15.4)
IMM GRANULOCYTES # BLD AUTO: 0.04 THOUSAND/UL (ref 0–0.2)
IMM GRANULOCYTES NFR BLD AUTO: 0 % (ref 0–2)
LYMPHOCYTES # BLD AUTO: 1.75 THOUSANDS/ΜL (ref 0.6–4.47)
LYMPHOCYTES NFR BLD AUTO: 20 % (ref 14–44)
MCH RBC QN AUTO: 30.4 PG (ref 26.8–34.3)
MCHC RBC AUTO-ENTMCNC: 32 G/DL (ref 31.4–37.4)
MCV RBC AUTO: 95 FL (ref 82–98)
MONOCYTES # BLD AUTO: 0.44 THOUSAND/ΜL (ref 0.17–1.22)
MONOCYTES NFR BLD AUTO: 5 % (ref 4–12)
NEUTROPHILS # BLD AUTO: 6.64 THOUSANDS/ΜL (ref 1.85–7.62)
NEUTS SEG NFR BLD AUTO: 74 % (ref 43–75)
NRBC BLD AUTO-RTO: 0 /100 WBCS
PLATELET # BLD AUTO: 168 THOUSANDS/UL (ref 149–390)
PMV BLD AUTO: 12.2 FL (ref 8.9–12.7)
RBC # BLD AUTO: 3.81 MILLION/UL (ref 3.81–5.12)
T4 FREE SERPL-MCNC: 0.83 NG/DL (ref 0.76–1.46)
TSH SERPL DL<=0.05 MIU/L-ACNC: 0.52 UIU/ML (ref 0.36–3.74)
WBC # BLD AUTO: 8.98 THOUSAND/UL (ref 4.31–10.16)

## 2018-09-29 PROCEDURE — 84439 ASSAY OF FREE THYROXINE: CPT

## 2018-09-29 PROCEDURE — 85025 COMPLETE CBC W/AUTO DIFF WBC: CPT

## 2018-09-29 PROCEDURE — 36415 COLL VENOUS BLD VENIPUNCTURE: CPT

## 2018-09-29 PROCEDURE — 82950 GLUCOSE TEST: CPT

## 2018-09-29 PROCEDURE — 86592 SYPHILIS TEST NON-TREP QUAL: CPT

## 2018-09-29 PROCEDURE — 84443 ASSAY THYROID STIM HORMONE: CPT

## 2018-10-01 LAB — RPR SER QL: NORMAL

## 2018-10-06 ENCOUNTER — TRANSCRIBE ORDERS (OUTPATIENT)
Dept: LAB | Facility: HOSPITAL | Age: 33
End: 2018-10-06

## 2018-10-06 ENCOUNTER — APPOINTMENT (OUTPATIENT)
Dept: LAB | Facility: HOSPITAL | Age: 33
End: 2018-10-06
Attending: OBSTETRICS & GYNECOLOGY
Payer: COMMERCIAL

## 2018-10-06 DIAGNOSIS — O99.810 ABNORMAL MATERNAL GLUCOSE TOLERANCE, ANTEPARTUM: Primary | ICD-10-CM

## 2018-10-06 DIAGNOSIS — O99.810 ABNORMAL MATERNAL GLUCOSE TOLERANCE, ANTEPARTUM: ICD-10-CM

## 2018-10-06 LAB — GLUCOSE P FAST SERPL-MCNC: 97 MG/DL (ref 65–99)

## 2018-10-06 PROCEDURE — 82951 GLUCOSE TOLERANCE TEST (GTT): CPT

## 2018-10-06 PROCEDURE — 36415 COLL VENOUS BLD VENIPUNCTURE: CPT

## 2018-11-26 ENCOUNTER — LAB REQUISITION (OUTPATIENT)
Dept: LAB | Facility: HOSPITAL | Age: 33
End: 2018-11-26
Payer: COMMERCIAL

## 2018-11-26 DIAGNOSIS — O09.523 SUPERVISION OF ELDERLY MULTIGRAVIDA IN THIRD TRIMESTER: ICD-10-CM

## 2018-11-26 PROCEDURE — 87653 STREP B DNA AMP PROBE: CPT | Performed by: OBSTETRICS & GYNECOLOGY

## 2018-11-28 LAB — GP B STREP DNA SPEC QL NAA+PROBE: NORMAL

## 2018-12-17 ENCOUNTER — ANESTHESIA EVENT (INPATIENT)
Dept: LABOR AND DELIVERY | Facility: HOSPITAL | Age: 33
End: 2018-12-17
Payer: COMMERCIAL

## 2018-12-17 ENCOUNTER — ANESTHESIA (INPATIENT)
Dept: LABOR AND DELIVERY | Facility: HOSPITAL | Age: 33
End: 2018-12-17
Payer: COMMERCIAL

## 2018-12-17 ENCOUNTER — HOSPITAL ENCOUNTER (INPATIENT)
Facility: HOSPITAL | Age: 33
LOS: 3 days | Discharge: HOME | End: 2018-12-20
Attending: OBSTETRICS & GYNECOLOGY | Admitting: OBSTETRICS & GYNECOLOGY
Payer: COMMERCIAL

## 2018-12-17 DIAGNOSIS — Z3A.39 39 WEEKS GESTATION OF PREGNANCY: Primary | ICD-10-CM

## 2018-12-17 DIAGNOSIS — Z98.891 S/P REPEAT LOW TRANSVERSE C-SECTION: ICD-10-CM

## 2018-12-17 PROBLEM — Z98.51 S/P TUBAL LIGATION: Status: ACTIVE | Noted: 2018-12-17

## 2018-12-17 LAB
ABO GROUP BLD: NORMAL
BASE EXCESS BLDCOA CALC-SCNC: -1.8 MMOL/L (ref 3–11)
BASE EXCESS BLDCOV CALC-SCNC: -1.2 MMOL/L (ref 1–9)
BASOPHILS # BLD AUTO: 0.03 THOUSANDS/ΜL (ref 0–0.1)
BASOPHILS NFR BLD AUTO: 0 % (ref 0–1)
BLD GP AB SCN SERPL QL: NEGATIVE
EOSINOPHIL # BLD AUTO: 0.06 THOUSAND/ΜL (ref 0–0.61)
EOSINOPHIL NFR BLD AUTO: 1 % (ref 0–6)
ERYTHROCYTE [DISTWIDTH] IN BLOOD BY AUTOMATED COUNT: 13.1 % (ref 11.6–15.1)
GLUCOSE SERPL-MCNC: 76 MG/DL (ref 65–140)
HCO3 BLDCOA-SCNC: 26.2 MMOL/L (ref 17.3–27.3)
HCO3 BLDCOV-SCNC: 25 MMOL/L (ref 12.2–28.6)
HCT VFR BLD AUTO: 38.5 % (ref 34.8–46.1)
HGB BLD-MCNC: 12.6 G/DL (ref 11.5–15.4)
IMM GRANULOCYTES # BLD AUTO: 0.04 THOUSAND/UL (ref 0–0.2)
IMM GRANULOCYTES NFR BLD AUTO: 1 % (ref 0–2)
LYMPHOCYTES # BLD AUTO: 2.44 THOUSANDS/ΜL (ref 0.6–4.47)
LYMPHOCYTES NFR BLD AUTO: 28 % (ref 14–44)
MCH RBC QN AUTO: 30.3 PG (ref 26.8–34.3)
MCHC RBC AUTO-ENTMCNC: 32.7 G/DL (ref 31.4–37.4)
MCV RBC AUTO: 93 FL (ref 82–98)
MONOCYTES # BLD AUTO: 0.56 THOUSAND/ΜL (ref 0.17–1.22)
MONOCYTES NFR BLD AUTO: 7 % (ref 4–12)
NEUTROPHILS # BLD AUTO: 5.49 THOUSANDS/ΜL (ref 1.85–7.62)
NEUTS SEG NFR BLD AUTO: 63 % (ref 43–75)
NRBC BLD AUTO-RTO: 0 /100 WBCS
O2 CT VFR BLDCOA CALC: 8.1 ML/DL
OXYHGB MFR BLDCOA: 33.5 %
OXYHGB MFR BLDCOV: 58.6 %
PCO2 BLDCOA: 56.8 MM[HG] (ref 30–60)
PCO2 BLDCOV: 46.9 MM HG (ref 27–43)
PH BLDCOA: 7.28 [PH] (ref 7.23–7.43)
PH BLDCOV: 7.34 [PH] (ref 7.19–7.49)
PLATELET # BLD AUTO: 166 THOUSANDS/UL (ref 149–390)
PMV BLD AUTO: 11 FL (ref 8.9–12.7)
PO2 BLDCOA: 16.7 MM HG (ref 5–25)
PO2 BLDCOV: 24 MM HG (ref 15–45)
RBC # BLD AUTO: 4.16 MILLION/UL (ref 3.81–5.12)
RH BLD: POSITIVE
RPR SER QL: NORMAL
SAO2 % BLDCOV: 13.5 ML/DL
SPECIMEN EXPIRATION DATE: NORMAL
WBC # BLD AUTO: 8.62 THOUSAND/UL (ref 4.31–10.16)

## 2018-12-17 PROCEDURE — 82805 BLOOD GASES W/O2 SATURATION: CPT | Performed by: OBSTETRICS & GYNECOLOGY

## 2018-12-17 PROCEDURE — 86901 BLOOD TYPING SEROLOGIC RH(D): CPT | Performed by: OBSTETRICS & GYNECOLOGY

## 2018-12-17 PROCEDURE — 85025 COMPLETE CBC W/AUTO DIFF WBC: CPT | Performed by: OBSTETRICS & GYNECOLOGY

## 2018-12-17 PROCEDURE — 86592 SYPHILIS TEST NON-TREP QUAL: CPT | Performed by: OBSTETRICS & GYNECOLOGY

## 2018-12-17 PROCEDURE — 88302 TISSUE EXAM BY PATHOLOGIST: CPT | Performed by: PATHOLOGY

## 2018-12-17 PROCEDURE — 86900 BLOOD TYPING SEROLOGIC ABO: CPT | Performed by: OBSTETRICS & GYNECOLOGY

## 2018-12-17 PROCEDURE — 82948 REAGENT STRIP/BLOOD GLUCOSE: CPT

## 2018-12-17 PROCEDURE — 0UL70ZZ OCCLUSION OF BILATERAL FALLOPIAN TUBES, OPEN APPROACH: ICD-10-PCS | Performed by: OBSTETRICS & GYNECOLOGY

## 2018-12-17 PROCEDURE — 86850 RBC ANTIBODY SCREEN: CPT | Performed by: OBSTETRICS & GYNECOLOGY

## 2018-12-17 RX ORDER — ACETAMINOPHEN 325 MG/1
650 TABLET ORAL EVERY 4 HOURS PRN
Status: DISCONTINUED | OUTPATIENT
Start: 2018-12-17 | End: 2018-12-20 | Stop reason: HOSPADM

## 2018-12-17 RX ORDER — OXYCODONE HYDROCHLORIDE AND ACETAMINOPHEN 5; 325 MG/1; MG/1
2 TABLET ORAL EVERY 6 HOURS PRN
Status: DISCONTINUED | OUTPATIENT
Start: 2018-12-17 | End: 2018-12-20 | Stop reason: HOSPADM

## 2018-12-17 RX ORDER — SODIUM CHLORIDE, SODIUM LACTATE, POTASSIUM CHLORIDE, CALCIUM CHLORIDE 600; 310; 30; 20 MG/100ML; MG/100ML; MG/100ML; MG/100ML
125 INJECTION, SOLUTION INTRAVENOUS CONTINUOUS
Status: DISCONTINUED | OUTPATIENT
Start: 2018-12-17 | End: 2018-12-17

## 2018-12-17 RX ORDER — DEXAMETHASONE SODIUM PHOSPHATE 4 MG/ML
4 INJECTION, SOLUTION INTRA-ARTICULAR; INTRALESIONAL; INTRAMUSCULAR; INTRAVENOUS; SOFT TISSUE ONCE AS NEEDED
Status: DISPENSED | OUTPATIENT
Start: 2018-12-17 | End: 2018-12-18

## 2018-12-17 RX ORDER — ONDANSETRON 2 MG/ML
4 INJECTION INTRAMUSCULAR; INTRAVENOUS ONCE AS NEEDED
Status: DISCONTINUED | OUTPATIENT
Start: 2018-12-17 | End: 2018-12-20 | Stop reason: HOSPADM

## 2018-12-17 RX ORDER — CEFAZOLIN SODIUM 1 G/50ML
SOLUTION INTRAVENOUS AS NEEDED
Status: DISCONTINUED | OUTPATIENT
Start: 2018-12-17 | End: 2018-12-17

## 2018-12-17 RX ORDER — CEFAZOLIN SODIUM 2 G/50ML
2000 SOLUTION INTRAVENOUS ONCE
Status: COMPLETED | OUTPATIENT
Start: 2018-12-17 | End: 2018-12-17

## 2018-12-17 RX ORDER — NALOXONE HYDROCHLORIDE 0.4 MG/ML
0.1 INJECTION, SOLUTION INTRAMUSCULAR; INTRAVENOUS; SUBCUTANEOUS
Status: ACTIVE | OUTPATIENT
Start: 2018-12-17 | End: 2018-12-18

## 2018-12-17 RX ORDER — ALBUTEROL SULFATE 2.5 MG/3ML
2.5 SOLUTION RESPIRATORY (INHALATION) ONCE AS NEEDED
Status: DISCONTINUED | OUTPATIENT
Start: 2018-12-17 | End: 2018-12-20 | Stop reason: HOSPADM

## 2018-12-17 RX ORDER — HYDROMORPHONE HCL/PF 1 MG/ML
0.5 SYRINGE (ML) INJECTION EVERY 2 HOUR PRN
Status: DISCONTINUED | OUTPATIENT
Start: 2018-12-18 | End: 2018-12-20 | Stop reason: HOSPADM

## 2018-12-17 RX ORDER — FENTANYL CITRATE/PF 50 MCG/ML
25 SYRINGE (ML) INJECTION
Status: DISCONTINUED | OUTPATIENT
Start: 2018-12-17 | End: 2018-12-20 | Stop reason: HOSPADM

## 2018-12-17 RX ORDER — MORPHINE SULFATE 1 MG/ML
INJECTION, SOLUTION EPIDURAL; INTRATHECAL; INTRAVENOUS AS NEEDED
Status: DISCONTINUED | OUTPATIENT
Start: 2018-12-17 | End: 2018-12-17

## 2018-12-17 RX ORDER — MORPHINE SULFATE 0.5 MG/ML
INJECTION, SOLUTION EPIDURAL; INTRATHECAL; INTRAVENOUS AS NEEDED
Status: DISCONTINUED | OUTPATIENT
Start: 2018-12-17 | End: 2018-12-17 | Stop reason: SURG

## 2018-12-17 RX ORDER — LEVOTHYROXINE SODIUM 0.03 MG/1
75 TABLET ORAL
Status: DISCONTINUED | OUTPATIENT
Start: 2018-12-17 | End: 2018-12-20 | Stop reason: HOSPADM

## 2018-12-17 RX ORDER — PROMETHAZINE HYDROCHLORIDE 25 MG/ML
12.5 INJECTION, SOLUTION INTRAMUSCULAR; INTRAVENOUS
Status: DISCONTINUED | OUTPATIENT
Start: 2018-12-17 | End: 2018-12-20 | Stop reason: HOSPADM

## 2018-12-17 RX ORDER — IBUPROFEN 600 MG/1
600 TABLET ORAL EVERY 6 HOURS PRN
Status: DISCONTINUED | OUTPATIENT
Start: 2018-12-17 | End: 2018-12-20 | Stop reason: HOSPADM

## 2018-12-17 RX ORDER — ONDANSETRON 2 MG/ML
4 INJECTION INTRAMUSCULAR; INTRAVENOUS EVERY 8 HOURS PRN
Status: DISCONTINUED | OUTPATIENT
Start: 2018-12-17 | End: 2018-12-20 | Stop reason: HOSPADM

## 2018-12-17 RX ORDER — SODIUM CHLORIDE, SODIUM LACTATE, POTASSIUM CHLORIDE, CALCIUM CHLORIDE 600; 310; 30; 20 MG/100ML; MG/100ML; MG/100ML; MG/100ML
125 INJECTION, SOLUTION INTRAVENOUS CONTINUOUS
Status: DISCONTINUED | OUTPATIENT
Start: 2018-12-17 | End: 2018-12-20 | Stop reason: HOSPADM

## 2018-12-17 RX ORDER — KETOROLAC TROMETHAMINE 30 MG/ML
30 INJECTION, SOLUTION INTRAMUSCULAR; INTRAVENOUS EVERY 6 HOURS
Status: COMPLETED | OUTPATIENT
Start: 2018-12-17 | End: 2018-12-18

## 2018-12-17 RX ORDER — DOCUSATE SODIUM 100 MG/1
100 CAPSULE, LIQUID FILLED ORAL 2 TIMES DAILY
Status: DISCONTINUED | OUTPATIENT
Start: 2018-12-17 | End: 2018-12-20 | Stop reason: HOSPADM

## 2018-12-17 RX ORDER — BUPIVACAINE HYDROCHLORIDE 7.5 MG/ML
INJECTION, SOLUTION INTRASPINAL AS NEEDED
Status: DISCONTINUED | OUTPATIENT
Start: 2018-12-17 | End: 2018-12-17 | Stop reason: SURG

## 2018-12-17 RX ORDER — ONDANSETRON 2 MG/ML
INJECTION INTRAMUSCULAR; INTRAVENOUS AS NEEDED
Status: DISCONTINUED | OUTPATIENT
Start: 2018-12-17 | End: 2018-12-17 | Stop reason: SURG

## 2018-12-17 RX ORDER — OXYCODONE HYDROCHLORIDE AND ACETAMINOPHEN 5; 325 MG/1; MG/1
1 TABLET ORAL EVERY 4 HOURS PRN
Status: DISCONTINUED | OUTPATIENT
Start: 2018-12-18 | End: 2018-12-20 | Stop reason: HOSPADM

## 2018-12-17 RX ORDER — OXYCODONE HYDROCHLORIDE AND ACETAMINOPHEN 5; 325 MG/1; MG/1
2 TABLET ORAL EVERY 4 HOURS PRN
Status: DISCONTINUED | OUTPATIENT
Start: 2018-12-18 | End: 2018-12-20 | Stop reason: HOSPADM

## 2018-12-17 RX ORDER — BUPIVACAINE HYDROCHLORIDE 7.5 MG/ML
INJECTION, SOLUTION INTRASPINAL
Status: COMPLETED
Start: 2018-12-17 | End: 2018-12-17

## 2018-12-17 RX ORDER — METOCLOPRAMIDE HYDROCHLORIDE 5 MG/ML
5 INJECTION INTRAMUSCULAR; INTRAVENOUS EVERY 6 HOURS PRN
Status: DISPENSED | OUTPATIENT
Start: 2018-12-17 | End: 2018-12-18

## 2018-12-17 RX ORDER — ONDANSETRON 2 MG/ML
4 INJECTION INTRAMUSCULAR; INTRAVENOUS EVERY 4 HOURS PRN
Status: DISPENSED | OUTPATIENT
Start: 2018-12-17 | End: 2018-12-18

## 2018-12-17 RX ORDER — OXYTOCIN/RINGER'S LACTATE 30/500 ML
62.5 PLASTIC BAG, INJECTION (ML) INTRAVENOUS ONCE
Status: COMPLETED | OUTPATIENT
Start: 2018-12-17 | End: 2018-12-17

## 2018-12-17 RX ORDER — SODIUM CHLORIDE 9 MG/ML
125 INJECTION, SOLUTION INTRAVENOUS CONTINUOUS
Status: DISCONTINUED | OUTPATIENT
Start: 2018-12-17 | End: 2018-12-17

## 2018-12-17 RX ORDER — MORPHINE SULFATE 0.5 MG/ML
INJECTION, SOLUTION EPIDURAL; INTRATHECAL; INTRAVENOUS
Status: COMPLETED
Start: 2018-12-17 | End: 2018-12-17

## 2018-12-17 RX ORDER — CALCIUM CARBONATE 200(500)MG
1000 TABLET,CHEWABLE ORAL DAILY PRN
Status: DISCONTINUED | OUTPATIENT
Start: 2018-12-17 | End: 2018-12-20 | Stop reason: HOSPADM

## 2018-12-17 RX ORDER — OXYTOCIN/RINGER'S LACTATE 30/500 ML
PLASTIC BAG, INJECTION (ML) INTRAVENOUS CONTINUOUS PRN
Status: DISCONTINUED | OUTPATIENT
Start: 2018-12-17 | End: 2018-12-17 | Stop reason: SURG

## 2018-12-17 RX ADMIN — METOCLOPRAMIDE 5 MG: 5 INJECTION, SOLUTION INTRAMUSCULAR; INTRAVENOUS at 13:10

## 2018-12-17 RX ADMIN — SODIUM CHLORIDE: 0.9 INJECTION, SOLUTION INTRAVENOUS at 08:49

## 2018-12-17 RX ADMIN — Medication 62.5 MILLI-UNITS/MIN: at 09:49

## 2018-12-17 RX ADMIN — MORPHINE SULFATE 0.2 MG: 0.5 INJECTION, SOLUTION EPIDURAL; INTRATHECAL; INTRAVENOUS at 08:23

## 2018-12-17 RX ADMIN — PROMETHAZINE HYDROCHLORIDE 12.5 MG: 25 INJECTION INTRAMUSCULAR; INTRAVENOUS at 14:54

## 2018-12-17 RX ADMIN — ONDANSETRON 4 MG: 2 INJECTION INTRAMUSCULAR; INTRAVENOUS at 12:11

## 2018-12-17 RX ADMIN — SODIUM CHLORIDE 999 ML/HR: 0.9 INJECTION, SOLUTION INTRAVENOUS at 06:43

## 2018-12-17 RX ADMIN — DEXAMETHASONE SODIUM PHOSPHATE 4 MG: 4 INJECTION, SOLUTION INTRAMUSCULAR; INTRAVENOUS at 11:13

## 2018-12-17 RX ADMIN — DOCUSATE SODIUM 100 MG: 100 CAPSULE, LIQUID FILLED ORAL at 22:16

## 2018-12-17 RX ADMIN — KETOROLAC TROMETHAMINE 30 MG: 30 INJECTION, SOLUTION INTRAMUSCULAR at 09:53

## 2018-12-17 RX ADMIN — SODIUM CHLORIDE, SODIUM LACTATE, POTASSIUM CHLORIDE, AND CALCIUM CHLORIDE 125 ML/HR: .6; .31; .03; .02 INJECTION, SOLUTION INTRAVENOUS at 17:48

## 2018-12-17 RX ADMIN — ONDANSETRON 4 MG: 2 INJECTION INTRAMUSCULAR; INTRAVENOUS at 08:10

## 2018-12-17 RX ADMIN — KETOROLAC TROMETHAMINE 30 MG: 30 INJECTION, SOLUTION INTRAMUSCULAR at 22:16

## 2018-12-17 RX ADMIN — KETOROLAC TROMETHAMINE 30 MG: 30 INJECTION, SOLUTION INTRAMUSCULAR at 16:18

## 2018-12-17 RX ADMIN — Medication 250 MILLI-UNITS/MIN: at 08:44

## 2018-12-17 RX ADMIN — CEFAZOLIN SODIUM 2000 MG: 2 SOLUTION INTRAVENOUS at 08:27

## 2018-12-17 RX ADMIN — BUPIVACAINE HYDROCHLORIDE IN DEXTROSE 1.6 ML: 7.5 INJECTION, SOLUTION SUBARACHNOID at 08:23

## 2018-12-17 RX ADMIN — SODIUM CHLORIDE, SODIUM LACTATE, POTASSIUM CHLORIDE, AND CALCIUM CHLORIDE 125 ML/HR: .6; .31; .03; .02 INJECTION, SOLUTION INTRAVENOUS at 09:53

## 2018-12-17 NOTE — OP NOTE
OPERATIVE REPORT  PATIENT NAME: Gary Reyes    :  1985  MRN: [id number]  Pt Location: [facility] L&D OR ROOM 01    SURGERY DATE: 2018    Surgeon(s) and Role:     * Arpita Gorman DO - Primary     * Markus Canela MD - Assisting    Preop Diagnosis:  Pregnancy at 39w2d  Prior low transverse  section  Request for sterilization    Procedure(s) (LRB):   SECTION () REPEAT (N/A)  LIGATION/COAGULATION TUBAL (Bilateral)    Specimen(s):  ID Type Source Tests Collected by Time Destination   1 :  Tissue Fallopian Tube, Left TISSUE EXAM Laith Lui RN 2018 [id number]    2 :  Tissue Fallopian Tube, Right TISSUE EXAM Laith Lui RN 2018 7670    A :  Cord Blood Cord BLOOD GAS, VENOUS, CORD Laith Lui RN 2018 0844    B :  Tissue (Placenta on Hold) OB Only Placenta PLACENTA IN STORAGE Laith Lui RN 2018 0845    C :  Blood, Arterial Cord BLOOD GAS, ARTERIAL, CORD Laith Lui RN 2018 0844        Estimated Blood Loss:   600 mL    Drains:  Urethral Catheter (Active)   Site Assessment Clean;Skin intact 2018  8:26 AM   Collection Container Standard drainage bag 2018  8:26 AM   Securement Method Securing device (Describe) 2018  8:26 AM   Number of days: 0       Anesthesia Type:   Spinal    Operative Indications:  Repeat low transverse  section  Desires for permanent sterilization     Complications:   None    Procedure and Technique:    Operative Findings:  1  Viable male  at 54 with APGARs of 9 and 9 at 1 and 5 minutes  Fetus weighted 6lb 14oz  2  Normal intact placenta with centrally inserted 3VC expressed at 0846    3  Normal uterus, bilateral tubes and ovaries  4  Blood gases:   Arterial pH: 7 282   Arterial base excess: -1 8   Venous pH: 7 344   Venous base excess: -1 2    Procedure: The patient was taken to the operating room  Spinal was adequately established and ancef was given for preoperative prophylaxis    The patient was then placed in a supine position with a left lateral tilt  Hair catheter was placed in sterile fashion  Fetal heart tones were noted to be normal  The patient was then prepped with chloraprep for abdominal prep and betadine for vaginal prep and draped in the usual sterile fashion for a Pfannensteil skin incision  A time out was performed to confirm correct patient and correct procedure  An incision was made in the skin with a surgical scalpel  Sharp and blunt dissection was used to reach the level of the rectus fascia  Bovie electrocautery was utilitzed for hemostasis during this process  The fascia was incised transversely at the midline and the fascial incision was extended bilaterally using barahona scissors  The superior edge of the fascial incision was grasped with Kocher clamps, tented up and the underlying rectus muscles were dissected off bluntly and sharply using the barahona scissors  The inferior edge of the fascial incision was then dissection off the rectus muscles with blunt and sharp dissection  The rectus muscles were then divided at the midline  The peritoneum was identified, tented up at its upper margin taking care to avoid the bladder, and then entered bluntly  The peritoneal incision and rectus muscle were extended bilaterally bluntly with gentle traction  The bladder blade was inserted  Then, a transverse incision was made in the lower uterine segment using a new surgical blade  The uterine incision was extended cephalad and caudal using blunt dissection  The amniotic sac was entered bluntly and the amniotic fluid was noted to be clear in color  The surgeon's hand was placed into the uterine cavity  The fetus was noted to be in vertex presentation, and the presenting part was grasped and delivered through the uterine incision with the assistance of fundal pressure  The infant's oral and nasal passages were bulb suctioned  After delivery the cord was doubly clamped and cut  The infant was then passed off the table to the awaiting  staff  Venous and arterial blood gas, cord blood, and portion of cord was obtained for analysis and routine blood testing  The placenta then delivered at 0846  The placenta was noted to be intact with a centrally inserted three-vessel cord  Oxytocin was administered by IV infusion to enhance uterine contraction  The uterus was cleared of all clots and debris by blunt curretage with a moist lap sponge  The uterine incision was reapproximated using a 0- vicryl in a running locked fashion in situ  A second horizontal imbricating stitch with 0- vicryl was applied  The uterine incision was examined and noted to be hemostatic  At this point, our attention turned to the adnexa  The bilateral Fallopian tubes were identified  First the right Fallopian tube was identified and grasped with a Marcy Hayden in an avascular area and tented up  Using chronic suture this tube was doubly ligated in a standard modified [place] technique  Good hemostasis was noted from this tube  The left Fallopian tube was identified and doubly ligated in the same manner  Good hemostasis was noted on this side  At this point, the posterior cul-de-sac was cleared of all clots  The uterine incision and bilateral tubal ligation sites were once again reexamined and noted to be hemostatic  The fascia was then reapproximated using 0 Vicryl in a running nonlocked fashion  The subcutaneous tissue was irrigated and cleared of all clots and debris  Good hemostasis was achieved with Bovie electrocautery  The subcutaneous tissue was not reapproximated  The skin incision was closed with 4-0 monocryl  Good hemostasis was noted  Histocryl was placed on top of the skin incision in a sterile fashion as a surgical dressing  All needle, sponge, and instrument counts were noted to be correct x 2 at the end of the procedure      The patient was then cleansed and transferred to the recovery room     Overall, the patient tolerated the procedure well and is currently in stable condition in the PACU with her   Dr Olivia Flanagan was present for the entire procedure        Patient Disposition:  PACU     SIGNATURE: Moo Coleman MD  DATE: 2018  TIME: 9:31 AM

## 2018-12-17 NOTE — DISCHARGE SUMMARY
Discharge Summary - Ana Barth 26 y o  female MRN: [id number]    Unit/Bed#: L&D 021-22 Encounter: [id number]    Admission Date: 2018     Discharge Date: 2018    Admitting Attending: Dr Lucas Galvez  Delivering Attending: Dr Lucas Galvez  Discharge Attending: Sheryl Mayorga     Diagnosis:  Pregnancy at 39w2d  Desire for repeat low transverse  section  Desire for permanent sterilization  Hypothyroidism  GERD    Procedures: Repeat low transverse  section     Complications: none apparent     Pt is a 27yo  who was admitted for repeat low transverse  section and bilateral tubal ligation at 39w2d  She underwent an uncomplicated  delivery  She delivered a viable male  at 56 on 18  Weight was 6lbs 14oz with APGARs of 9 and 9 at 1 and 5 minutes  Her preoperative Hb was 12 6  Her postoperative Hb was 10 5  Her postoperative course was uncomplicated  Condition at discharge: good     On day of discharge pain was well controlled, patient was tolerating PO and had appropriate bowel function  She was discharged with standard post partum/ post operative instructions to follow up with her physician in 1 week for an incision check and in 3-6 weeks for a postpartum appointment  Discharge instructions/Information to patient and family:   -Do not place anything (no partner, tampons or douche) in your vagina for 6 weeks    -You may walk for exercise for the first 6 weeks then gradually return to your usual activities    -Please do not drive for 1 week if you have no stitches and for 2 weeks if you have stitches or underwent a  delivery     -You may take baths or shower per your preference    -Please look at your bust (breasts) in the mirror daily and call for redness or tenderness or increased warmth    -Please call us for temperature > 100 4*F or 38* C, worsening pain or a foul discharge       Discharge Medications:   Prenatal vitamin daily for 6 months or the duration of nursing whichever is longer  Motrin 600 mg orally every 6 hours as needed for pain  Tylenol (over the counter) per bottle directions as needed for pain: do NOT use with percocet  Hydrocortisone cream 1% (over the counter) applied 1-2x daily to hemorrhoids as needed  Percocet as needed    Provisions for Follow-Up Care: Follow up with your doctor in 1 week for incision site check       Planned Readmission: No

## 2018-12-17 NOTE — ANESTHESIA PROCEDURE NOTES
Spinal Block    Patient location during procedure: OB  Start time: 12/17/2018 8:23 AM  Reason for block: at surgeon's request and primary anesthetic  Staffing  Anesthesiologist: Joshua Longoria  Performed: anesthesiologist   Preanesthetic Checklist  Completed: patient identified, site marked, surgical consent, pre-op evaluation, timeout performed, IV checked, risks and benefits discussed and monitors and equipment checked  Spinal Block  Patient position: sitting  Prep: Betadine  Patient monitoring: continuous pulse ox, frequent blood pressure checks and heart rate  Approach: midline  Location: L3-4  Injection technique: single-shot  Needle  Needle type: pencil-tip   Needle gauge: 24 G  Needle length: 10 cm  Assessment  Sensory level: T4  Injection Assessment:  negative aspiration for heme, no paresthesia on injection and positive aspiration for clear CSF    Post-procedure:  site cleaned

## 2018-12-17 NOTE — H&P
History & Physical - OB/GYN   Jessica Josue 26 y o  female MRN: [id number]  Unit/Bed#: L&D 329-02 Encounter: [id number]    26 y o   at 39w2d by LMP    She is a patient of [facility]    Chief complaint:  Scheduled Repeat  Section and Bilateral Tubal Ligation    HPI:  Contractions:  no  Fetal movement:  yes  Vaginal bleeding:   no  Leaking of fluid:  no      Pregnancy Complications:  Patient Active Problem List   Diagnosis    Hypothyroidism    Gastroenteritis    GERD (gastroesophageal reflux disease)    Ganglion cyst of volar aspect of left wrist    Nausea    Encounter for  delivery without indication   [id number] metformin     PMH:  Past Medical History:   Diagnosis Date    Gastroenteritis     last assessed: 16    Hypothyroidism 2018    Plantar fasciitis, right     last assessed: 04/16/15    PONV (postoperative nausea and vomiting)     Seizures ([facility] [address] )     last assessed: 08/15/17    Thyroid disease     last assessed: 08/15/17    Viral gastroenteritis     last assessed: 17       PSH:  Past Surgical History:   Procedure Laterality Date     SECTION      MOUTH SURGERY Bilateral 2000    MN EXCIS PRIMARY GANGLION WRIST Left 3/15/2018    Procedure: VOLAR WRIST GANGLION CYST EXCISION;  Surgeon: Tenisha Andre MD;  Location: [facility] OR;  Service: Orthopedics    WISDOM TOOTH EXTRACTION         Social Hx:  Denies x 3     OB Hx:  Obstetric History       T0      L0     SAB0   TAB0   Ectopic0   Multiple0   Live Births0       # Outcome Date GA Lbr Maurice/2nd Weight Sex Delivery Anes PTL Lv   1 Current                   Meds:  No current facility-administered medications on file prior to encounter        Current Outpatient Prescriptions on File Prior to Encounter   Medication Sig Dispense Refill    levothyroxine 75 mcg tablet Take 1 tablet (75 mcg total) by mouth daily 90 tablet 1    ondansetron (ZOFRAN-ODT) 4 mg disintegrating tablet Take 2 tablets every 8 hours by oral route for 15 days   Prenatal MV-Min-Fe Fum-FA-DHA (PRENATAL 1 PO) Prenatal         Allergies: Allergies   Allergen Reactions    Seasonal Ic [Cholestatin] Itching     Itchy eyes, congestion       Labs:  Blood type: O+  Antibody: negative  Group B strep: negative  HIV: negative  Hepatitis B: negative  RPR: unknown  Rubella: Immune  Varicella Immune      Physical Exam:  LMP 2018     Physical Exam   Constitutional: She is oriented to person, place, and time  She appears well-developed and well-nourished  No distress  Cardiovascular: Normal rate, regular rhythm, normal heart sounds and intact distal pulses  Pulmonary/Chest: Effort normal and breath sounds normal  No respiratory distress  She has no wheezes  Abdominal: Soft  Bowel sounds are normal  She exhibits no distension  There is no tenderness  Neurological: She is alert and oriented to person, place, and time  Skin: She is not diaphoretic  Estimated Fetal Weight: 8 lbs  Presentation: vertex    SVE: deferred  FHT:  110 / Moderate 6 - 25 bpm / + accelerations, no decelerations  [place]: quiet     Membranes: intact     Assessment:   26 y o   at 39w2d by LMP    Plan:   1  Admit to L&D  2  CBC, RPR, type and screen  3  Ancef 2g IV for preoperative prophylaxis  4  FEN: NPO, NS IVF  5   Anesthesia consult     Discussed with Dr Noemi Polanco DO

## 2018-12-17 NOTE — LACTATION NOTE
This note was copied from a baby's chart  Met with mother  Provided mother with Ready, Set, Baby booklet  Discussed Skin to Skin contact an benefits to mom and baby  Talked about the delay of the first bath until baby has adjusted  Spoke about the benefits of rooming in  Feeding on cue and what that means for recognizing infant's hunger  Avoidance of pacifiers for the first month discussed  Talked about exclusive breastfeeding for the first 6 months  Positioning and latch reviewed as well as showing images of other feeding positions  Discussed the properties of a good latch in any position  Reviewed hand/manual expression  Discussed s/s that baby is getting enough milk and some s/s that breastfeeding dyad may need further help  Gave information on common concerns, what to expect the first few weeks after delivery, preparing for other caregivers, and how partners can help  Resources for support also provided  Baby was latched when I entered room, for his first feeding  He was latched well   Enc mom to call for assistance as needed,phone # provided

## 2018-12-17 NOTE — ANESTHESIA PREPROCEDURE EVALUATION
Review of Systems/Medical History  Patient summary reviewed    History of anesthetic complications PONV    Cardiovascular  Negative cardio ROS Exercise tolerance (METS): >4,     Pulmonary  Negative pulmonary ROS        GI/Hepatic    GERD well controlled,             Endo/Other  History of thyroid disease , hypothyroidism,      GYN  Currently pregnant ,          Hematology  Negative hematology ROS      Musculoskeletal  Negative musculoskeletal ROS        Neurology  Seizures (last 8 yrs ago, not on medication) well controlled,     Psychology   Negative psychology ROS              Physical Exam    Airway    Mallampati score: II  TM Distance: >3 FB  Neck ROM: full     Dental   No notable dental hx     Cardiovascular  Comment: Negative ROS, Rhythm: regular, Rate: normal,     Pulmonary  Breath sounds clear to auscultation,     Other Findings        Anesthesia Plan  ASA Score- 2     Anesthesia Type- spinal with ASA Monitors  Additional Monitors:   Airway Plan:         Plan Factors-Patient not instructed to abstain from smoking on day of procedure  Patient did not smoke on day of surgery  Induction-     Postoperative Plan-     Informed Consent- Anesthetic plan and risks discussed with patient

## 2018-12-17 NOTE — PROGRESS NOTES
Progress Note - OB/GYN  Adrián Burrell 26 y o  female MRN: [id number]  Unit/Bed#: L&D 310-01 Encounter: [id number]      Subjective:  "I feel much better"    Patient had significant nausea and vomiting immediately postoperatively that is now resolved  Pain: well controlled  Tolerating PO: no  Voiding: Hair in place  Flatus: denies  BM: denies  Ambulating: not yet  Breastfeeding:  yes  Chest pain: denies  Shortness of breath: denies  Leg pain: denies  Lochia: minimal    Vitals:   /76   Pulse 82   Temp 97 7 °F (36 5 °C) (Oral)   Resp 16   Ht 5' 3" (1 6 m)   Wt 85 3 kg (188 lb)   LMP 02/09/2018   SpO2 95%   Breastfeeding?  Yes   BMI 33 30 kg/m²       Intake/Output Summary (Last 24 hours) at 12/17/18 1527  Last data filed at 12/17/18 1210   Gross per 24 hour   Intake             1500 ml   Output             1200 ml   Net              300 ml       Invasive Devices     Peripheral Intravenous Line            Peripheral IV 12/17/18 Left Hand less than 1 day          Drain            Urethral Catheter less than 1 day                Physical Exam:   GEN: Adrián Burrell appears well, alert and oriented x 3, pleasant and cooperative   ABDOMEN: soft, no tenderness, no distention, fundus @ -3 below the umbilicus, Incision C/D/I with histacryl in place  EXTREMITIES: SCDs on and on      Labs:   Admission on 12/17/2018   Component Date Value    ABO Grouping 12/17/2018 O     Rh Factor 12/17/2018 Positive     Antibody Screen 12/17/2018 Negative     Specimen Expiration Date 12/17/2018 [id number]     WBC 12/17/2018 8 62     RBC 12/17/2018 4 16     Hemoglobin 12/17/2018 12 6     Hematocrit 12/17/2018 38 5     MCV 12/17/2018 93     MCH 12/17/2018 30 3     MCHC 12/17/2018 32 7     RDW 12/17/2018 13 1     MPV 12/17/2018 11 0     Platelets 57/02/1838 166     nRBC 12/17/2018 0     Neutrophils Relative 12/17/2018 63     Immat GRANS % 12/17/2018 1     Lymphocytes Relative 12/17/2018 28     Monocytes Relative 2018 7     Eosinophils Relative 2018 1     Basophils Relative 2018 0     Neutrophils Absolute 2018 5 49     Immature Grans Absolute 2018 0 04     Lymphocytes Absolute 2018 2 44     Monocytes Absolute 2018 0 56     Eosinophils Absolute 2018 0 06     Basophils Absolute 2018 0 03     RPR 2018 Non-Reactive     POC Glucose 2018 76     pH, Cord Jayden 2018 7 344     pCO2, Cord Jayden 2018 46 9*    pO2, Cord Jayden 2018 24 0     HCO3, Cord Jayden 2018 25 0     Base Exc, Cord Jayden 2018 -1 2*    O2 Cont, Cord Jayden 2018 13 5     O2 HGB,VENOUS CORD 2018 58 6     pH, Cord Art 2018 7 282     pCO2, Cord Art 2018 56 8     pO2, Cord Art 2018 16 7     HCO3, Cord Art 2018 26 2     Base Exc, Cord Art 2018 -1 8*    O2 Content, Cord Art 2018 8 1     O2 Hgb, Arterial Cord 2018 33 5          Patient Active Problem List   Diagnosis    Hypothyroidism    Gastroenteritis    GERD (gastroesophageal reflux disease)    Ganglion cyst of volar aspect of left wrist    Nausea    S/P repeat low transverse     Mild dysplasia of cervix ([facility])    Seizure disorder ([facility] [address] )    S/P tubal ligation        Assessment:  Postop Day #0 s/p RLTCS+BTL, stable    Plan:  1) Hypothyroidism   Reinitiate levothyroxine in the AM  2) Postoperative care   Hgb 12 6g/dL --> pending   Urinary output 600, le in until AM   Encourage ambulation   Encourage breastfeeding   Anticipate discharge Jones Arauz MD  2018  3:27 PM

## 2018-12-17 NOTE — LACTATION NOTE
This note was copied from a baby's chart  Assisted mom with breastfeeding  Baby sleepy  We attempted for 15-20 minutes to latch and he would not, but mom hand expressed about 3 ml colostrum to him as we were attempting

## 2018-12-17 NOTE — ADDENDUM NOTE
Addendum  created 12/17/18 1429 by Niharika Alejandra, DO    Order list changed, Order sets accessed

## 2018-12-18 LAB
ERYTHROCYTE [DISTWIDTH] IN BLOOD BY AUTOMATED COUNT: 13.2 % (ref 11.6–15.1)
HCT VFR BLD AUTO: 33.3 % (ref 34.8–46.1)
HGB BLD-MCNC: 10.5 G/DL (ref 11.5–15.4)
MCH RBC QN AUTO: 29.7 PG (ref 26.8–34.3)
MCHC RBC AUTO-ENTMCNC: 31.5 G/DL (ref 31.4–37.4)
MCV RBC AUTO: 94 FL (ref 82–98)
PLATELET # BLD AUTO: 170 THOUSANDS/UL (ref 149–390)
PMV BLD AUTO: 11 FL (ref 8.9–12.7)
RBC # BLD AUTO: 3.53 MILLION/UL (ref 3.81–5.12)
WBC # BLD AUTO: 10.52 THOUSAND/UL (ref 4.31–10.16)

## 2018-12-18 PROCEDURE — 85027 COMPLETE CBC AUTOMATED: CPT | Performed by: OBSTETRICS & GYNECOLOGY

## 2018-12-18 RX ORDER — DIPHENHYDRAMINE HCL 25 MG
25 TABLET ORAL EVERY 6 HOURS PRN
Status: DISCONTINUED | OUTPATIENT
Start: 2018-12-18 | End: 2018-12-20 | Stop reason: HOSPADM

## 2018-12-18 RX ADMIN — IBUPROFEN 600 MG: 600 TABLET, FILM COATED ORAL at 18:01

## 2018-12-18 RX ADMIN — LEVOTHYROXINE SODIUM 75 MCG: 25 TABLET ORAL at 06:06

## 2018-12-18 RX ADMIN — SODIUM CHLORIDE, SODIUM LACTATE, POTASSIUM CHLORIDE, AND CALCIUM CHLORIDE 125 ML/HR: .6; .31; .03; .02 INJECTION, SOLUTION INTRAVENOUS at 01:53

## 2018-12-18 RX ADMIN — DOCUSATE SODIUM 100 MG: 100 CAPSULE, LIQUID FILLED ORAL at 08:05

## 2018-12-18 RX ADMIN — IBUPROFEN 600 MG: 600 TABLET, FILM COATED ORAL at 11:02

## 2018-12-18 RX ADMIN — DOCUSATE SODIUM 100 MG: 100 CAPSULE, LIQUID FILLED ORAL at 18:01

## 2018-12-18 RX ADMIN — KETOROLAC TROMETHAMINE 30 MG: 30 INJECTION, SOLUTION INTRAMUSCULAR at 04:09

## 2018-12-18 NOTE — PLAN OF CARE
Problem: BIRTH - VAGINAL/ SECTION  Goal: Fetal and maternal status remain reassuring during the birth process  INTERVENTIONS:  - Monitor vital signs  - Monitor fetal heart rate  - Monitor uterine activity  - Monitor labor progression (vaginal delivery)  - DVT prophylaxis  - Antibiotic prophylaxis   Outcome: Completed Date Met: 18    Goal: Emotionally satisfying birthing experience for mother/fetus  Interventions:  - Assess, plan, implement and evaluate the nursing care given to the patient in labor  - Advocate the philosophy that each childbirth experience is a unique experience and support the family's chosen level of involvement and control during the labor process   - Actively participate in both the patient's and family's teaching of the birth process  - Consider cultural, Taoist and age-specific factors and plan care for the patient in labor   Outcome: Completed Date Met: 18      Problem: POSTPARTUM  Goal: Experiences normal postpartum course  INTERVENTIONS:  - Monitor maternal vital signs  - Assess uterine involution and lochia   Outcome: Progressing    Goal: Appropriate maternal -  bonding  INTERVENTIONS:  - Identify family support  - Assess for appropriate maternal/infant bonding   -Encourage maternal/infant bonding opportunities  - Referral to  or  as needed   Outcome: Progressing    Goal: Establishment of infant feeding pattern  INTERVENTIONS:  - Assess breast/bottle feeding  - Refer to lactation as needed   Outcome: Progressing    Goal: Incision(s), wounds(s) or drain site(s) healing without S/S of infection  INTERVENTIONS  - Assess and document risk factors for skin impairment   - Assess and document dressing, incision, wound bed, drain sites and surrounding tissue  - Initiate Nutrition services consult and/or wound management as needed   Outcome: Progressing

## 2018-12-18 NOTE — LACTATION NOTE
This note was copied from a baby's chart  Spoke with mom about breastfeeding and she verb it is going fairly well, "when he wants to do it"  She requested a latch assist  Baby had been latching well yesterday   I gave her a latch assist, but enc her to call for assistance as needed,phone # given

## 2018-12-18 NOTE — PROGRESS NOTES
Progress Note - OB/GYN   [facility] 26 y o  female MRN: [id number]  Unit/Bed#: L&D 310-01 Encounter: [id number]    Assessment:  Post operative Day #1 s/p RLTCS + BTL, stable, baby in room    Plan:  1) Hypothyroidism   Levothyroxine 75 mcg q AM  2) Continue routine post partum care   Encourage ambulation   Encourage breastfeeding   Anticipate discharge POD 3     Subjective/Objective   Chief Complaint:     Post delivery  Patient is doing well  Lochia WNL  Pain well controlled  Subjective:     Pain: yes, cramping, improved with meds  Tolerating PO: yes  Voiding: yes  Flatus: yes  BM: no  Ambulating: yes  Breastfeeding:  yes  Chest pain: no  Shortness of breath: no  Leg pain: no  Lochia: minimal    Objective:     Vitals: /66 (BP Location: Right arm)   Pulse 70   Temp 99 3 °F (37 4 °C) (Oral)   Resp 17   Ht 5' 3" (1 6 m)   Wt 85 3 kg (188 lb)   LMP 02/09/2018   SpO2 97%   Breastfeeding? Yes   BMI 33 30 kg/m²       Intake/Output Summary (Last 24 hours) at 12/18/18 0628  Last data filed at 12/18/18 0555   Gross per 24 hour   Intake             2500 ml   Output             2803 ml   Net             -303 ml       Lab Results   Component Value Date    WBC 10 52 (H) 12/18/2018    HGB 10 5 (L) 12/18/2018    HCT 33 3 (L) 12/18/2018    MCV 94 12/18/2018     12/18/2018       Physical Exam:     Gen: AAOx3, NAD  CV: RRR  Lungs: CTA b/l  Abd: Soft, non-tender, non-distended, no rebound or guarding, incision clean/dry/intact with histacryl in place  Large ecchymosis anterior to incision  Uterine fundus firm and non-tender, 3 cm below the umbilicus     Ext: Non tender    Theodore Sheridan MD  12/18/2018  6:28 AM

## 2018-12-19 RX ADMIN — DOCUSATE SODIUM 100 MG: 100 CAPSULE, LIQUID FILLED ORAL at 17:15

## 2018-12-19 RX ADMIN — DOCUSATE SODIUM 100 MG: 100 CAPSULE, LIQUID FILLED ORAL at 08:05

## 2018-12-19 RX ADMIN — IBUPROFEN 600 MG: 600 TABLET, FILM COATED ORAL at 14:01

## 2018-12-19 RX ADMIN — LEVOTHYROXINE SODIUM 75 MCG: 25 TABLET ORAL at 06:32

## 2018-12-19 RX ADMIN — IBUPROFEN 600 MG: 600 TABLET, FILM COATED ORAL at 06:32

## 2018-12-19 RX ADMIN — IBUPROFEN 600 MG: 600 TABLET, FILM COATED ORAL at 20:01

## 2018-12-19 NOTE — PROGRESS NOTES
Progress Note - OB/GYN   Janeen Sever 26 y o  female MRN: [id number]  Unit/Bed#: L&D 310-01 Encounter: [id number]    Assessment:  Post partum Day #2 s/p rLTCS + BTL , stable, baby in room    Plan:  1) Post op    - EBL 600cc, Hgb 12 6 -> 10 5  2) Continue routine post partum care   Encourage ambulation   Encourage breastfeeding   Anticipate discharge on POD#3     Subjective/Objective   Chief Complaint:     Post delivery  Patient is doing well  Lochia WNL  Pain well controlled  Subjective:     Pain: yes, cramping, improved with meds  Tolerating PO: yes  Voiding: yes  Flatus: yes  BM: no  Ambulating: yes  Breastfeeding:  yes  Chest pain: no  Shortness of breath: no  Leg pain: no  Lochia: WNL    Objective:     Vitals: /78 (BP Location: Right arm)   Pulse 68   Temp 98 2 °F (36 8 °C) (Oral)   Resp 12   Ht 5' 3" (1 6 m)   Wt 85 3 kg (188 lb)   LMP 02/09/2018   SpO2 97%   Breastfeeding? Yes   BMI 33 30 kg/m²       Intake/Output Summary (Last 24 hours) at 12/19/18 0607  Last data filed at 12/18/18 1401   Gross per 24 hour   Intake                0 ml   Output              500 ml   Net             -500 ml       Lab Results   Component Value Date    WBC 10 52 (H) 12/18/2018    HGB 10 5 (L) 12/18/2018    HCT 33 3 (L) 12/18/2018    MCV 94 12/18/2018     12/18/2018       Physical Exam:     Gen: AAOx3, NAD  CV: RRR  Lungs: CTA b/l  Abd: Soft, non-tender, non-distended, no rebound or guarding  Uterine fundus firm and non-tender, 2cm below the umbilicus     Ext: Non tender    Incision: c/d/i; large ecchymosis above incision      Pablo Huston MD  OB/GYN PGY-1  12/19/2018  6:07 AM

## 2018-12-19 NOTE — LACTATION NOTE
This note was copied from a baby's chart  Mother verbalized breastfeeding is going well  Does admit to nipples being sore and requested nipple cream  Verbally reviewed proper deep latch and positioning  Advised mom to call with next feed to help with that process  Enc to call for assistance as needed,phone # given

## 2018-12-20 VITALS
HEIGHT: 63 IN | TEMPERATURE: 98.3 F | BODY MASS INDEX: 33.31 KG/M2 | WEIGHT: 188 LBS | HEART RATE: 72 BPM | DIASTOLIC BLOOD PRESSURE: 85 MMHG | SYSTOLIC BLOOD PRESSURE: 122 MMHG | OXYGEN SATURATION: 97 % | RESPIRATION RATE: 18 BRPM

## 2018-12-20 RX ORDER — ACETAMINOPHEN 325 MG/1
650 TABLET ORAL EVERY 4 HOURS PRN
Qty: 30 TABLET | Refills: 0
Start: 2018-12-20 | End: 2020-09-28

## 2018-12-20 RX ORDER — IBUPROFEN 200 MG
600 TABLET ORAL EVERY 6 HOURS PRN
Start: 2018-12-20 | End: 2020-09-28

## 2018-12-20 RX ORDER — DOCUSATE SODIUM 100 MG/1
100 CAPSULE, LIQUID FILLED ORAL 2 TIMES DAILY
Qty: 10 CAPSULE | Refills: 0
Start: 2018-12-20 | End: 2020-09-28

## 2018-12-20 RX ORDER — CALCIUM CARBONATE 200(500)MG
1000 TABLET,CHEWABLE ORAL DAILY PRN
Refills: 0
Start: 2018-12-20 | End: 2020-09-28

## 2018-12-20 RX ADMIN — IBUPROFEN 600 MG: 600 TABLET, FILM COATED ORAL at 02:09

## 2018-12-20 RX ADMIN — DOCUSATE SODIUM 100 MG: 100 CAPSULE, LIQUID FILLED ORAL at 07:57

## 2018-12-20 RX ADMIN — LEVOTHYROXINE SODIUM 75 MCG: 25 TABLET ORAL at 05:57

## 2018-12-20 RX ADMIN — IBUPROFEN 600 MG: 600 TABLET, FILM COATED ORAL at 07:57

## 2018-12-20 NOTE — DISCHARGE INSTRUCTIONS
WHAT YOU NEED TO KNOW:   A , or  section, is abdominal surgery to deliver your baby  DISCHARGE INSTRUCTIONS:   Call 911 for any of the following:   · You feel lightheaded, short of breath, and have chest pain  · You cough up blood  Seek care immediately if:   · Blood soaks through your bandage  · Your stitches come apart  · Your arm or leg feels warm, tender, and painful  It may look swollen and red  Contact your OB if:   · You have heavy vaginal bleeding that fills 1 or more sanitary pads in 1 hour  · You have a fever  · Your incision is swollen, red, or draining pus  · You have questions or concerns about yourself or your baby  Medicines: You may  need any of the following:  · Prescription pain medicine  may be given  Ask how to take this medicine safely  · Acetaminophen  decreases pain and fever  It is available without a doctor's order  Ask how much to take and how often to take it  Follow directions  Acetaminophen can cause liver damage if not taken correctly  · NSAIDs , such as ibuprofen, help decrease swelling, pain, and fever  NSAIDs can cause stomach bleeding or kidney problems in certain people  If you take blood thinner medicine, always ask your healthcare provider if NSAIDs are safe for you  Always read the medicine label and follow directions  · Take your medicine as directed  Contact your healthcare provider if you think your medicine is not helping or if you have side effects  Tell him or her if you are allergic to any medicine  Keep a list of the medicines, vitamins, and herbs you take  Include the amounts, and when and why you take them  Bring the list or the pill bottles to follow-up visits  Carry your medicine list with you in case of an emergency  Wound care:  Carefully wash your wound with soap and water every day  Keep your wound clean and dry  Wear loose, comfortable clothes that do not rub against your wound   Ask your OB about bathing and showering  Limit activity as directed:   · Ask when it is safe for you to drive, walk up stairs, lift heavy objects, and have sex  · Ask when it is okay to exercise, and what types of exercise to do  Start slowly and do more as you get stronger  Drink liquids as directed:  Liquids help keep you hydrated after your procedure and decrease your risk for a blood clot  Ask how much liquid to drink each day and which liquids are best for you  Follow up with your OB as directed: You may need to return to have your stitches or staples removed  Write down your questions so you remember to ask them during your visits  © 2017 [address] Information is for End User's use only and may not be sold, redistributed or otherwise used for commercial purposes  All illustrations and images included in CareNotes® are the copyrighted property of A D A M , Inc  or [employer]ven[employer] Ar[employer]  The above information is an  only  It is not intended as medical advice for individual conditions or treatments  Talk to your doctor, nurse or pharmacist before following any medical regimen to see if it is safe and effective for you   Section   WHAT YOU SHOULD KNOW:   A  delivery, or , is abdominal surgery to deliver your baby  There are many reasons you may need a   · A  may be scheduled before labor if you had a  with your last baby  It may be scheduled if your baby is not positioned normally, or you are pregnant with more than 1 baby  · Your caregiver may perform an emergency  during labor to prevent life-threatening complications for you or your baby  A  may be done if your cervix does not dilate after several hours of active labor  · Other reasons for a  include maternal infections and problems with the placenta  AFTER YOU LEAVE:   Medicines:   · Prescription pain medicine  may be given   Ask how to take this medicine safely  · Acetaminophen  decreases pain and fever  It is available without a doctor's order  Ask how much to take and how often to take it  Follow directions  Acetaminophen can cause liver damage if not taken correctly  · NSAIDs  help decrease swelling and pain or fever  This medicine is available with or without a doctor's order  NSAIDs can cause stomach bleeding or kidney problems in certain people  If you take blood thinner medicine, always ask your obstetrician if NSAIDs are safe for you  Always read the medicine label and follow directions  · Take your medicine as directed  Contact your obstetrician (OB) if you think your medicine is not helping or if you have side effects  Tell him if you are allergic to any medicine  Keep a list of the medicines, vitamins, and herbs you take  Include the amounts, and when and why you take them  Bring the list or the pill bottles to follow-up visits  Carry your medicine list with you in case of an emergency  Follow up with your OB as directed: You may need to return to have your stitches or staples removed  Write down your questions so you remember to ask them during your visits  Wound care:  Carefully wash your wound with soap and water every day  Keep your wound clean and dry  Wear loose, comfortable clothes that do not rub against your wound  Ask your OB about bathing and showering  Drink plenty of liquids: You can lower your risk for a blood clot if you drink plenty of liquids  Ask how much liquid to drink each day and which liquids are best for you  Limit activity until you have fully recovered from surgery:   · Ask when it is safe for you to drive, walk up stairs, lift heavy objects, and have sex  · Ask when it is okay to exercise, and what types of exercise to do  Start slowly and do more as you get stronger  Contact your OB if:   · You have heavy vaginal bleeding that fills 1 or more sanitary pads in 1 hour      · You have a fever      · Your incision is swollen, red, or draining pus  · You have questions or concerns about yourself or your baby  Seek care immediately or call 911 if:   · Blood soaks through your bandage  · Your stitches come apart  · You feel lightheaded, short of breath, and have chest pain  · You cough up blood  · Your arm or leg feels warm, tender, and painful  It may look swollen and red  © 2014 [address] is for End User's use only and may not be sold, redistributed or otherwise used for commercial purposes  All illustrations and images included in CareNotes® are the copyrighted property of A D A M , Inc  or Juvenal Arriaga  The above information is an  only  It is not intended as medical advice for individual conditions or treatments  Talk to your doctor, nurse or pharmacist before following any medical regimen to see if it is safe and effective for you

## 2018-12-20 NOTE — PROGRESS NOTES
Progress Note - OB/GYN   Pita Henderson 26 y o  female MRN: [id number]  Unit/Bed#: L&D 310-01 Encounter: [id number]    Assessment:  Post partum Day #3 s/p rLTCS + BTL , stable, baby in room    Plan:  1) Post op    - EBL 600cc, Hgb 12 6 -> 10 5  2) Continue routine post partum care   Encourage ambulation   Encourage breastfeeding   Anticipate discharge today    Subjective/Objective   Chief Complaint:     Post delivery  Patient is doing well  Lochia WNL  Pain well controlled  Subjective:     Pain: yes, cramping, improved with meds  Tolerating PO: yes  Voiding: yes  Flatus: yes  BM: no  Ambulating: yes  Breastfeeding:  yes  Chest pain: no  Shortness of breath: no  Leg pain: no  Lochia: WNL    Objective:     Vitals: /83 (BP Location: Right arm)   Pulse 82   Temp 98 1 °F (36 7 °C) (Oral)   Resp 18   Ht 5' 3" (1 6 m)   Wt 85 3 kg (188 lb)   LMP 02/09/2018   SpO2 98%   Breastfeeding? Yes   BMI 33 30 kg/m²     No intake or output data in the 24 hours ending 12/20/18 0649    Lab Results   Component Value Date    WBC 10 52 (H) 12/18/2018    HGB 10 5 (L) 12/18/2018    HCT 33 3 (L) 12/18/2018    MCV 94 12/18/2018     12/18/2018       Physical Exam:     Gen: AAOx3, NAD  CV: RRR  Lungs: CTA b/l  Abd: Soft, non-tender, non-distended, no rebound or guarding  Uterine fundus firm and non-tender, 2cm below the umbilicus     Ext: Non tender    Incision: c/d/i; large ecchymosis above incision, stable since yesterday      Israel French MD  OB/GYN PGY-1  12/20/2018  6:49 AM

## 2018-12-20 NOTE — LACTATION NOTE
This note was copied from a baby's chart  Met with mother to go over discharge breastfeeding booklet including the feeding log since birth for the first week  Emphasized 8 or more (12) feedings in a 24 hour period, what to expect for the number of diapers per day of life and the progression of properties of the  stooling pattern  Discussed s/s that breastfeeding is going well after day 4 and when to get help from a pediatrician or lactation support person after day 4  Booklet included Breast Pumping Instructions, When You Go Back to Work or School, and Breastfeeding Resources for after discharge including access to the number for the [employer]  Mother verbalized breastfeeding is going well  Enc to call for assistance as needed,phone # given

## 2018-12-25 LAB — PLACENTA IN STORAGE: NORMAL

## 2019-07-30 NOTE — LETTER
Consultation - Cardiology   Ameena Gee 77 y o  female MRN: [id number]  Unit/Bed#: [facility] 306-01 Encounter: [id number]    Assessment/Plan     Assessment:  1  Fall with question of syncope  2  Acute renal failure on chronic kidney disease stage 3  Baseline creatinine is 1 3-1 4  3  Diabetes with hemoglobin A1c of 8 4  4  SVT/PAF  4  Morbid obesity     Plan:  Patient has been admitted to the hospitalist service  1  Orthostatic vital signs have been negative since admission to the hospital   Continue monitor  2  Continue telemetry to rule out underlying arrhythmia  3  Agree with holding patient's Ace and diuretic at this time  Continue gentle IV rehydration  4  Diabetes managed per the primary team     History of Present Illness   Physician Requesting Consult: Bethany Umana MD  Reason for Consult / Principal Problem:  Possible syncopal episode    HPI: Ameena Gee is a 77y o  year old female who presented to the emergency room after experiencing a fall at home  She states she was watching her grand nieces and was putting the 8month-old down on the floor  She states she leaned forward and felt slightly lightheaded and dizzy  She states she felt herself falling to the one side and landed on the floor  She is unclear whether she lost consciousness  She did not experience any injury  Due to bad knees, she was unable to get herself up off the floor  She was able to call for help and was brought to the emergency room for evaluation  Patient does note that she was scheduled for a colonoscopy today at an outpatient center in [facility]  She had been on clear liquids in preparation for the procedure  She had not started the laxative prep yet that day  She did note that earlier in the day she was not feeling well and a friend came to sit with her for awhile  After this person left is when this incident occurred  On presentation patient was noted to have a BUN of 65 with a creatinine of 3 53  May 4, 2018     Patient: Lesli Osborne   YOB: 1985   Date of Visit: 5/4/2018       To Whom it May Concern:    Stacia Fregoso is under my professional care  She was seen in my office on 5/4/2018  She may return to work on 5/7/18  If you have any questions or concerns, please don't hesitate to call           Sincerely,          HARSHA Beltrán        CC: No Recipients Patient's baseline creatinine tends to be between 1 2-1 3  Patient denies taking extra diuretics, she denies any recent diarrhea or nausea or vomiting  She states she has been in her usual state of health prior to yesterday  Patient does have a history of chronic diastolic heart failure with lower extremity edema  She notes her weights have been stable at home on Demadex 20 mg daily  She was also taking ACE-inhibitor at home  Patient echocardiogram performed in May of 2019 which demonstrated ejection fraction 55% with grade 1 diastolic dysfunction, patient had mild mitral valve and tricuspid valve regurgitation  Inpatient consult to Cardiology  Consult performed by: RK Jensen  Consult ordered by: Alice Mcgregor MD          Review of Systems   Constitutional: Negative  Negative for activity change, fatigue and fever  HENT: Negative  Negative for congestion, sinus pain, tinnitus and voice change  Eyes: Negative  Negative for photophobia and visual disturbance  Respiratory: Negative for cough and shortness of breath  Cardiovascular: Negative  Negative for chest pain, palpitations and leg swelling  Gastrointestinal: Negative  Endocrine: Negative  Negative for polydipsia, polyphagia and polyuria  Genitourinary: Negative  Musculoskeletal: Positive for gait problem  Skin: Negative  Allergic/Immunologic: Negative  Neurological: Positive for dizziness, syncope, weakness and light-headedness  Hematological: Negative  Psychiatric/Behavioral: Negative          Historical Information   Past Medical History:   Diagnosis Date    A-fib ([facility] )     Asthma     CKD (chronic kidney disease)     Diabetes mellitus (HCC)     GERD (gastroesophageal reflux disease)     Hyperlipidemia     Hypertension     Kidney stones     PONV (postoperative nausea and vomiting)     SVT (supraventricular tachycardia) ([facility])      Past Surgical History:   Procedure Laterality Date    APPENDECTOMY      CYSTOSCOPY W/ LASER LITHOTRIPSY Left 2016    Procedure: CYSTOSCOPY URETEROSCOPY WITH LITHOTRIPSY HOLMIUM LASER, RETROGRADE PYELOGRAM AND INSERTION STENT URETERAL;  Surgeon: Naty Flores MD;  Location: [address] [place] [address];  Service:    Mathew Shahid DILATION AND CURETTAGE OF UTERUS      JOINT REPLACEMENT      right knee    KNEE ARTHROPLASTY Right     [place] CYSTOURETHROSCOPY,URETER CATHETER Left 2016    Procedure: CYSTOSCOPY RETROGRADE PYELOGRAM WITH INSERTION STENT URETERAL, left;  Surgeon: Naty Flores MD;  Location: [facility];  Service: Urology    SHOULDER ARTHROTOMY Left      Social History     Substance and Sexual Activity   Alcohol Use Not Currently    Frequency: Monthly or less    Drinks per session: 1 or 2    Binge frequency: Never    Comment: rarely     Social History     Substance and Sexual Activity   Drug Use No     Social History     Tobacco Use   Smoking Status Former Smoker    Packs/day:     Years:     Pack years: 20     Types: Cigarettes    Last attempt to quit: 1996    Years since quittin 0   Smokeless Tobacco Never Used     Family History:   Family History   Problem Relation Age of Onset    Heart disease Mother     Emphysema Maternal Grandmother     Heart disease Family        Meds/Allergies   all current active meds have been reviewed, current meds:   Current Facility-Administered Medications   Medication Dose Route Frequency    acetaminophen (TYLENOL) tablet 650 mg  650 mg Oral Q6H PRN    albuterol (PROVENTIL HFA,VENTOLIN HFA) inhaler 2 puff  2 puff Inhalation Q6H PRN    apixaban (ELIQUIS) tablet 5 mg  5 mg Oral BID    famotidine (PEPCID) tablet 20 mg  20 mg Oral Daily    insulin glargine (LANTUS) subcutaneous injection 30 Units 0 3 mL  30 Units Subcutaneous Q12H [facility]    insulin lispro (HumaLOG) 100 units/mL subcutaneous injection 1-6 Units  1-6 Units Subcutaneous TID AC    loratadine (CLARITIN) tablet 10 mg  10 mg Oral Daily    metoprolol tartrate (LOPRESSOR) tablet 100 mg  100 mg Oral Q12H [address]    montelukast (SINGULAIR) tablet 10 mg  10 mg Oral HS    ondansetron (ZOFRAN) injection 4 mg  4 mg Intravenous Q6H PRN    pregabalin (LYRICA) capsule 100 mg  100 mg Oral BID    saccharomyces boulardii (FLORASTOR) capsule 250 mg  250 mg Oral BID    sodium chloride 0 9 % infusion  75 mL/hr Intravenous Continuous    and PTA meds:   Prior to Admission Medications   Prescriptions Last Dose Informant Patient Reported? Taking? Dulaglutide (TRULICITY) 1 5 KR/1 4IH SOPN Past Month at Unknown time Self Yes Yes   Sig: Inject 1 5 mg under the skin once a week On hold at present   Insulin Glargine (TOUJEO SOLOSTAR) injection pen 300 units/mL 7/29/2019 at 0700 Self Yes Yes   Sig: Inject 30 Units under the skin 2 (two) times a day    albuterol (PROVENTIL HFA,VENTOLIN HFA) 90 mcg/act inhaler More than a month at Unknown time Self Yes No   Sig: Inhale 2 puffs every 6 (six) hours as needed for wheezing   apixaban (ELIQUIS) 5 mg Past Month at Unknown time Self No Yes   Sig: Take 1 tablet (5 mg total) by mouth 2 (two) times a day   Patient taking differently: Take 5 mg by mouth 2 (two) times a day Patient off Eliquis for EGD and colonscopy   Last dose 7/20   desloratadine (CLARINEX) 5 MG tablet Not Taking at Unknown time Self Yes No   Sig: Take by mouth   lisinopril (ZESTRIL) 10 mg tablet 7/29/2019 at Unknown time Self Yes Yes   Sig: Take 10 mg by mouth daily   metFORMIN (GLUCOPHAGE) 850 mg tablet 7/29/2019 at Unknown time Self Yes Yes   Sig: Take 1 tablet by mouth 2 (two) times a day with meals   metoprolol tartrate (LOPRESSOR) 100 mg tablet 7/29/2019 at Unknown time Self No Yes   Sig: Take 1 tablet (100 mg total) by mouth every 12 (twelve) hours   montelukast (SINGULAIR) 10 mg tablet 7/29/2019 at Unknown time Self Yes Yes   Sig: Take 10 mg by mouth daily     pregabalin (LYRICA) 100 mg capsule 7/29/2019 at Unknown time Self Yes Yes   Sig: Take 100 mg by mouth 2 (two) times a day    ranitidine (ZANTAC) 150 mg tablet 7/29/2019 at Unknown time Self Yes Yes   Sig: Take 150 mg by mouth 2 (two) times a day as needed     rosuvastatin (CRESTOR) 20 MG tablet 7/29/2019 at Unknown time Self Yes Yes   Sig: Take 20 mg by mouth daily   saccharomyces boulardii (FLORASTOR) 250 mg capsule Not Taking at Unknown time Self No No   Sig: Take 1 capsule (250 mg total) by mouth 2 (two) times a day   Patient not taking: Reported on 6/5/2019   torsemide (DEMADEX) 20 mg tablet 7/29/2019 at Unknown time Self Yes Yes   Sig: Take 20 mg by mouth daily       Facility-Administered Medications: None     Allergies   Allergen Reactions    Asa [Aspirin] GI Intolerance    Indocin [Indomethacin] Other (See Comments)     Made patient "loopy"    Penicillins Hives    Percocet [Oxycodone-Acetaminophen]        Objective   Vitals: Blood pressure 121/60, pulse 67, temperature 98 °F (36 7 °C), temperature source Oral, resp  rate 18, height 5' 2" (1 575 m), weight 114 kg (251 lb 11 2 oz), SpO2 99 %, not currently breastfeeding  Orthostatic Blood Pressures      Most Recent Value   Blood Pressure  121/60 filed at 07/30/2019 9801   Patient Position - Orthostatic VS  Lying filed at 07/30/2019 0751            Intake/Output Summary (Last 24 hours) at 7/30/2019 1344  Last data filed at 7/30/2019 1201  Gross per 24 hour   Intake 2560 ml   Output 500 ml   Net 2060 ml       Invasive Devices     Peripheral Intravenous Line            Peripheral IV 07/30/19 Right Forearm less than 1 day                Physical Exam   Constitutional: She is oriented to person, place, and time  She appears well-developed and well-nourished  No distress  HENT:   Head: Normocephalic and atraumatic  Right Ear: External ear normal    Left Ear: External ear normal    Eyes: Pupils are equal, round, and reactive to light  Conjunctivae are normal  Right eye exhibits no discharge  Left eye exhibits no discharge  No scleral icterus     Neck: Normal range of motion  Neck supple  No JVD present  No thyromegaly present  Cardiovascular: Normal rate, regular rhythm, normal heart sounds and intact distal pulses  No murmur heard  Pulmonary/Chest: Effort normal and breath sounds normal  No accessory muscle usage  No respiratory distress  She has no wheezes  She has no rales  Abdominal: Soft  Bowel sounds are normal  She exhibits no distension and no mass  Musculoskeletal: She exhibits no edema  Neurological: She is alert and oriented to person, place, and time  Skin: Skin is warm and dry  Capillary refill takes less than 2 seconds  She is not diaphoretic  Psychiatric: She has a normal mood and affect  Nursing note and vitals reviewed  Lab Results:   I have personally reviewed pertinent lab results      CBC with diff:   Results from last 7 days   Lab Units 07/30/19  0603   WBC Thousand/uL 7 20   RBC Million/uL 3 59*   HEMOGLOBIN g/dL 10 7*   HEMATOCRIT % 33 6*   MCV fL 94   MCH pg 29 8   MCHC g/dL 31 8   RDW % 13 3   MPV fL 11 1   PLATELETS Thousands/uL 159     CMP:   Results from last 7 days   Lab Units 07/30/19  0603 07/29/19  1519   SODIUM mmol/L 139 136   POTASSIUM mmol/L 4 5 5 1   CHLORIDE mmol/L 107 102   CO2 mmol/L 23 25   BUN mg/dL 59* 65*   CREATININE mg/dL 2 61* 3 53*   CALCIUM mg/dL 7 5* 7 8*   AST U/L  --  12   ALT U/L  --  27   ALK PHOS U/L  --  47   EGFR ml/min/1 73sq m 18 13     Troponin:   0   Lab Value Date/Time    TROPONINI <0 02 07/29/2019 1519    TROPONINI <0 02 06/25/2019 1657    TROPONINI <0 02 05/25/2019 1442    TROPONINI <0 02 03/08/2018 1231     BNP:   Results from last 7 days   Lab Units 07/30/19  0603   POTASSIUM mmol/L 4 5   CHLORIDE mmol/L 107   CO2 mmol/L 23   BUN mg/dL 59*   CREATININE mg/dL 2 61*   CALCIUM mg/dL 7 5*   EGFR ml/min/1 73sq m 18     TSH:   Results from last 7 days   Lab Units 07/30/19  0603   TSH 3RD GENERATON uIU/mL 2 017     Magnesium:   Results from last 7 days   Lab Units 07/30/19  0603   MAGNESIUM mg/dL 1 6     Imaging: I have personally reviewed pertinent reports      EKG:  Sinus rhythm  VTE Prophylaxis: Sequential compression device Alejandro Garzon)     Code Status: Level 1 - Full Code  Advance Directive and Living Will:      Power of :    POLST:      Angel Johnson, [address] [place] [address]

## 2019-10-21 DIAGNOSIS — B00.1 HERPES LABIALIS WITHOUT COMPLICATION: Primary | ICD-10-CM

## 2019-10-21 RX ORDER — VALACYCLOVIR HYDROCHLORIDE 1 G/1
2000 TABLET, FILM COATED ORAL 2 TIMES DAILY
Qty: 4 TABLET | Refills: 1 | Status: SHIPPED | OUTPATIENT
Start: 2019-10-21 | End: 2021-04-14

## 2020-09-28 ENCOUNTER — OFFICE VISIT (OUTPATIENT)
Dept: INTERNAL MEDICINE CLINIC | Facility: CLINIC | Age: 35
End: 2020-09-28
Payer: COMMERCIAL

## 2020-09-28 VITALS
WEIGHT: 191.8 LBS | RESPIRATION RATE: 18 BRPM | SYSTOLIC BLOOD PRESSURE: 134 MMHG | BODY MASS INDEX: 33.98 KG/M2 | TEMPERATURE: 97.2 F | OXYGEN SATURATION: 98 % | HEART RATE: 71 BPM | DIASTOLIC BLOOD PRESSURE: 84 MMHG | HEIGHT: 63 IN

## 2020-09-28 DIAGNOSIS — Z00.00 ANNUAL PHYSICAL EXAM: ICD-10-CM

## 2020-09-28 DIAGNOSIS — G40.909 SEIZURE DISORDER (HCC): ICD-10-CM

## 2020-09-28 DIAGNOSIS — D22.9 CHANGE IN MOLE: Primary | ICD-10-CM

## 2020-09-28 DIAGNOSIS — N87.0 MILD DYSPLASIA OF CERVIX (CIN I): ICD-10-CM

## 2020-09-28 DIAGNOSIS — E03.9 HYPOTHYROIDISM, UNSPECIFIED TYPE: ICD-10-CM

## 2020-09-28 DIAGNOSIS — Z13.228 SCREENING FOR METABOLIC DISORDER: ICD-10-CM

## 2020-09-28 DIAGNOSIS — O24.419 GESTATIONAL DIABETES MELLITUS (GDM), ANTEPARTUM, GESTATIONAL DIABETES METHOD OF CONTROL UNSPECIFIED: ICD-10-CM

## 2020-09-28 DIAGNOSIS — M79.672 PAIN OF LEFT HEEL: ICD-10-CM

## 2020-09-28 PROCEDURE — 99395 PREV VISIT EST AGE 18-39: CPT | Performed by: NURSE PRACTITIONER

## 2020-09-28 RX ORDER — MELOXICAM 15 MG/1
15 TABLET ORAL DAILY
Qty: 30 TABLET | Refills: 1 | Status: SHIPPED | OUTPATIENT
Start: 2020-09-28 | End: 2022-07-24

## 2020-09-28 NOTE — ASSESSMENT & PLAN NOTE
Will get xray of heel, will start patient on mobic 15mg tablet daily  Will also give referral to podiatry

## 2020-09-28 NOTE — ASSESSMENT & PLAN NOTE
Patient's most recent seizure 2011, patient currently not on seizure medication, has seen neurology in the past

## 2020-09-28 NOTE — PROGRESS NOTES
[facility]  Well Adult Female Physical Visit  Patient ID: Akanksha Gordon    : 1985  Age/Gender: 38 y o  female     DATE: 2020      Assessment/Plan:    Hypothyroidism  Will get updated TSH is patient has been off of her thyroid medication for quite some time  Seizure disorder ([address] )  Patient's most recent seizure , patient currently not on seizure medication, has seen neurology in the past     Mild dysplasia of cervix (DAVINA I)  Advised patient to follow-up with gyn, patient due for cervical cancer screening as well as advised patient to do monthly self-breast exams  Annual physical exam  Will get fasting blood work, advised patient to continue to work on a well-balanced diet, and to incorporate daily exercise  Patient will get flu vaccination at her current workplace  Pain of left heel  Will get xray of heel, will start patient on mobic 15mg tablet daily  Will also give referral to podiatry  BMI Counseling: Body mass index is 33 98 kg/m²  The BMI is above normal  Nutrition recommendations include decreasing portion sizes, encouraging healthy choices of fruits and vegetables, decreasing fast food intake, consuming healthier snacks, limiting drinks that contain sugar, moderation in carbohydrate intake, increasing intake of lean protein, reducing intake of saturated and trans fat and reducing intake of cholesterol  Exercise recommendations include moderate physical activity 150 minutes/week and exercising 3-5 times per week  Diagnoses and all orders for this visit:    Change in mole  -     Ambulatory referral to Dermatology; Future    Pain of left heel  -     XR heel / calcaneus 2+ vw left; Future  -     Ambulatory referral to Podiatry; Future  -     meloxicam (MOBIC) 15 mg tablet;  Take 1 tablet (15 mg total) by mouth daily    Hypothyroidism, unspecified type  -     TSH, 3rd generation with Free T4 reflex    Screening for metabolic disorder  -     CBC and differential  -     Comprehensive metabolic panel;  Future  -     Lipid panel    Gestational diabetes mellitus (GDM), antepartum, gestational diabetes method of control unspecified  -     Hemoglobin A1C    Seizure disorder (HCC)    Mild dysplasia of cervix (DAVINA I)    Annual physical exam          Subjective:     Mandie Frye is a 38 y o  female who presents to the office on 9/28/2020 for a health maintenance physical     Hypothyroidism- has not been taking levothyroxine because her levels were abnormal, patient has not been taking medication for about two years    The following portions of the patient's history were reviewed and updated as appropriate: allergies, current medications, past family history, past medical history, past social history, past surgical history and problem list     Pt reports overall health: could be a lot better  Last Physical: unknown   Last GYN Exam:  2017    Healthy Diet: well balanced, eat lot of fruit and veggies  Routine Exercise: walks about 2-3 times a week  Weight Concerns: would like to lose weight, has gained about 30 lbs since her sons birth    Problems with vision: contact, reports vision has gotten worse  Last Eye Exam: has been about a year and a half     Problems with Hearing: denies    Routine Dental Exams: unknown     Smoking History: denies  ETOH Use: socially    Illegal Drug Use: denies  Caffeine Use: 12 oz can     Reproductive Health:    Last PAP: 2017  History of abnormal PAP: in the past  LMP: 2 weeks ago  Sexually Active:  Currently  Contraceptive: tube tied  Problems with menstrual cycle: denies  Vaginal Discharge: denies     Self Breast Exams: denies   Denies family history of breast CA  Depression Screening:  PHQ-9 Depression Screening    PHQ-9:    Frequency of the following problems over the past two weeks:       Little interest or pleasure in doing things:  0 - not at all  Feeling down, depressed, or hopeless:  0 - not at all  PHQ-2 Score:  0         Family History of Colon CA: denies     Last Labs: 2018    Review of Systems   Constitutional: Negative for activity change, appetite change, chills, diaphoresis and fever  HENT: Negative for congestion, ear discharge, ear pain, postnasal drip, rhinorrhea, sinus pressure, sinus pain and sore throat  Eyes: Negative for pain, discharge, itching and visual disturbance  Respiratory: Negative for cough, chest tightness, shortness of breath and wheezing  Cardiovascular: Negative for chest pain, palpitations and leg swelling  Gastrointestinal: Negative for abdominal pain, blood in stool, constipation, diarrhea, nausea and vomiting  Endocrine: Negative for polydipsia, polyphagia and polyuria  Genitourinary: Negative for difficulty urinating, dysuria, hematuria and urgency  Musculoskeletal: Positive for arthralgias  Negative for back pain and neck pain  Skin: Negative for rash and wound  Neurological: Positive for seizures (history of in the past, last )  Negative for dizziness, weakness, numbness and headaches           Patient Active Problem List   Diagnosis    Hypothyroidism    Gastroenteritis    GERD (gastroesophageal reflux disease)    Ganglion cyst of volar aspect of left wrist    Nausea    S/P repeat low transverse     Mild dysplasia of cervix (DAVINA I)    Se[facility] disorder [address] [address] )    S/P tubal ligation    Annual physical exam    Pain of left heel       Past Medical History:   Diagnosis Date    Gastroenteritis     last assessed: 16    Hypothyroidism 2018    Plantar fasciitis, right     last assessed: 04/16/15    PONV (postoperative nausea and vomiting)     Seizures ([facility] [address] )     last assessed: 08/15/17    Thyroid disease     last assessed: 08/15/17    Viral gastroenteritis     last assessed: 17       Past Surgical History:   Procedure Laterality Date     SECTION      MOUTH SURGERY Bilateral   DE  DELIVERY ONLY N/A 2018    Procedure:  SECTION () REPEAT;  Surgeon: April Briones DO;  [facility];  Service: Obstetrics    DE EXCIS PRIMARY GANGLION WRIST Left 3/15/2018    Procedure: VOLAR WRIST GANGLION CYST EXCISION;  Surgeon: Eli Lion MD;  [facility];  Service: Orthopedics    TUBAL LIGATION Bilateral 2018    Procedure: LIGATION/COAGULATION TUBAL;  Surgeon: April Briones DO;  [facility];  Service: Obstetrics    WISDOM TOOTH EXTRACTION           Current Outpatient Medications:     meloxicam (MOBIC) 15 mg tablet, Take 1 tablet (15 mg total) by mouth daily, Disp: 30 tablet, Rfl: 1    valACYclovir (VALTREX) 1,000 mg tablet, Take 2 tablets (2,000 mg total) by mouth 2 (two) times a day for 1 day, Disp: 4 tablet, Rfl: 1    Allergies   Allergen Reactions    Seasonal Ic [Cholestatin] Itching     Itchy eyes, congestion       Social History     Socioeconomic History    Marital status: /Civil Union     Spouse name: None    Number of children: None    Years of education: None    Highest education level: None   Occupational History    None   Social Needs    Financial resource strain: None    Food insecurity     Worry: None     Inability: None    Transportation needs     Medical: None     Non-medical: None   Tobacco Use    Smoking status: Never Smoker    Smokeless tobacco: Never Used   Substance and Sexual Activity    Alcohol use:  Yes     Alcohol/week: 1 0 standard drinks     Types: 1 Cans of beer per week     Comment: one per month    Drug use: No    Sexual activity: Yes   Lifestyle    Physical activity     Days per week: None     Minutes per session: None    Stress: None   Relationships    Social connections     Talks on phone: None     Gets together: None     Attends [employer] service: None     Active member of club or organization: None     Attends meetings of clubs or organizations: None     Relationship status: None    Intimate partner violence     Fear of current or ex partner: None     Emotionally abused: None     Physically abused: None     Forced sexual activity: None   Other Topics Concern    None   Social History Narrative    Caffeine use    Uses safety equipment - seatbelts       Family History   Problem Relation Age of Onset    Cancer Maternal Grandmother     Cancer Maternal Grandfather     Skin cancer Maternal Grandfather     Diabetes Paternal Grandmother     No Known Problems Paternal Grandfather     No Known Problems Mother     No Known Problems Father        Immunization History   Administered Date(s) Administered    Influenza Quadrivalent Preservative Free 3 years and older IM 11/09/2018    Influenza Quadrivalent, 6-35 Months IM 12/18/2017    Influenza TIV (IM) 11/01/2016    Tdap 11/12/2015, 10/15/2018        Health Maintenance   Topic Date Due    BMI: Followup Plan  02/22/2001    Annual Physical  02/22/2001    Influenza Vaccine  07/01/2020    Depression Screening PHQ  09/28/2021    BMI: Adult  09/28/2021    Cervical Cancer Screening  09/12/2022    DTaP,Tdap,and Td Vaccines (3 - Td) 10/15/2028    HIV Screening  Completed    Pneumococcal Vaccine: Pediatrics (0 to 5 Years) and At-Risk Patients (6 to 59 Years)  Aged Out    HIB Vaccine  Aged Out    Hepatitis B Vaccine  Aged Out    IPV Vaccine  Aged Out    Hepatitis A Vaccine  Aged Out    Meningococcal ACWY Vaccine  Aged Out    HPV Vaccine  Aged Out         Objective:  Vitals:    09/28/20 0948   BP: 134/84   BP Location: Left arm   Patient Position: Sitting   Cuff Size: Standard   Pulse: 71   Resp: 18   Temp: (!) 97 2 °F (36 2 °C)   SpO2: 98%   Weight: 87 kg (191 lb 12 8 oz)   Height: 5' 3" (1 6 m)     Wt Readings from Last 3 Encounters:   09/28/20 87 kg (191 lb 12 8 oz)   12/17/18 85 3 kg (188 lb)   08/19/18 84 8 kg (187 lb)     Body mass index is 33 98 kg/m²    No exam data present       Physical Exam  Constitutional:       General: She is not in acute distress  Appearance: She is well-developed  She is not diaphoretic  HENT:      Head: Normocephalic and atraumatic  Right Ear: External ear normal       Left Ear: External ear normal       Nose: Nose normal       Mouth/Throat:      Pharynx: No oropharyngeal exudate  Eyes:      General:         Right eye: No discharge  Left eye: No discharge  Conjunctiva/sclera: Conjunctivae normal       Pupils: Pupils are equal, round, and reactive to light  Neck:      Musculoskeletal: Normal range of motion and neck supple  Thyroid: No thyromegaly  Cardiovascular:      Rate and Rhythm: Normal rate and regular rhythm  Heart sounds: Normal heart sounds  No murmur  No friction rub  No gallop  Pulmonary:      Effort: Pulmonary effort is normal  No respiratory distress  Breath sounds: Normal breath sounds  No stridor  No wheezing or rales  Abdominal:      General: Bowel sounds are normal  There is no distension  Palpations: Abdomen is soft  Tenderness: There is no abdominal tenderness  Lymphadenopathy:      Cervical: No cervical adenopathy  Skin:     General: Skin is warm and dry  Findings: No erythema or rash  Neurological:      Mental Status: She is alert and oriented to person, place, and time  Psychiatric:         Behavior: Behavior normal          Thought Content: Thought content normal          Judgment: Judgment normal              Future Appointments   Date Time Provider Ondina Kochi   10/4/2021 10:15 AM [address]    Patient Care Team:  [address] [facility] as PCP - General (Family Medicine)  Yesica Monson MD (Maternal and Fetal Medicine)  Daisy Chaudhari MD (Maternal and Fetal Medicine)  Diana Manzanares MD (Maternal and Fetal Medicine)  Brendan Engle MD (Maternal and Fetal Medicine)  [facility] (Obstetrics and Gynecology)

## 2020-09-28 NOTE — ASSESSMENT & PLAN NOTE
Advised patient to follow-up with gyn, patient due for cervical cancer screening as well as advised patient to do monthly self-breast exams

## 2020-09-28 NOTE — ASSESSMENT & PLAN NOTE
Will get fasting blood work, advised patient to continue to work on a well-balanced diet, and to incorporate daily exercise  Patient will get flu vaccination at her current workplace

## 2020-09-29 ENCOUNTER — CLINICAL SUPPORT (OUTPATIENT)
Dept: INTERNAL MEDICINE CLINIC | Facility: CLINIC | Age: 35
End: 2020-09-29
Payer: COMMERCIAL

## 2020-09-29 DIAGNOSIS — Z13.228 SCREENING FOR METABOLIC DISORDER: Primary | ICD-10-CM

## 2020-09-29 DIAGNOSIS — O24.419 GESTATIONAL DIABETES MELLITUS (GDM), ANTEPARTUM, GESTATIONAL DIABETES METHOD OF CONTROL UNSPECIFIED: ICD-10-CM

## 2020-09-29 DIAGNOSIS — E03.9 HYPOTHYROIDISM, UNSPECIFIED TYPE: ICD-10-CM

## 2020-09-29 LAB
ALBUMIN SERPL BCP-MCNC: 4.1 G/DL (ref 3.5–5)
ALP SERPL-CCNC: 79 U/L (ref 46–116)
ALT SERPL W P-5'-P-CCNC: 33 U/L (ref 12–78)
ANION GAP SERPL CALCULATED.3IONS-SCNC: 5 MMOL/L (ref 4–13)
AST SERPL W P-5'-P-CCNC: 20 U/L (ref 5–45)
BASOPHILS # BLD AUTO: 0.05 THOUSANDS/ΜL (ref 0–0.1)
BASOPHILS NFR BLD AUTO: 1 % (ref 0–1)
BILIRUB SERPL-MCNC: 0.28 MG/DL (ref 0.2–1)
BUN SERPL-MCNC: 18 MG/DL (ref 5–25)
CALCIUM SERPL-MCNC: 9.5 MG/DL (ref 8.3–10.1)
CHLORIDE SERPL-SCNC: 106 MMOL/L (ref 100–108)
CHOLEST SERPL-MCNC: 169 MG/DL (ref 50–200)
CO2 SERPL-SCNC: 28 MMOL/L (ref 21–32)
CREAT SERPL-MCNC: 0.88 MG/DL (ref 0.6–1.3)
EOSINOPHIL # BLD AUTO: 0.1 THOUSAND/ΜL (ref 0–0.61)
EOSINOPHIL NFR BLD AUTO: 1 % (ref 0–6)
ERYTHROCYTE [DISTWIDTH] IN BLOOD BY AUTOMATED COUNT: 12.6 % (ref 11.6–15.1)
EST. AVERAGE GLUCOSE BLD GHB EST-MCNC: 108 MG/DL
GFR SERPL CREATININE-BSD FRML MDRD: 84 ML/MIN/1.73SQ M
GLUCOSE P FAST SERPL-MCNC: 93 MG/DL (ref 65–99)
HBA1C MFR BLD: 5.4 %
HCT VFR BLD AUTO: 40.7 % (ref 34.8–46.1)
HDLC SERPL-MCNC: 48 MG/DL
HGB BLD-MCNC: 13.1 G/DL (ref 11.5–15.4)
IMM GRANULOCYTES # BLD AUTO: 0.02 THOUSAND/UL (ref 0–0.2)
IMM GRANULOCYTES NFR BLD AUTO: 0 % (ref 0–2)
LDLC SERPL CALC-MCNC: 104 MG/DL (ref 0–100)
LYMPHOCYTES # BLD AUTO: 2.44 THOUSANDS/ΜL (ref 0.6–4.47)
LYMPHOCYTES NFR BLD AUTO: 26 % (ref 14–44)
MCH RBC QN AUTO: 29.7 PG (ref 26.8–34.3)
MCHC RBC AUTO-ENTMCNC: 32.2 G/DL (ref 31.4–37.4)
MCV RBC AUTO: 92 FL (ref 82–98)
MONOCYTES # BLD AUTO: 0.57 THOUSAND/ΜL (ref 0.17–1.22)
MONOCYTES NFR BLD AUTO: 6 % (ref 4–12)
NEUTROPHILS # BLD AUTO: 6.08 THOUSANDS/ΜL (ref 1.85–7.62)
NEUTS SEG NFR BLD AUTO: 66 % (ref 43–75)
NONHDLC SERPL-MCNC: 121 MG/DL
NRBC BLD AUTO-RTO: 0 /100 WBCS
PLATELET # BLD AUTO: 196 THOUSANDS/UL (ref 149–390)
PMV BLD AUTO: 12 FL (ref 8.9–12.7)
POTASSIUM SERPL-SCNC: 5 MMOL/L (ref 3.5–5.3)
PROT SERPL-MCNC: 7.7 G/DL (ref 6.4–8.2)
RBC # BLD AUTO: 4.41 MILLION/UL (ref 3.81–5.12)
SODIUM SERPL-SCNC: 139 MMOL/L (ref 136–145)
T4 FREE SERPL-MCNC: 0.79 NG/DL (ref 0.76–1.46)
TRIGL SERPL-MCNC: 87 MG/DL
TSH SERPL DL<=0.05 MIU/L-ACNC: 5.45 UIU/ML (ref 0.36–3.74)
WBC # BLD AUTO: 9.26 THOUSAND/UL (ref 4.31–10.16)

## 2020-09-29 PROCEDURE — 80061 LIPID PANEL: CPT | Performed by: NURSE PRACTITIONER

## 2020-09-29 PROCEDURE — 84443 ASSAY THYROID STIM HORMONE: CPT | Performed by: NURSE PRACTITIONER

## 2020-09-29 PROCEDURE — 36415 COLL VENOUS BLD VENIPUNCTURE: CPT

## 2020-09-29 PROCEDURE — 80053 COMPREHEN METABOLIC PANEL: CPT | Performed by: NURSE PRACTITIONER

## 2020-09-29 PROCEDURE — 85025 COMPLETE CBC W/AUTO DIFF WBC: CPT | Performed by: NURSE PRACTITIONER

## 2020-09-29 PROCEDURE — 83036 HEMOGLOBIN GLYCOSYLATED A1C: CPT | Performed by: NURSE PRACTITIONER

## 2020-09-29 PROCEDURE — 84439 ASSAY OF FREE THYROXINE: CPT

## 2020-09-30 DIAGNOSIS — E03.9 HYPOTHYROIDISM, UNSPECIFIED TYPE: Primary | ICD-10-CM

## 2020-09-30 DIAGNOSIS — E55.9 VITAMIN D INSUFFICIENCY: ICD-10-CM

## 2020-09-30 RX ORDER — LEVOTHYROXINE SODIUM 0.03 MG/1
25 TABLET ORAL DAILY
Qty: 90 TABLET | Refills: 0 | Status: SHIPPED | OUTPATIENT
Start: 2020-09-30 | End: 2020-10-01 | Stop reason: SDUPTHER

## 2020-10-01 DIAGNOSIS — E03.9 HYPOTHYROIDISM, UNSPECIFIED TYPE: ICD-10-CM

## 2020-10-01 RX ORDER — LEVOTHYROXINE SODIUM 0.03 MG/1
25 TABLET ORAL DAILY
Qty: 90 TABLET | Refills: 0 | Status: SHIPPED | OUTPATIENT
Start: 2020-10-01 | End: 2020-11-12 | Stop reason: SDUPTHER

## 2020-11-12 ENCOUNTER — CLINICAL SUPPORT (OUTPATIENT)
Dept: INTERNAL MEDICINE CLINIC | Facility: CLINIC | Age: 35
End: 2020-11-12
Payer: COMMERCIAL

## 2020-11-12 ENCOUNTER — TELEPHONE (OUTPATIENT)
Dept: INTERNAL MEDICINE CLINIC | Facility: CLINIC | Age: 35
End: 2020-11-12

## 2020-11-12 DIAGNOSIS — E55.9 VITAMIN D INSUFFICIENCY: ICD-10-CM

## 2020-11-12 DIAGNOSIS — E03.9 HYPOTHYROIDISM, UNSPECIFIED TYPE: Primary | ICD-10-CM

## 2020-11-12 DIAGNOSIS — E55.9 VITAMIN D DEFICIENCY: Primary | ICD-10-CM

## 2020-11-12 DIAGNOSIS — E03.9 HYPOTHYROIDISM, UNSPECIFIED TYPE: ICD-10-CM

## 2020-11-12 LAB
25(OH)D3 SERPL-MCNC: 25.9 NG/ML (ref 30–100)
T4 FREE SERPL-MCNC: 0.75 NG/DL (ref 0.76–1.46)
TSH SERPL DL<=0.05 MIU/L-ACNC: 5.12 UIU/ML (ref 0.36–3.74)

## 2020-11-12 PROCEDURE — 84439 ASSAY OF FREE THYROXINE: CPT

## 2020-11-12 PROCEDURE — 36415 COLL VENOUS BLD VENIPUNCTURE: CPT

## 2020-11-12 PROCEDURE — 82306 VITAMIN D 25 HYDROXY: CPT | Performed by: NURSE PRACTITIONER

## 2020-11-12 PROCEDURE — 84443 ASSAY THYROID STIM HORMONE: CPT | Performed by: NURSE PRACTITIONER

## 2020-11-12 RX ORDER — ERGOCALCIFEROL 1.25 MG/1
50000 CAPSULE ORAL WEEKLY
Qty: 12 CAPSULE | Refills: 0 | Status: SHIPPED | OUTPATIENT
Start: 2020-11-12 | End: 2021-04-14 | Stop reason: SDUPTHER

## 2020-11-12 RX ORDER — LEVOTHYROXINE SODIUM 0.05 MG/1
50 TABLET ORAL DAILY
Qty: 90 TABLET | Refills: 1 | Status: SHIPPED | OUTPATIENT
Start: 2020-11-12 | End: 2021-02-03 | Stop reason: SDUPTHER

## 2020-12-08 ENCOUNTER — TELEMEDICINE (OUTPATIENT)
Dept: INTERNAL MEDICINE CLINIC | Facility: CLINIC | Age: 35
End: 2020-12-08
Payer: COMMERCIAL

## 2020-12-08 DIAGNOSIS — U07.1 COVID-19: Primary | ICD-10-CM

## 2020-12-08 PROCEDURE — 99213 OFFICE O/P EST LOW 20 MIN: CPT | Performed by: NURSE PRACTITIONER

## 2021-01-17 ENCOUNTER — IMMUNIZATIONS (OUTPATIENT)
Dept: FAMILY MEDICINE CLINIC | Facility: HOSPITAL | Age: 36
End: 2021-01-17

## 2021-01-17 DIAGNOSIS — Z23 ENCOUNTER FOR IMMUNIZATION: Primary | ICD-10-CM

## 2021-02-03 DIAGNOSIS — E03.9 HYPOTHYROIDISM, UNSPECIFIED TYPE: ICD-10-CM

## 2021-02-03 RX ORDER — LEVOTHYROXINE SODIUM 0.05 MG/1
50 TABLET ORAL DAILY
Qty: 90 TABLET | Refills: 1 | Status: SHIPPED | OUTPATIENT
Start: 2021-02-03 | End: 2021-08-23 | Stop reason: SDUPTHER

## 2021-02-03 NOTE — TELEPHONE ENCOUNTER
12/8/2020  10/04/2021  Patient has asked to have script/refills canceled at [facility]  [facility]     Thanks

## 2021-02-06 ENCOUNTER — IMMUNIZATIONS (OUTPATIENT)
Dept: FAMILY MEDICINE CLINIC | Facility: HOSPITAL | Age: 36
End: 2021-02-06

## 2021-02-06 DIAGNOSIS — Z23 ENCOUNTER FOR IMMUNIZATION: Primary | ICD-10-CM

## 2021-02-06 PROCEDURE — 91300 SARS-COV-2 / COVID-19 MRNA VACCINE (PFIZER-BIONTECH) 30 MCG: CPT

## 2021-02-06 PROCEDURE — 0002A SARS-COV-2 / COVID-19 MRNA VACCINE (PFIZER-BIONTECH) 30 MCG: CPT

## 2021-04-14 ENCOUNTER — OFFICE VISIT (OUTPATIENT)
Dept: INTERNAL MEDICINE CLINIC | Facility: CLINIC | Age: 36
End: 2021-04-14
Payer: COMMERCIAL

## 2021-04-14 VITALS
HEART RATE: 95 BPM | HEIGHT: 63 IN | WEIGHT: 190.4 LBS | TEMPERATURE: 98.5 F | SYSTOLIC BLOOD PRESSURE: 132 MMHG | OXYGEN SATURATION: 98 % | DIASTOLIC BLOOD PRESSURE: 84 MMHG | BODY MASS INDEX: 33.73 KG/M2

## 2021-04-14 DIAGNOSIS — Z13.228 SCREENING FOR METABOLIC DISORDER: ICD-10-CM

## 2021-04-14 DIAGNOSIS — G40.909 SEIZURE DISORDER (HCC): ICD-10-CM

## 2021-04-14 DIAGNOSIS — E55.9 VITAMIN D DEFICIENCY: ICD-10-CM

## 2021-04-14 DIAGNOSIS — E03.9 HYPOTHYROIDISM, UNSPECIFIED TYPE: Primary | ICD-10-CM

## 2021-04-14 PROBLEM — K52.9 GASTROENTERITIS: Status: RESOLVED | Noted: 2018-01-25 | Resolved: 2021-04-14

## 2021-04-14 PROCEDURE — 99214 OFFICE O/P EST MOD 30 MIN: CPT | Performed by: NURSE PRACTITIONER

## 2021-04-14 RX ORDER — ERGOCALCIFEROL 1.25 MG/1
50000 CAPSULE ORAL WEEKLY
Qty: 12 CAPSULE | Refills: 3 | Status: SHIPPED | OUTPATIENT
Start: 2021-04-14 | End: 2022-04-18 | Stop reason: SDUPTHER

## 2021-04-14 NOTE — ASSESSMENT & PLAN NOTE
Will start patient on ergocalciferol 06166 units to take once weekly for the next 12 weeks, patient is to get follow-up blood work which will include a vitamin-D level after she finishes 12 week course  After patient finishes vitamin-D supplementation with 98356 units, patient is to take 2000 International Units of vitamin-D daily

## 2021-04-14 NOTE — PROGRESS NOTES
Assessment/Plan:    Hypothyroidism    Will get updated TSH, will continue with levothyroxine 50 mg tablet daily  Seizure disorder ([address] )  Patient's most recent seizure 2011, patient currently not on seizure medication, has seen neurology in the past     Vitamin D deficiency  Will start patient on ergocalciferol 76585 units to take once weekly for the next 12 weeks, patient is to get follow-up blood work which will include a vitamin-D level after she finishes 12 week course  After patient finishes vitamin-D supplementation with 28268 units, patient is to take 2000 International Units of vitamin-D daily  BMI Counseling: Body mass index is 33 73 kg/m²  The BMI is above normal  Nutrition recommendations include decreasing portion sizes, encouraging healthy choices of fruits and vegetables, decreasing fast food intake, consuming healthier snacks, limiting drinks that contain sugar, moderation in carbohydrate intake, increasing intake of lean protein, reducing intake of saturated and trans fat and reducing intake of cholesterol  Exercise recommendations include moderate physical activity 150 minutes/week and exercising 3-5 times per week  Diagnoses and all orders for this visit:    Hypothyroidism, unspecified type  -     TSH, 3rd generation with Free T4 reflex    Screening for metabolic disorder  -     CBC and differential  -     Comprehensive metabolic panel; Future  -     Lipid panel    Vitamin D deficiency  -     ergocalciferol (VITAMIN D2) 50,000 units; Take 1 capsule (50,000 Units total) by mouth once a week    Seizure disorder (HCC)          Subjective:      Patient ID: Lamar Sands is a 43 y o  female  Patient presents today to follow-up on hypothyroidism as well as vitamin-D deficiency  Patient reports no new concerns today      Hypothyroidism-   Patient is asymptomatic, patient has been taking  Levothyroxine as prescribed     vitamin-D deficiency- patient has been taking vitamin-D supplementation, patient reports improvement in fatigue      The following portions of the patient's history were reviewed and updated as appropriate: allergies, current medications, past family history, past medical history, past social history, past surgical history and problem list     Review of Systems   Constitutional: Negative for activity change, appetite change, chills, diaphoresis and fever  HENT: Negative for congestion, ear discharge, ear pain, postnasal drip, rhinorrhea, sinus pressure, sinus pain and sore throat  Eyes: Negative for pain, discharge, itching and visual disturbance  Respiratory: Negative for cough, chest tightness, shortness of breath and wheezing  Cardiovascular: Negative for chest pain, palpitations and leg swelling  Gastrointestinal: Negative for abdominal pain, constipation, diarrhea, nausea and vomiting  Endocrine: Negative for polydipsia, polyphagia and polyuria  Genitourinary: Negative for difficulty urinating, dysuria and urgency  Musculoskeletal: Negative for arthralgias, back pain and neck pain  Skin: Negative for rash and wound  Neurological: Negative for dizziness, weakness, numbness and headaches           Past Medical History:   Diagnosis Date    Gastroenteritis     last assessed: 08/04/16    Hypothyroidism 01/25/2018    Plantar fasciitis, right     last assessed: 04/16/15    PONV (postoperative nausea and vomiting)     Seizures (HCC)     last assessed: 08/15/17    Thyroid disease     last assessed: 08/15/17    Viral gastroenteritis     last assessed: 03/27/17         Current Outpatient Medications:     levothyroxine 50 mcg tablet, Take 1 tablet (50 mcg total) by mouth daily, Disp: 90 tablet, Rfl: 1    valACYclovir (VALTREX) 1,000 mg tablet, Take 2 tablets (2,000 mg total) by mouth 2 (two) times a day for 1 day, Disp: 4 tablet, Rfl: 1    ergocalciferol (VITAMIN D2) 50,000 units, Take 1 capsule (50,000 Units total) by mouth once a week, Disp: 12 capsule, Rfl: 3    meloxicam (MOBIC) 15 mg tablet, Take 1 tablet (15 mg total) by mouth daily (Patient not taking: Reported on 2021), Disp: 30 tablet, Rfl: 1    Allergies   Allergen Reactions    Seasonal Ic [Cholestatin] Itching     Itchy eyes, congestion       Social History   Past Surgical History:   Procedure Laterality Date     SECTION      MOUTH SURGERY Bilateral     MT  DELIVERY ONLY N/A 2018    Procedure:  SECTION () REPEAT;  Surgeon: She Dubon DO;  [facility];  Service: Obstetrics    MT EXCIS PRIMARY GANGLION WRIST Left 3/15/2018    Procedure: VOLAR WRIST GANGLION CYST EXCISION;  Surgeon: Nga Welsh MD;  [facility];  Service: Orthopedics    TUBAL LIGATION Bilateral 2018    Procedure: LIGATION/COAGULATION TUBAL;  Surgeon: She Dubon DO;  [facility];  Service: Obstetrics    WISDOM TOOTH EXTRACTION       Family History   Problem Relation Age of Onset    Cancer Maternal Grandmother     Cancer Maternal Grandfather     Skin cancer Maternal Grandfather     Diabetes Paternal Grandmother     No Known Problems Paternal Grandfather     No Known Problems Mother     No Known Problems Father        Objective:  /84 (BP Location: Left arm, Patient Position: Sitting, Cuff Size: Adult)   Pulse 95   Temp 98 5 °F (36 9 °C)   Ht 5' 3" (1 6 m)   Wt 86 4 kg (190 lb 6 4 oz)   SpO2 98%   BMI 33 73 kg/m²     No results found for this or any previous visit (from the past 1344 hour(s))  Physical Exam  Constitutional:       General: She is not in acute distress  Appearance: She is well-developed  She is not diaphoretic  HENT:      Head: Normocephalic and atraumatic  Right Ear: External ear normal       Left Ear: External ear normal       Nose: Nose normal       Mouth/Throat:      Pharynx: No oropharyngeal exudate  Eyes:      General:         Right eye: No discharge           Left eye: No discharge  Conjunctiva/sclera: Conjunctivae normal       Pupils: Pupils are equal, round, and reactive to light  Neck:      Musculoskeletal: Normal range of motion and neck supple  Thyroid: No thyromegaly  Cardiovascular:      Rate and Rhythm: Normal rate and regular rhythm  Heart sounds: Normal heart sounds  No murmur  No friction rub  No gallop  Pulmonary:      Effort: Pulmonary effort is normal  No respiratory distress  Breath sounds: Normal breath sounds  No stridor  No wheezing or rales  Abdominal:      General: Bowel sounds are normal  There is no distension  Palpations: Abdomen is soft  Tenderness: There is no abdominal tenderness  Lymphadenopathy:      Cervical: No cervical adenopathy  Skin:     General: Skin is warm and dry  Findings: No erythema or rash  Neurological:      Mental Status: She is alert and oriented to person, place, and time  Psychiatric:         Behavior: Behavior normal          Thought Content:  Thought content normal          Judgment: Judgment normal

## 2021-04-20 ENCOUNTER — CLINICAL SUPPORT (OUTPATIENT)
Dept: INTERNAL MEDICINE CLINIC | Facility: CLINIC | Age: 36
End: 2021-04-20
Payer: COMMERCIAL

## 2021-04-20 DIAGNOSIS — E03.9 HYPOTHYROIDISM, UNSPECIFIED TYPE: ICD-10-CM

## 2021-04-20 DIAGNOSIS — Z13.228 SCREENING FOR METABOLIC DISORDER: Primary | ICD-10-CM

## 2021-04-20 LAB
ALBUMIN SERPL BCP-MCNC: 3.9 G/DL (ref 3.5–5)
ALP SERPL-CCNC: 68 U/L (ref 46–116)
ALT SERPL W P-5'-P-CCNC: 32 U/L (ref 12–78)
ANION GAP SERPL CALCULATED.3IONS-SCNC: 3 MMOL/L (ref 4–13)
AST SERPL W P-5'-P-CCNC: 16 U/L (ref 5–45)
BASOPHILS # BLD AUTO: 0.04 THOUSANDS/ΜL (ref 0–0.1)
BASOPHILS NFR BLD AUTO: 1 % (ref 0–1)
BILIRUB SERPL-MCNC: 0.34 MG/DL (ref 0.2–1)
BUN SERPL-MCNC: 16 MG/DL (ref 5–25)
CALCIUM SERPL-MCNC: 9.1 MG/DL (ref 8.3–10.1)
CHLORIDE SERPL-SCNC: 106 MMOL/L (ref 100–108)
CHOLEST SERPL-MCNC: 161 MG/DL (ref 50–200)
CO2 SERPL-SCNC: 27 MMOL/L (ref 21–32)
CREAT SERPL-MCNC: 0.9 MG/DL (ref 0.6–1.3)
EOSINOPHIL # BLD AUTO: 0.2 THOUSAND/ΜL (ref 0–0.61)
EOSINOPHIL NFR BLD AUTO: 3 % (ref 0–6)
ERYTHROCYTE [DISTWIDTH] IN BLOOD BY AUTOMATED COUNT: 12.6 % (ref 11.6–15.1)
GFR SERPL CREATININE-BSD FRML MDRD: 81 ML/MIN/1.73SQ M
GLUCOSE P FAST SERPL-MCNC: 91 MG/DL (ref 65–99)
HCT VFR BLD AUTO: 41.5 % (ref 34.8–46.1)
HDLC SERPL-MCNC: 43 MG/DL
HGB BLD-MCNC: 13.1 G/DL (ref 11.5–15.4)
IMM GRANULOCYTES # BLD AUTO: 0.01 THOUSAND/UL (ref 0–0.2)
IMM GRANULOCYTES NFR BLD AUTO: 0 % (ref 0–2)
LDLC SERPL CALC-MCNC: 88 MG/DL (ref 0–100)
LYMPHOCYTES # BLD AUTO: 2.2 THOUSANDS/ΜL (ref 0.6–4.47)
LYMPHOCYTES NFR BLD AUTO: 30 % (ref 14–44)
MCH RBC QN AUTO: 29 PG (ref 26.8–34.3)
MCHC RBC AUTO-ENTMCNC: 31.6 G/DL (ref 31.4–37.4)
MCV RBC AUTO: 92 FL (ref 82–98)
MONOCYTES # BLD AUTO: 0.51 THOUSAND/ΜL (ref 0.17–1.22)
MONOCYTES NFR BLD AUTO: 7 % (ref 4–12)
NEUTROPHILS # BLD AUTO: 4.27 THOUSANDS/ΜL (ref 1.85–7.62)
NEUTS SEG NFR BLD AUTO: 59 % (ref 43–75)
NONHDLC SERPL-MCNC: 118 MG/DL
NRBC BLD AUTO-RTO: 0 /100 WBCS
PLATELET # BLD AUTO: 216 THOUSANDS/UL (ref 149–390)
PMV BLD AUTO: 12.4 FL (ref 8.9–12.7)
POTASSIUM SERPL-SCNC: 4.8 MMOL/L (ref 3.5–5.3)
PROT SERPL-MCNC: 7.4 G/DL (ref 6.4–8.2)
RBC # BLD AUTO: 4.51 MILLION/UL (ref 3.81–5.12)
SODIUM SERPL-SCNC: 136 MMOL/L (ref 136–145)
TRIGL SERPL-MCNC: 150 MG/DL
TSH SERPL DL<=0.05 MIU/L-ACNC: 2.58 UIU/ML (ref 0.36–3.74)
WBC # BLD AUTO: 7.23 THOUSAND/UL (ref 4.31–10.16)

## 2021-04-20 PROCEDURE — 36415 COLL VENOUS BLD VENIPUNCTURE: CPT

## 2021-04-20 PROCEDURE — 80053 COMPREHEN METABOLIC PANEL: CPT | Performed by: NURSE PRACTITIONER

## 2021-04-20 PROCEDURE — 84443 ASSAY THYROID STIM HORMONE: CPT | Performed by: NURSE PRACTITIONER

## 2021-04-20 PROCEDURE — 85025 COMPLETE CBC W/AUTO DIFF WBC: CPT | Performed by: NURSE PRACTITIONER

## 2021-04-20 PROCEDURE — 80061 LIPID PANEL: CPT | Performed by: NURSE PRACTITIONER

## 2021-05-03 ENCOUNTER — OFFICE VISIT (OUTPATIENT)
Dept: INTERNAL MEDICINE CLINIC | Age: 36
End: 2021-05-03
Payer: COMMERCIAL

## 2021-05-03 VITALS
OXYGEN SATURATION: 98 % | SYSTOLIC BLOOD PRESSURE: 128 MMHG | TEMPERATURE: 98.2 F | HEIGHT: 63 IN | WEIGHT: 191 LBS | DIASTOLIC BLOOD PRESSURE: 64 MMHG | BODY MASS INDEX: 33.84 KG/M2 | HEART RATE: 94 BPM

## 2021-05-03 DIAGNOSIS — J01.80 ACUTE NON-RECURRENT SINUSITIS OF OTHER SINUS: ICD-10-CM

## 2021-05-03 DIAGNOSIS — J06.9 URTI (ACUTE UPPER RESPIRATORY INFECTION): Primary | ICD-10-CM

## 2021-05-03 PROCEDURE — 99213 OFFICE O/P EST LOW 20 MIN: CPT | Performed by: INTERNAL MEDICINE

## 2021-05-03 RX ORDER — FLUTICASONE PROPIONATE 50 MCG
1 SPRAY, SUSPENSION (ML) NASAL DAILY
Qty: 1 BOTTLE | Refills: 0 | Status: SHIPPED | OUTPATIENT
Start: 2021-05-03 | End: 2022-07-24

## 2021-05-03 RX ORDER — GUAIFENESIN 600 MG
600 TABLET, EXTENDED RELEASE 12 HR ORAL EVERY 12 HOURS SCHEDULED
Qty: 20 TABLET | Refills: 0 | Status: SHIPPED | OUTPATIENT
Start: 2021-05-03 | End: 2022-07-24

## 2021-05-03 RX ORDER — AMOXICILLIN AND CLAVULANATE POTASSIUM 875; 125 MG/1; MG/1
1 TABLET, FILM COATED ORAL EVERY 12 HOURS SCHEDULED
Qty: 14 TABLET | Refills: 0 | Status: SHIPPED | OUTPATIENT
Start: 2021-05-03 | End: 2021-05-10

## 2021-05-03 NOTE — LETTER
May 3, 2021     Patient: Tiffanie Fofana   YOB: 1985   Date of Visit: 5/3/2021       To Whom it May Concern:    Meeta Grimes is under my professional care  She was seen in my office on 5/3/2021  She may return to work on 05/05/21  If you have any questions or concerns, please don't hesitate to call           Sincerely,          Aby Jimenez DO        CC: No Recipients

## 2021-05-03 NOTE — PROGRESS NOTES
Assessment/Plan:    Upper respiratory tract infection with sinusitis  - likely viral with bacterial superinfection vs bacterial  - we will start pt on Augmentin 875-125 mg twice daily for 7 days, Flonase nasal spray for rhinitis and Mucinex for productive cough  - Pt was counseled to use OTC tylenol or ibuprofen with meals for aches and pains, warm salt water gargles for sore throat and was encouraged to drink lots of fluids, get plenty of rest and wash her hands liberally  - pt was also counseled to take antibiotics with food and also to use a probiotic like yogurt daily as long as she is taking antibiotics  -will give patient a work note for 2 days  - She should return to the clinic if her symptoms worsen or do not improve  Diagnoses and all orders for this visit:    URTI (acute upper respiratory infection)  -     amoxicillin-clavulanate (AUGMENTIN) 875-125 mg per tablet; Take 1 tablet by mouth every 12 (twelve) hours for 7 days  -     fluticasone (FLONASE) 50 mcg/act nasal spray; 1 spray into each nostril daily  -     guaiFENesin (MUCINEX) 600 mg 12 hr tablet; Take 1 tablet (600 mg total) by mouth every 12 (twelve) hours    Acute non-recurrent sinusitis of other sinus  -     amoxicillin-clavulanate (AUGMENTIN) 875-125 mg per tablet; Take 1 tablet by mouth every 12 (twelve) hours for 7 days  -     fluticasone (FLONASE) 50 mcg/act nasal spray; 1 spray into each nostril daily  -     guaiFENesin (MUCINEX) 600 mg 12 hr tablet; Take 1 tablet (600 mg total) by mouth every 12 (twelve) hours             Subjective:      Patient ID: Bayron Amador is a 43 y o  female  HPI  Patient presents with complaints of nasal congestion, head congestion, headaches, clogged sensation in her bilateral ears with mild earache bilaterally, postnasal drip, sinus pain and pressure, sore throat, subjective fever and cough productive of yellowish green phlegm that started about 6 days ago    She denies chills, night sweats, chest pain, shortness of breath, palpitations, nausea, vomiting abdominal pain, diarrhea, constipation, myalgias, arthralgias  Of note, she has a history of contact, her 2 little children were sick with similar symptoms and 1 of the boys has a doube ear infection and the other 1 has recovered  The boys were both tested for COVID-19 infection and were negative  The following portions of the patient's history were reviewed and updated as appropriate:   She  has a past medical history of Gastroenteritis, Hypothyroidism (2018), Plantar fasciitis, right, PONV (postoperative nausea and vomiting), Seizures ([address] ), Thyroid disease, and Viral gastroenteritis  She   Patient Active Problem List    Diagnosis Date Noted    URTI (acute upper respiratory infection) 2021    Other acute sinusitis 2021    Vitamin D deficiency 2021    COVID-19 2020    Annual physical exam 2020    Pain of left heel 2020    S/P repeat low transverse  2018    S/P tubal ligation 2018    Nausea 2018    Ganglion cyst of volar aspect of left wrist 03/15/2018    Hypothyroidism 2018    GERD (gastroesophageal reflux disease) 2018    Seizure disorder ([facility] [address] ) 2014    Mild dysplasia of cervix ([place]) 2009     She  has a past surgical history that includes  section; Mouth surgery (Bilateral, ); [place] tooth extraction; pr excis primary ganglion wrist (Left, 3/15/2018); pr  delivery only (N/A, 2018); and Tubal ligation (Bilateral, 2018)  Her family history includes Cancer in her maternal grandfather and maternal grandmother; Diabetes in her paternal grandmother; No Known Problems in her father, mother, and paternal grandfather; Skin cancer in her maternal grandfather  She  reports that she has never smoked  She has never used smokeless tobacco  She reports current alcohol use of about 1 0 standard drinks of alcohol per week  She reports that she does not use drugs  Current Outpatient Medications   Medication Sig Dispense Refill    ergocalciferol (VITAMIN D2) 50,000 units Take 1 capsule (50,000 Units total) by mouth once a week 12 capsule 3    levothyroxine 50 mcg tablet Take 1 tablet (50 mcg total) by mouth daily 90 tablet 1    amoxicillin-clavulanate (AUGMENTIN) 875-125 mg per tablet Take 1 tablet by mouth every 12 (twelve) hours for 7 days 14 tablet 0    fluticasone (FLONASE) 50 mcg/act nasal spray 1 spray into each nostril daily 1 Bottle 0    guaiFENesin (MUCINEX) 600 mg 12 hr tablet Take 1 tablet (600 mg total) by mouth every 12 (twelve) hours 20 tablet 0    meloxicam (MOBIC) 15 mg tablet Take 1 tablet (15 mg total) by mouth daily (Patient not taking: Reported on 4/14/2021) 30 tablet 1    valACYclovir (VALTREX) 1,000 mg tablet Take 2 tablets (2,000 mg total) by mouth 2 (two) times a day for 1 day 4 tablet 1     No current facility-administered medications for this visit  Current Outpatient Medications on File Prior to Visit   Medication Sig    ergocalciferol (VITAMIN D2) 50,000 units Take 1 capsule (50,000 Units total) by mouth once a week    levothyroxine 50 mcg tablet Take 1 tablet (50 mcg total) by mouth daily    meloxicam (MOBIC) 15 mg tablet Take 1 tablet (15 mg total) by mouth daily (Patient not taking: Reported on 4/14/2021)    valACYclovir (VALTREX) 1,000 mg tablet Take 2 tablets (2,000 mg total) by mouth 2 (two) times a day for 1 day     No current facility-administered medications on file prior to visit  She is allergic to seasonal ic [cholestatin]       Review of Systems   Constitutional: Positive for fatigue and fever (low grade fever on one day)  Negative for activity change, chills and unexpected weight change     HENT: Positive for congestion, ear pain (mild ear ache b/l  wt a feeling of fluid in the ears), postnasal drip, sinus pressure, sinus pain and sore throat (for the past 6 days with a hx of contact)  Negative for rhinorrhea  Eyes: Negative for pain  Respiratory: Positive for cough (productive of greenish yellow phlegm)  Negative for choking, chest tightness, shortness of breath and wheezing  Cardiovascular: Negative for chest pain, palpitations and leg swelling  Gastrointestinal: Negative for abdominal pain, constipation, diarrhea, nausea and vomiting  Genitourinary: Negative for dysuria and hematuria  Musculoskeletal: Negative for arthralgias, back pain, gait problem, joint swelling, myalgias and neck stiffness  Skin: Negative for pallor and rash  Neurological: Positive for headaches  Negative for dizziness, tremors, seizures, syncope and light-headedness  Hematological: Negative for adenopathy  Psychiatric/Behavioral: Negative for behavioral problems  Objective:      /64 (BP Location: Left arm, Patient Position: Sitting, Cuff Size: Standard)   Pulse 94   Temp 98 2 °F (36 8 °C) (Temporal)   Ht 5' 3" (1 6 m) Comment: shoes on  Wt 86 6 kg (191 lb) Comment: shoes on  SpO2 98%   BMI 33 83 kg/m²          Physical Exam  Constitutional:       General: She is not in acute distress  Appearance: She is well-developed  She is not diaphoretic  HENT:      Head: Normocephalic and atraumatic  Right Ear: External ear normal  Tympanic membrane is erythematous  Left Ear: External ear normal  Tympanic membrane is erythematous ( erythema of the bilateral external auditory canal with tympanic membranes)  Nose: Mucosal edema and congestion present  Right Sinus: Maxillary sinus tenderness and frontal sinus tenderness present  Left Sinus: Maxillary sinus tenderness and frontal sinus tenderness present  Mouth/Throat:      Mouth: Mucous membranes are dry  Pharynx: Posterior oropharyngeal erythema (Oropharyngeal erythema with dry mucous Mbs) present  No oropharyngeal exudate  Eyes:      General: No scleral icterus          Right eye: No discharge  Left eye: No discharge  Conjunctiva/sclera: Conjunctivae normal       Pupils: Pupils are equal, round, and reactive to light  Neck:      Musculoskeletal: Normal range of motion and neck supple  Thyroid: No thyromegaly  Vascular: No JVD  Trachea: No tracheal deviation  Cardiovascular:      Rate and Rhythm: Normal rate and regular rhythm  Heart sounds: Normal heart sounds  No murmur  No friction rub  No gallop  Pulmonary:      Effort: Pulmonary effort is normal  No respiratory distress  Breath sounds: Normal breath sounds  No wheezing or rales  Chest:      Chest wall: No tenderness  Abdominal:      General: Bowel sounds are normal  There is no distension  Palpations: Abdomen is soft  There is no mass  Tenderness: There is no abdominal tenderness  There is no guarding or rebound  Musculoskeletal: Normal range of motion  General: No tenderness or deformity  Lymphadenopathy:      Head:      Left side of head: Submandibular ( submandibular lymphadenopathy, tender on her left) adenopathy present  Cervical: No cervical adenopathy  Skin:     General: Skin is warm and dry  Coloration: Skin is not pale  Findings: No erythema or rash  Neurological:      Mental Status: She is alert and oriented to person, place, and time  Cranial Nerves: No cranial nerve deficit  Motor: No abnormal muscle tone  Coordination: Coordination normal       Deep Tendon Reflexes: Reflexes are normal and symmetric     Psychiatric:         Behavior: Behavior normal            Clinical Support on 04/20/2021   Component Date Value Ref Range Status    TSH 3RD GENERATON 04/20/2021 2 580  0 358 - 3 740 uIU/mL Final    The recommended reference ranges for TSH during pregnancy are as follows:   First trimester 0 1 to 2 5 uIU/mL   Second trimester  0 2 to 3 0 uIU/mL   Third trimester 0 3 to 3 0 uIU/m    Note: Normal ranges may not apply to patients who are transgender, non-binary, or whose legal sex, sex at birth, and gender identity differ   Sodium 04/20/2021 136  136 - 145 mmol/L Final    Potassium 04/20/2021 4 8  3 5 - 5 3 mmol/L Final    Chloride 04/20/2021 106  100 - 108 mmol/L Final    CO2 04/20/2021 27  21 - 32 mmol/L Final    ANION GAP 04/20/2021 3* 4 - 13 mmol/L Final    BUN 04/20/2021 16  5 - 25 mg/dL Final    Creatinine 04/20/2021 0 90  0 60 - 1 30 mg/dL Final    Standardized to IDMS reference method    Glucose, Fasting 04/20/2021 91  65 - 99 mg/dL Final    Specimen collection should occur prior to Sulfasalazine administration due to the potential for falsely depressed results  Specimen collection should occur prior to Sulfapyridine administration due to the potential for falsely elevated results   Calcium 04/20/2021 9 1  8 3 - 10 1 mg/dL Final    AST 04/20/2021 16  5 - 45 U/L Final    Specimen collection should occur prior to Sulfasalazine administration due to the potential for falsely depressed results   ALT 04/20/2021 32  12 - 78 U/L Final    Specimen collection should occur prior to Sulfasalazine and/or Sulfapyridine administration due to the potential for falsely depressed results   Alkaline Phosphatase 04/20/2021 68  46 - 116 U/L Final    Total Protein 04/20/2021 7 4  6 4 - 8 2 g/dL Final    Albumin 04/20/2021 3 9  3 5 - 5 0 g/dL Final    Total Bilirubin 04/20/2021 0 34  0 20 - 1 00 mg/dL Final    Use of this assay is not recommended for patients undergoing treatment with eltrombopag due to the potential for falsely elevated results      eGFR 04/20/2021 81  ml/min/1 73sq m Final    WBC 04/20/2021 7 23  4 31 - 10 16 Thousand/uL Final    RBC 04/20/2021 4 51  3 81 - 5 12 Million/uL Final    Hemoglobin 04/20/2021 13 1  11 5 - 15 4 g/dL Final    Hematocrit 04/20/2021 41 5  34 8 - 46 1 % Final    MCV 04/20/2021 92  82 - 98 fL Final    MCH 04/20/2021 29 0  26 8 - 34 3 pg Final    MCHC 04/20/2021 31 6  31 4 - 37 4 g/dL Final    RDW 04/20/2021 12 6  11 6 - 15 1 % Final    MPV 04/20/2021 12 4  8 9 - 12 7 fL Final    Platelets 89/99/2686 216  149 - 390 Thousands/uL Final    nRBC 04/20/2021 0  /100 WBCs Final    Neutrophils Relative 04/20/2021 59  43 - 75 % Final    Immat GRANS % 04/20/2021 0  0 - 2 % Final    Lymphocytes Relative 04/20/2021 30  14 - 44 % Final    Monocytes Relative 04/20/2021 7  4 - 12 % Final    Eosinophils Relative 04/20/2021 3  0 - 6 % Final    Basophils Relative 04/20/2021 1  0 - 1 % Final    Neutrophils Absolute 04/20/2021 4 27  1 85 - 7 62 Thousands/µL Final    Immature Grans Absolute 04/20/2021 0 01  0 00 - 0 20 Thousand/uL Final    Lymphocytes Absolute 04/20/2021 2 20  0 60 - 4 47 Thousands/µL Final    Monocytes Absolute 04/20/2021 0 51  0 17 - 1 22 Thousand/µL Final    Eosinophils Absolute 04/20/2021 0 20  0 00 - 0 61 Thousand/µL Final    Basophils Absolute 04/20/2021 0 04  0 00 - 0 10 Thousands/µL Final    Cholesterol 04/20/2021 161  50 - 200 mg/dL Final    Cholesterol:       Desirable         <200 mg/dl       Borderline         200-239 mg/dl       High              >239           Triglycerides 04/20/2021 150  <=150 mg/dL Final    Triglyceride:     Normal          <150 mg/dl     Borderline High 150-199 mg/dl     High            200-499 mg/dl        Very High       >499 mg/dl    Specimen collection should occur prior to N-Acetylcysteine or Metamizole administration due to the potential for falsely depressed results   HDL, Direct 04/20/2021 43  >=40 mg/dL Final    HDL Cholesterol:       Low     <41 mg/dL  Specimen collection should occur prior to Metamizole administration due to the potential for falsley depressed results      LDL Calculated 04/20/2021 88  0 - 100 mg/dL Final    LDL Cholesterol:     Optimal           <100 mg/dl     Near Optimal      100-129 mg/dl     Above Optimal       Borderline High 130-159 mg/dl       High            160-189 mg/dl       Very High       >189 mg/dl         This screening LDL is a calculated result  It does not have the accuracy of the Direct Measured LDL in the monitoring of patients with hyperlipidemia and/or statin therapy  Direct Measure LDL ([id number]) must be ordered separately in these patients   Non-HDL-Chol (CHOL-HDL) 04/20/2021 118  mg/dl Final   Clinical Support on 11/12/2020   Component Date Value Ref Range Status    Vit D, 25-Hydroxy 11/12/2020 25 9* 30 0 - 100 0 ng/mL Final    TSH 3RD GENERATON 11/12/2020 5 120* 0 358 - 3 740 uIU/mL Final    The recommended reference ranges for TSH during pregnancy are as follows:   First trimester 0 1 to 2 5 uIU/mL   Second trimester  0 2 to 3 0 uIU/mL   Third trimester 0 3 to 3 0 uIU/m    Note: Normal ranges may not apply to patients who are transgender, non-binary, or whose legal sex, sex at birth, and gender identity differ  Using supplements with high doses of biotin 20 to more than 300 times greater than the adequate daily intake for adults of 30 mcg/day as established by the [place] [employer], can cause falsely depress results   Free T4 11/12/2020 0 75* 0 76 - 1 46 ng/dL Final    Specimen collection should occur prior to Sulfasalazine administration due to the potential for falsely elevated results     Clinical Support on 09/29/2020   Component Date Value Ref Range Status    WBC 09/29/2020 9 26  4 31 - 10 16 Thousand/uL Final    RBC 09/29/2020 4 41  3 81 - 5 12 Million/uL Final    Hemoglobin 09/29/2020 13 1  11 5 - 15 4 g/dL Final    Hematocrit 09/29/2020 40 7  34 8 - 46 1 % Final    MCV 09/29/2020 92  82 - 98 fL Final    MCH 09/29/2020 29 7  26 8 - 34 3 pg Final    MCHC 09/29/2020 32 2  31 4 - 37 4 g/dL Final    RDW 09/29/2020 12 6  11 6 - 15 1 % Final    MPV 09/29/2020 12 0  8 9 - 12 7 fL Final    Platelets 37/27/1431 196  149 - 390 Thousands/uL Final    nRBC 09/29/2020 0  /100 WBCs Final    Neutrophils Relative 09/29/2020 66  43 - 75 % Final    Immat GRANS % 09/29/2020 0 0 - 2 % Final    Lymphocytes Relative 09/29/2020 26  14 - 44 % Final    Monocytes Relative 09/29/2020 6  4 - 12 % Final    Eosinophils Relative 09/29/2020 1  0 - 6 % Final    Basophils Relative 09/29/2020 1  0 - 1 % Final    Neutrophils Absolute 09/29/2020 6 08  1 85 - 7 62 Thousands/µL Final    Immature Grans Absolute 09/29/2020 0 02  0 00 - 0 20 Thousand/uL Final    Lymphocytes Absolute 09/29/2020 2 44  0 60 - 4 47 Thousands/µL Final    Monocytes Absolute 09/29/2020 0 57  0 17 - 1 22 Thousand/µL Final    Eosinophils Absolute 09/29/2020 0 10  0 00 - 0 61 Thousand/µL Final    Basophils Absolute 09/29/2020 0 05  0 00 - 0 10 Thousands/µL Final    Cholesterol 09/29/2020 169  50 - 200 mg/dL Final    Cholesterol:       Desirable         <200 mg/dl       Borderline         200-239 mg/dl       High              >239           Triglycerides 09/29/2020 87  <=150 mg/dL Final    Triglyceride:     Normal          <150 mg/dl     Borderline High 150-199 mg/dl     High            200-499 mg/dl        Very High       >499 mg/dl    Specimen collection should occur prior to N-Acetylcysteine or Metamizole administration due to the potential for falsely depressed results   HDL, Direct 09/29/2020 48  >=40 mg/dL Final    HDL Cholesterol:       Low     <41 mg/dL  Specimen collection should occur prior to Metamizole administration due to the potential for falsley depressed results   LDL Calculated 09/29/2020 104* 0 - 100 mg/dL Final    LDL Cholesterol:     Optimal           <100 mg/dl     Near Optimal      100-129 mg/dl     Above Optimal       Borderline High 130-159 mg/dl       High            160-189 mg/dl       Very High       >189 mg/dl         This screening LDL is a calculated result  It does not have the accuracy of the Direct Measured LDL in the monitoring of patients with hyperlipidemia and/or statin therapy  Direct Measure LDL ([id number]) must be ordered separately in these patients      Non-HDL-Chol (CHOL-HDL) 09/29/2020 121  mg/dl Final    TSH 3RD GENERATON 09/29/2020 5 450* 0 358 - 3 740 uIU/mL Final    The recommended reference ranges for TSH during pregnancy are as follows:   First trimester 0 1 to 2 5 uIU/mL   Second trimester  0 2 to 3 0 uIU/mL   Third trimester 0 3 to 3 0 uIU/m    Note: Normal ranges may not apply to patients who are transgender, non-binary, or whose legal sex, sex at birth, and gender identity differ  Using supplements with high doses of biotin 20 to more than 300 times greater than the adequate daily intake for adults of 30 mcg/day as established by the [place] [employer], can cause falsely depress results   Hemoglobin A1C 09/29/2020 5 4  Normal 3 8-5 6%; PreDiabetic 5 7-6 4%; Diabetic >=6 5%; Glycemic control for adults with diabetes <7 0% % Final    EAG 09/29/2020 108  mg/dl Final    Sodium 09/29/2020 139  136 - 145 mmol/L Final    Potassium 09/29/2020 5 0  3 5 - 5 3 mmol/L Final    Chloride 09/29/2020 106  100 - 108 mmol/L Final    CO2 09/29/2020 28  21 - 32 mmol/L Final    ANION GAP 09/29/2020 5  4 - 13 mmol/L Final    BUN 09/29/2020 18  5 - 25 mg/dL Final    Creatinine 09/29/2020 0 88  0 60 - 1 30 mg/dL Final    Standardized to IDMS reference method    Glucose, Fasting 09/29/2020 93  65 - 99 mg/dL Final    Specimen collection should occur prior to Sulfasalazine administration due to the potential for falsely depressed results  Specimen collection should occur prior to Sulfapyridine administration due to the potential for falsely elevated results   Calcium 09/29/2020 9 5  8 3 - 10 1 mg/dL Final    AST 09/29/2020 20  5 - 45 U/L Final    Specimen collection should occur prior to Sulfasalazine administration due to the potential for falsely depressed results   ALT 09/29/2020 33  12 - 78 U/L Final    Specimen collection should occur prior to Sulfasalazine and/or Sulfapyridine administration due to the potential for falsely depressed results       Alkaline Phosphatase 09/29/2020 79  46 - 116 U/L Final    Total Protein 09/29/2020 7 7  6 4 - 8 2 g/dL Final    Albumin 09/29/2020 4 1  3 5 - 5 0 g/dL Final    Total Bilirubin 09/29/2020 0 28  0 20 - 1 00 mg/dL Final    Use of this assay is not recommended for patients undergoing treatment with eltrombopag due to the potential for falsely elevated results   eGFR 09/29/2020 84  ml/min/1 73sq m Final    Free T4 09/29/2020 0 79  0 76 - 1 46 ng/dL Final    Specimen collection should occur prior to Sulfasalazine administration due to the potential for falsely elevated results

## 2021-05-23 DIAGNOSIS — J01.80 ACUTE NON-RECURRENT SINUSITIS OF OTHER SINUS: ICD-10-CM

## 2021-05-23 DIAGNOSIS — J06.9 URTI (ACUTE UPPER RESPIRATORY INFECTION): ICD-10-CM

## 2021-05-23 RX ORDER — FLUTICASONE PROPIONATE 50 MCG
SPRAY, SUSPENSION (ML) NASAL
Qty: 16 ML | OUTPATIENT
Start: 2021-05-23

## 2021-08-23 DIAGNOSIS — E03.9 HYPOTHYROIDISM, UNSPECIFIED TYPE: ICD-10-CM

## 2021-08-23 RX ORDER — LEVOTHYROXINE SODIUM 0.05 MG/1
50 TABLET ORAL DAILY
Qty: 90 TABLET | Refills: 1 | Status: SHIPPED | OUTPATIENT
Start: 2021-08-23 | End: 2022-03-16 | Stop reason: SDUPTHER

## 2021-10-06 ENCOUNTER — OFFICE VISIT (OUTPATIENT)
Dept: INTERNAL MEDICINE CLINIC | Facility: CLINIC | Age: 36
End: 2021-10-06
Payer: COMMERCIAL

## 2021-10-06 VITALS
BODY MASS INDEX: 32.04 KG/M2 | HEART RATE: 80 BPM | HEIGHT: 63 IN | WEIGHT: 180.8 LBS | DIASTOLIC BLOOD PRESSURE: 86 MMHG | SYSTOLIC BLOOD PRESSURE: 126 MMHG | OXYGEN SATURATION: 97 % | TEMPERATURE: 97.9 F

## 2021-10-06 DIAGNOSIS — E03.9 HYPOTHYROIDISM, UNSPECIFIED TYPE: Primary | ICD-10-CM

## 2021-10-06 DIAGNOSIS — Z00.00 ANNUAL PHYSICAL EXAM: ICD-10-CM

## 2021-10-06 PROCEDURE — 99395 PREV VISIT EST AGE 18-39: CPT | Performed by: NURSE PRACTITIONER

## 2022-03-08 ENCOUNTER — APPOINTMENT (OUTPATIENT)
Dept: LAB | Facility: IMAGING CENTER | Age: 37
End: 2022-03-08
Payer: COMMERCIAL

## 2022-03-08 LAB — TSH SERPL DL<=0.05 MIU/L-ACNC: 1.7 UIU/ML (ref 0.36–3.74)

## 2022-03-08 PROCEDURE — 84443 ASSAY THYROID STIM HORMONE: CPT | Performed by: NURSE PRACTITIONER

## 2022-03-08 PROCEDURE — 36415 COLL VENOUS BLD VENIPUNCTURE: CPT | Performed by: NURSE PRACTITIONER

## 2022-03-16 DIAGNOSIS — E03.9 HYPOTHYROIDISM, UNSPECIFIED TYPE: ICD-10-CM

## 2022-03-16 RX ORDER — LEVOTHYROXINE SODIUM 0.05 MG/1
50 TABLET ORAL DAILY
Qty: 90 TABLET | Refills: 1 | Status: SHIPPED | OUTPATIENT
Start: 2022-03-16

## 2022-04-18 DIAGNOSIS — E55.9 VITAMIN D DEFICIENCY: ICD-10-CM

## 2022-04-18 RX ORDER — ERGOCALCIFEROL 1.25 MG/1
50000 CAPSULE ORAL WEEKLY
Qty: 12 CAPSULE | Refills: 3 | Status: SHIPPED | OUTPATIENT
Start: 2022-04-18

## 2022-05-09 ENCOUNTER — OFFICE VISIT (OUTPATIENT)
Dept: INTERNAL MEDICINE CLINIC | Facility: CLINIC | Age: 37
End: 2022-05-09
Payer: COMMERCIAL

## 2022-05-09 VITALS
OXYGEN SATURATION: 99 % | HEART RATE: 78 BPM | WEIGHT: 180 LBS | BODY MASS INDEX: 31.89 KG/M2 | HEIGHT: 63 IN | TEMPERATURE: 97.8 F | DIASTOLIC BLOOD PRESSURE: 78 MMHG | SYSTOLIC BLOOD PRESSURE: 122 MMHG

## 2022-05-09 DIAGNOSIS — J01.80 ACUTE NON-RECURRENT SINUSITIS OF OTHER SINUS: Primary | ICD-10-CM

## 2022-05-09 LAB
SARS-COV-2 AG UPPER RESP QL IA: NEGATIVE
VALID CONTROL: NORMAL

## 2022-05-09 PROCEDURE — 99214 OFFICE O/P EST MOD 30 MIN: CPT | Performed by: NURSE PRACTITIONER

## 2022-05-09 PROCEDURE — 87811 SARS-COV-2 COVID19 W/OPTIC: CPT | Performed by: NURSE PRACTITIONER

## 2022-05-09 RX ORDER — AMOXICILLIN AND CLAVULANATE POTASSIUM 875; 125 MG/1; MG/1
1 TABLET, FILM COATED ORAL EVERY 12 HOURS SCHEDULED
Qty: 20 TABLET | Refills: 0 | Status: SHIPPED | OUTPATIENT
Start: 2022-05-09 | End: 2022-05-19

## 2022-05-09 RX ORDER — CETIRIZINE HYDROCHLORIDE 10 MG/1
10 TABLET ORAL DAILY
Qty: 90 TABLET | Refills: 0 | Status: SHIPPED | OUTPATIENT
Start: 2022-05-09

## 2022-05-09 RX ORDER — PREDNISONE 20 MG/1
40 TABLET ORAL DAILY
Qty: 10 TABLET | Refills: 0 | Status: SHIPPED | OUTPATIENT
Start: 2022-05-09 | End: 2022-05-14

## 2022-05-09 RX ORDER — FLUTICASONE PROPIONATE 50 MCG
1 SPRAY, SUSPENSION (ML) NASAL DAILY
Qty: 18.2 ML | Refills: 0 | Status: SHIPPED | OUTPATIENT
Start: 2022-05-09

## 2022-05-09 NOTE — LETTER
May 9, 2022     Patient: Samira Potts  YOB: 1985  Date of Visit: 5/9/2022      To Whom it May Concern:    Lukasz Lemus is under my professional care  Ivanamatilde Baltazar was seen in my office on 5/9/2022  Gerri Baltazar may return to work on 5/11/22  If you have any questions or concerns, please don't hesitate to call           Sincerely,          HARSHA De Luna        CC: No Recipients

## 2022-05-09 NOTE — PROGRESS NOTES
Assessment/Plan:    Other acute sinusitis  Will start Augmentin, if no relief of symptoms advised patient she may start prednisone, start Zyrtec and Flonase  Rest and fluids advised  Educated that the course of this illness could be 2-4 weeks  Discussed symptomatic relief, such as warm steam inhalations, tylenol/ibuprofen for fevers and body aches, rest, and drink plenty of fluids  Warm salt gargles for sore throat  Discussed red flag signs to go to the ER, such as chest pain or shortness of breath  Return to the office for reevaluation if symptoms worsen or do not improve in 1-2 weeks               Diagnoses and all orders for this visit:    Acute non-recurrent sinusitis of other sinus  -     amoxicillin-clavulanate (Augmentin) 875-125 mg per tablet; Take 1 tablet by mouth every 12 (twelve) hours for 10 days  -     predniSONE 20 mg tablet; Take 2 tablets (40 mg total) by mouth daily for 5 days  -     fluticasone (FLONASE) 50 mcg/act nasal spray; 1 spray into each nostril daily  -     cetirizine (ZyrTEC) 10 mg tablet; Take 1 tablet (10 mg total) by mouth daily          Subjective:      Patient ID: Kimberly Rouse is a 42 y o  female  Patient presents today with cold like sympotms  She reports her son was sick with similar symptoms  Home COVID test negative  Had flu and Covid vaccinations  URI   This is a new problem  Episode onset: Thursday  The problem has been gradually worsening  There has been no fever  Associated symptoms include congestion, ear pain, headaches, a plugged ear sensation, rhinorrhea, sinus pain and a sore throat  Pertinent negatives include no abdominal pain, chest pain, coughing, diarrhea, dysuria, nausea, neck pain, rash, vomiting or wheezing  Treatments tried: tylenol cold and sinus  The treatment provided no relief         The following portions of the patient's history were reviewed and updated as appropriate: allergies, current medications, past family history, past medical history, past social history, past surgical history and problem list     Review of Systems   Constitutional: Negative for activity change, appetite change, chills, diaphoresis and fever  HENT: Positive for congestion, ear pain, rhinorrhea, sinus pain and sore throat  Negative for ear discharge, postnasal drip and sinus pressure  Eyes: Negative for pain, discharge, itching and visual disturbance  Respiratory: Negative for cough, chest tightness, shortness of breath and wheezing  Cardiovascular: Negative for chest pain, palpitations and leg swelling  Gastrointestinal: Negative for abdominal pain, constipation, diarrhea, nausea and vomiting  Endocrine: Negative for polydipsia, polyphagia and polyuria  Genitourinary: Negative for difficulty urinating, dysuria and urgency  Musculoskeletal: Negative for arthralgias, back pain and neck pain  Skin: Negative for rash and wound  Allergic/Immunologic: Positive for environmental allergies  Neurological: Positive for headaches  Negative for dizziness, weakness and numbness           Past Medical History:   Diagnosis Date    Gastroenteritis     last assessed: 08/04/16    Hypothyroidism 01/25/2018    Plantar fasciitis, right     last assessed: 04/16/15    PONV (postoperative nausea and vomiting)     Seizures (HCC)     last assessed: 08/15/17    Thyroid disease     last assessed: 08/15/17    Viral gastroenteritis     last assessed: 03/27/17         Current Outpatient Medications:     ergocalciferol (VITAMIN D2) 50,000 units, Take 1 capsule (50,000 Units total) by mouth once a week, Disp: 12 capsule, Rfl: 3    levothyroxine 50 mcg tablet, Take 1 tablet (50 mcg total) by mouth daily, Disp: 90 tablet, Rfl: 1    amoxicillin-clavulanate (Augmentin) 875-125 mg per tablet, Take 1 tablet by mouth every 12 (twelve) hours for 10 days, Disp: 20 tablet, Rfl: 0    cetirizine (ZyrTEC) 10 mg tablet, Take 1 tablet (10 mg total) by mouth daily, Disp: 90 tablet, Rfl: 0    fluticasone (FLONASE) 50 mcg/act nasal spray, 1 spray into each nostril daily, Disp: 1 Bottle, Rfl: 0    fluticasone (FLONASE) 50 mcg/act nasal spray, 1 spray into each nostril daily, Disp: 18 2 mL, Rfl: 0    guaiFENesin (MUCINEX) 600 mg 12 hr tablet, Take 1 tablet (600 mg total) by mouth every 12 (twelve) hours (Patient not taking: Reported on 10/6/2021), Disp: 20 tablet, Rfl: 0    meloxicam (MOBIC) 15 mg tablet, Take 1 tablet (15 mg total) by mouth daily (Patient not taking: Reported on 2021), Disp: 30 tablet, Rfl: 1    predniSONE 20 mg tablet, Take 2 tablets (40 mg total) by mouth daily for 5 days, Disp: 10 tablet, Rfl: 0    valACYclovir (VALTREX) 1,000 mg tablet, Take 2 tablets (2,000 mg total) by mouth 2 (two) times a day for 1 day (Patient not taking: Reported on 10/6/2021), Disp: 4 tablet, Rfl: 1    Allergies   Allergen Reactions    Seasonal Ic [Cholestatin] Itching     Itchy eyes, congestion       Social History   Past Surgical History:   Procedure Laterality Date     SECTION      MOUTH SURGERY Bilateral     [place]  DELIVERY ONLY N/A 2018    Procedure:  SECTION () REPEAT;  Surgeon: Lorenzo Pinedo DO;  [facility];  Service: Obstetrics    [place] EXCIS PRIMARY GANGLION WRIST Left 3/15/2018    Procedure: VOLAR WRIST GANGLION CYST EXCISION;  Surgeon: Trey Sanchez MD;  Location: [facility] OR;  Service: Orthopedics    TUBAL LIGATION Bilateral 2018    Procedure: LIGATION/COAGULATION TUBAL;  Surgeon: Lorenzo Pinedo DO;  [place]: [facility] ;  Service: Obstetrics    WISDOM TOOTH EXTRACTION       Family History   Problem Relation Age of Onset    Cancer Maternal Grandmother     Cancer Maternal Grandfather     Skin cancer Maternal Grandfather     Diabetes Paternal Grandmother     No Known Problems Paternal Grandfather     No Known Problems Mother     No Known Problems Father        Objective:  /78 (BP Location: Left arm, Patient Position: Sitting, Cuff Size: Adult)   Pulse 78   Temp 97 8 °F (36 6 °C) (Temporal)   Ht 5' 3 25" (1 607 m)   Wt 81 6 kg (180 lb)   SpO2 99%   BMI 31 63 kg/m²     No results found for this or any previous visit (from the past 1344 hour(s))  Physical Exam  Constitutional:       General: She is not in acute distress  Appearance: She is well-developed  She is not diaphoretic  HENT:      Head: Normocephalic and atraumatic  Right Ear: External ear normal  A middle ear effusion is present  Tympanic membrane is not erythematous  Left Ear: External ear normal  A middle ear effusion is present  Tympanic membrane is not erythematous  Nose:      Right Sinus: Maxillary sinus tenderness and frontal sinus tenderness present  Left Sinus: Maxillary sinus tenderness and frontal sinus tenderness present  Mouth/Throat:      Pharynx: Posterior oropharyngeal erythema present  No oropharyngeal exudate  Tonsils: No tonsillar exudate  Eyes:      General:         Right eye: No discharge  Left eye: No discharge  Conjunctiva/sclera: Conjunctivae normal       Pupils: Pupils are equal, round, and reactive to light  Neck:      Thyroid: No thyromegaly  Cardiovascular:      Rate and Rhythm: Normal rate and regular rhythm  Heart sounds: Normal heart sounds  No murmur heard  No friction rub  No gallop  Pulmonary:      Effort: Pulmonary effort is normal  No respiratory distress  Breath sounds: Normal breath sounds  No stridor  No wheezing or rales  Abdominal:      General: Bowel sounds are normal  There is no distension  Palpations: Abdomen is soft  Tenderness: There is no abdominal tenderness  Musculoskeletal:      Cervical back: Normal range of motion and neck supple  Lymphadenopathy:      Cervical: No cervical adenopathy  Skin:     General: Skin is warm and dry  Findings: No erythema or rash     Neurological:      Mental Status: She is alert and oriented to person, place, and time  Psychiatric:         Behavior: Behavior normal          Thought Content:  Thought content normal          Judgment: Judgment normal

## 2022-05-09 NOTE — ASSESSMENT & PLAN NOTE
Will start Augmentin, if no relief of symptoms advised patient she may start prednisone, start Zyrtec and Flonase  Rest and fluids advised  Educated that the course of this illness could be 2-4 weeks  Discussed symptomatic relief, such as warm steam inhalations, tylenol/ibuprofen for fevers and body aches, rest, and drink plenty of fluids  Warm salt gargles for sore throat  Discussed red flag signs to go to the ER, such as chest pain or shortness of breath     Return to the office for reevaluation if symptoms worsen or do not improve in 1-2 weeks

## 2022-07-24 ENCOUNTER — OFFICE VISIT (OUTPATIENT)
Dept: URGENT CARE | Age: 37
End: 2022-07-24
Payer: COMMERCIAL

## 2022-07-24 VITALS
SYSTOLIC BLOOD PRESSURE: 126 MMHG | DIASTOLIC BLOOD PRESSURE: 84 MMHG | BODY MASS INDEX: 31.54 KG/M2 | HEIGHT: 63 IN | RESPIRATION RATE: 18 BRPM | HEART RATE: 85 BPM | WEIGHT: 178 LBS | OXYGEN SATURATION: 99 % | TEMPERATURE: 99.3 F

## 2022-07-24 DIAGNOSIS — N63.0 BREAST MASS IN FEMALE: ICD-10-CM

## 2022-07-24 DIAGNOSIS — N64.4 BREAST PAIN: Primary | ICD-10-CM

## 2022-07-24 PROCEDURE — 99213 OFFICE O/P EST LOW 20 MIN: CPT | Performed by: PHYSICIAN ASSISTANT

## 2022-07-24 NOTE — PROGRESS NOTES
[facility]        NAME: Joseph Adan is a 42 y o  female  : 1985    MRN: [id number]  DATE: 2022  TIME: 1:52 PM    Assessment and Plan   Breast pain [N64 4]  1  Breast pain  Mammo diagnostic right w cad       Patient Instructions     Patient will call ob-gyn tomorrow to establish emergent visit and begin workup  I ordered diagnostic mammo in hopes to expedite this workup  Follow up with PCP in 3-5 days  Proceed to  ER if symptoms worsen  Chief Complaint     Chief Complaint   Patient presents with    Breast Pain     Left breast pain/swelling x 3 days     History of Present Illness     40yo F presents with right sided breast pain and swelling x 3 days  Patient first noticed breast pain while at rest   Pain is not exacerbated by any movement  She denies any pain or lumps in axilla  Patient denies fever, chills, n/v/d, sob/wheezing, skin redness, skin rash, skin changes, nipple discharge  Review of Systems   Review of Systems   Constitutional: Negative for activity change, appetite change, chills, diaphoresis, fatigue and fever  Respiratory: Negative for cough, chest tightness, shortness of breath and wheezing  Cardiovascular: Negative for chest pain           Current Medications       Current Outpatient Medications:     cetirizine (ZyrTEC) 10 mg tablet, Take 1 tablet (10 mg total) by mouth daily, Disp: 90 tablet, Rfl: 0    ergocalciferol (VITAMIN D2) 50,000 units, Take 1 capsule (50,000 Units total) by mouth once a week, Disp: 12 capsule, Rfl: 3    levothyroxine 50 mcg tablet, Take 1 tablet (50 mcg total) by mouth daily, Disp: 90 tablet, Rfl: 1    fluticasone (FLONASE) 50 mcg/act nasal spray, 1 spray into each nostril daily, Disp: 1 Bottle, Rfl: 0    fluticasone (FLONASE) 50 mcg/act nasal spray, 1 spray into each nostril daily (Patient not taking: Reported on 2022), Disp: 18 2 mL, Rfl: 0    guaiFENesin (MUCINEX) 600 mg 12 hr tablet, Take 1 tablet (600 mg total) by mouth every 12 (twelve) hours (Patient not taking: Reported on 10/6/2021), Disp: 20 tablet, Rfl: 0    meloxicam (MOBIC) 15 mg tablet, Take 1 tablet (15 mg total) by mouth daily (Patient not taking: Reported on 2021), Disp: 30 tablet, Rfl: 1    valACYclovir (VALTREX) 1,000 mg tablet, Take 2 tablets (2,000 mg total) by mouth 2 (two) times a day for 1 day (Patient not taking: Reported on 10/6/2021), Disp: 4 tablet, Rfl: 1    Current Allergies     Allergies as of 2022 - Reviewed 2022   Allergen Reaction Noted    Seasonal ic [cholestatin] Itching 2018            The following portions of the patient's history were reviewed and updated as appropriate: allergies, current medications, past family history, past medical history, past social history, past surgical history and problem list      Past Medical History:   Diagnosis Date    Gastroenteritis     last assessed: 16    Hypothyroidism 2018    Plantar fasciitis, right     last assessed: 04/16/15    PONV (postoperative nausea and vomiting)     Seizures ([facility] [address] )     last assessed: 08/15/17    Thyroid disease     last assessed: 08/15/17    Viral gastroenteritis     last assessed: 17       Past Surgical History:   Procedure Laterality Date     SECTION      MOUTH SURGERY Bilateral     [place]  DELIVERY ONLY N/A 2018    Procedure: Garnell Living () REPEAT;  Surgeon: Naty Fernandez DO;  [facility]: [facility];  Service: Obstetrics    [place] EXCIS PRIMARY GANGLION WRIST Left 3/15/2018    Procedure: VOLAR WRIST GANGLION CYST EXCISION;  Surgeon: Cathleen Monsalve MD;  Location:  MAIN OR;  Service: Orthopedics    TUBAL LIGATION Bilateral 2018    Procedure: LIGATION/COAGULATION TUBAL;  Surgeon: Naty Fernandez DO;  [facility]: [facility];  Service: Obstetrics    WISDOM TOOTH EXTRACTION         Family History   Problem Relation Age of Onset    Cancer Maternal Grandmother     Cancer Maternal Grandfather  Skin cancer Maternal Grandfather     Diabetes Paternal Grandmother     No Known Problems Paternal Grandfather     No Known Problems Mother     No Known Problems Father          Medications have been verified  Objective   /84   Pulse 85   Temp 99 3 °F (37 4 °C)   Resp 18   Ht 5' 3" (1 6 m)   Wt 80 7 kg (178 lb)   SpO2 99%   BMI 31 53 kg/m²   No LMP recorded  Physical Exam     Physical Exam  Constitutional:       Appearance: Normal appearance  Cardiovascular:      Rate and Rhythm: Normal rate and regular rhythm  Heart sounds: Normal heart sounds  No murmur heard  No friction rub  No gallop  Pulmonary:      Effort: Pulmonary effort is normal  No respiratory distress  Breath sounds: Normal breath sounds  No stridor  No wheezing, rhonchi or rales  Chest:   Breasts:      Right: Swelling, mass (at 10pm position immediately lateral to nipple) and tenderness (overlying mass) present  No bleeding, inverted nipple, nipple discharge or skin change  Abdominal:      General: Abdomen is flat  There is no distension  Palpations: Abdomen is soft  There is no mass  Tenderness: There is no abdominal tenderness  There is no guarding  Neurological:      Mental Status: She is alert

## 2022-07-25 ENCOUNTER — HOSPITAL ENCOUNTER (OUTPATIENT)
Dept: ULTRASOUND IMAGING | Facility: CLINIC | Age: 37
Discharge: HOME | End: 2022-07-25
Payer: COMMERCIAL

## 2022-07-25 ENCOUNTER — HOSPITAL ENCOUNTER (OUTPATIENT)
Dept: MAMMOGRAPHY | Facility: CLINIC | Age: 37
Discharge: HOME | End: 2022-07-25
Payer: COMMERCIAL

## 2022-07-25 VITALS — HEIGHT: 63 IN | BODY MASS INDEX: 31.54 KG/M2 | WEIGHT: 178 LBS

## 2022-07-25 DIAGNOSIS — N64.4 BREAST PAIN, RIGHT: ICD-10-CM

## 2022-07-25 DIAGNOSIS — N64.4 BREAST PAIN: ICD-10-CM

## 2022-07-25 PROCEDURE — G0279 TOMOSYNTHESIS, MAMMO: HCPCS

## 2022-07-25 PROCEDURE — 76642 ULTRASOUND BREAST LIMITED: CPT

## 2022-07-25 PROCEDURE — 77066 DX MAMMO INCL CAD BI: CPT

## 2022-07-25 NOTE — PROGRESS NOTES
Met with patient and Dr Maryann Uriarte  regarding recommendation for;    ___X__ RIGHT ______LEFT      _X__Ultrasound guided  ______Stereotactic breast biopsy  __X___Verbalized understanding        Blood thinners:  No: __X___ Yes: ______ What:                 Biopsy teaching sheet given:  Yes: ___X___ No: ________    Pt given contact information and adv to call with any questions/needs

## 2022-07-27 ENCOUNTER — HOSPITAL ENCOUNTER (OUTPATIENT)
Dept: ULTRASOUND IMAGING | Facility: CLINIC | Age: 37
Discharge: HOME | End: 2022-07-27
Admitting: RADIOLOGY
Payer: COMMERCIAL

## 2022-07-27 ENCOUNTER — HOSPITAL ENCOUNTER (OUTPATIENT)
Dept: MAMMOGRAPHY | Facility: CLINIC | Age: 37
Discharge: HOME | End: 2022-07-27

## 2022-07-27 VITALS — SYSTOLIC BLOOD PRESSURE: 125 MMHG | HEART RATE: 63 BPM | DIASTOLIC BLOOD PRESSURE: 77 MMHG

## 2022-07-27 DIAGNOSIS — R92.8 ABNORMAL FINDINGS ON DIAGNOSTIC IMAGING OF BREAST: ICD-10-CM

## 2022-07-27 DIAGNOSIS — R92.8 ABNORMAL MAMMOGRAM: ICD-10-CM

## 2022-07-27 PROCEDURE — 88305 TISSUE EXAM BY PATHOLOGIST: CPT | Performed by: PATHOLOGY

## 2022-07-27 PROCEDURE — 19083 BX BREAST 1ST LESION US IMAG: CPT

## 2022-07-27 PROCEDURE — A4648 IMPLANTABLE TISSUE MARKER: HCPCS

## 2022-07-27 RX ORDER — LIDOCAINE HYDROCHLORIDE 10 MG/ML
5 INJECTION, SOLUTION EPIDURAL; INFILTRATION; INTRACAUDAL; PERINEURAL ONCE
Status: COMPLETED | OUTPATIENT
Start: 2022-07-27 | End: 2022-07-27

## 2022-07-27 RX ADMIN — LIDOCAINE HYDROCHLORIDE 5 ML: 10 INJECTION, SOLUTION EPIDURAL; INFILTRATION; INTRACAUDAL; PERINEURAL at 08:53

## 2022-07-27 NOTE — PROGRESS NOTES
Procedure type:    __x___ultrasound guided _____stereotactic    Breast:    _____Left __x___Right    Location: 11 o'clock ARAD 8 cmfn    Needle: 12 gauge nacho     # of passes: 5    Clip: SHERRIE      Performed by: Dr Merary Damico held for 5 minutes by: Althea Butler Strips:    __x___yes _____no    Band aid:    __x___yes_____no    Tape and guaze:    _____yes ___x__no    Tolerated procedure:    __x___yes _____no

## 2022-07-27 NOTE — DISCHARGE INSTR - OTHER ORDERS
POST SHERRIE  PATIENT INFORMATION      Place an ice pack inside your bra over the top of the dressing every hour for 20 minutes (20 minutes on, 60 minutes off)  Do this until bedtime  Tonight you may remove the bandaid  If steri-strips were used, tomorrow, you may bathe your breast carefully with the steri-strips in place  Be careful not to loosen them  The steri-strips will fall off in 3-5 days  You may have mild discomfort, and you may have some bruising where the needle entered the skin  This should clear within 5-7 days  If you need medicine for discomfort, take acetaminophen products such as Tylenol  You may also take Advil or Motrin products  DO NOT use Aspirin for the first 24 hours  Watch for continued bleeding, pain or fever over 101  If any of these symptoms occur, please contact our breast nurse navigator at the location where your biopsy was performed  During normal business hours (7:30am - 4:00pm) please call the nurse navigator at the site     where your procedure was performed:       [address]: [phone] or [phone] [address]: [phone] or [phone]     [address]: [address] [facility]: [address]: [phone]        After 4pm - please call your physician or go to the nearest Emergency Department location

## 2022-07-28 NOTE — PROGRESS NOTES
Post procedure call completed    Bleeding: _____yes __X___no (pt denies)    Pain: _____yes ___X___no (pt with tenderness, using ice, no need for OTC pain meds)    Redness/Swelling: ______yes ___X___no (pt denies)    Band aid removed: __X___yes _____no    Steri-Strips intact: ___X___yes _____no (discussed with patient to remove steri strips on Monday if they have not come off on their own)    Pt with no questions at this time, adv will call when results available, adv to call with any questions or concerns, has name/# for contact

## 2022-07-29 NOTE — PROGRESS NOTES
Call placed to patient regarding recommendation for rebiopsy;    __X___ RIGHT ______LEFT      __X___Ultrasound guided  ______Stereotactic breast biopsy  Pt aware of procedure, additional questions answered at this time    __X___Verbalized understanding        Blood thinners: No: __X___ Yes: _____ What:     Biopsy teaching sheet given:  _____yes __X__no (All teaching points discussed during call, pt with no questions at this time, pt adv to arrive at 1330 for 1400 biopsy)    Pt given name/# for any further questions/needs

## 2022-08-02 ENCOUNTER — HOSPITAL ENCOUNTER (OUTPATIENT)
Dept: ULTRASOUND IMAGING | Facility: CLINIC | Age: 37
Discharge: HOME | End: 2022-08-02

## 2022-08-04 ENCOUNTER — TELEPHONE (OUTPATIENT)
Dept: MAMMOGRAPHY | Facility: CLINIC | Age: 37
End: 2022-08-04

## 2022-08-04 ENCOUNTER — HOSPITAL ENCOUNTER (OUTPATIENT)
Dept: ULTRASOUND IMAGING | Facility: CLINIC | Age: 37
Discharge: HOME | End: 2022-08-04

## 2022-08-04 DIAGNOSIS — R92.8 ABNORMAL MAMMOGRAM: ICD-10-CM

## 2022-08-04 DIAGNOSIS — R92.8 ABNORMAL MAMMOGRAM: Primary | ICD-10-CM

## 2022-08-04 RX ORDER — LIDOCAINE HYDROCHLORIDE 10 MG/ML
5 INJECTION, SOLUTION EPIDURAL; INFILTRATION; INTRACAUDAL; PERINEURAL ONCE
OUTPATIENT
Start: 2022-08-04

## 2022-08-04 NOTE — TELEPHONE ENCOUNTER
Puja Simpson,    This patient came in for a second look biopsy however, when the radiologist reviewed the imaging again he felt that she would better benefit from a bilateral breast MRI with and without contrast rather than a second biopsy  Our radiologist did discuss this with the patient  If you could please place an order for this testing for the patient and then contact the patient and let her know it was ordered it would be appreciated  She can then call central scheduling at [phone] to schedule her MRI  If I can be of any further assistance please let me know       Thank you,   Qamar Madrid, MSN, RN, CN-BN   Breast Nurse Navigator

## 2022-08-23 ENCOUNTER — HOSPITAL ENCOUNTER (OUTPATIENT)
Dept: MRI IMAGING | Facility: HOSPITAL | Age: 37
Discharge: HOME | End: 2022-08-23
Payer: COMMERCIAL

## 2022-08-23 VITALS — BODY MASS INDEX: 31.54 KG/M2 | WEIGHT: 178 LBS | HEIGHT: 63 IN

## 2022-08-23 DIAGNOSIS — R92.8 ABNORMAL MAMMOGRAM: ICD-10-CM

## 2022-08-23 PROCEDURE — A9585 GADOBUTROL INJECTION: HCPCS | Performed by: NURSE PRACTITIONER

## 2022-08-23 PROCEDURE — C8937 CAD BREAST MRI: HCPCS

## 2022-08-23 PROCEDURE — G1004 CDSM NDSC: HCPCS

## 2022-08-23 PROCEDURE — C8908 MRI W/O FOL W/CONT, BREAST,: HCPCS

## 2022-08-23 RX ADMIN — GADOBUTROL 8 ML: 604.72 INJECTION INTRAVENOUS at 07:37

## 2022-08-24 DIAGNOSIS — R92.8 ABNORMAL MAMMOGRAM: Primary | ICD-10-CM

## 2022-08-24 NOTE — TELEPHONE ENCOUNTER
If you could please have the radiologist review the MRI results with patient that recommend the MRI that would be great she has some questions

## 2022-08-26 ENCOUNTER — OFFICE VISIT (OUTPATIENT)
Dept: INTERNAL MEDICINE CLINIC | Facility: OTHER | Age: 37
End: 2022-08-26
Payer: COMMERCIAL

## 2022-08-26 ENCOUNTER — APPOINTMENT (OUTPATIENT)
Dept: LAB | Facility: IMAGING CENTER | Age: 37
End: 2022-08-26

## 2022-08-26 VITALS
OXYGEN SATURATION: 99 % | SYSTOLIC BLOOD PRESSURE: 118 MMHG | WEIGHT: 184.8 LBS | DIASTOLIC BLOOD PRESSURE: 80 MMHG | TEMPERATURE: 98.5 F | BODY MASS INDEX: 32.74 KG/M2 | HEIGHT: 63 IN | HEART RATE: 68 BPM

## 2022-08-26 DIAGNOSIS — Z00.8 HEALTH EXAMINATION IN POPULATION SURVEYS: ICD-10-CM

## 2022-08-26 DIAGNOSIS — J02.9 SORE THROAT: ICD-10-CM

## 2022-08-26 DIAGNOSIS — J01.00 ACUTE NON-RECURRENT MAXILLARY SINUSITIS: Primary | ICD-10-CM

## 2022-08-26 PROBLEM — J06.9 URTI (ACUTE UPPER RESPIRATORY INFECTION): Status: RESOLVED | Noted: 2021-05-03 | Resolved: 2022-08-26

## 2022-08-26 LAB
CHOLEST SERPL-MCNC: 159 MG/DL
EST. AVERAGE GLUCOSE BLD GHB EST-MCNC: 114 MG/DL
HBA1C MFR BLD: 5.6 %
HDLC SERPL-MCNC: 44 MG/DL
LDLC SERPL CALC-MCNC: 83 MG/DL (ref 0–100)
NONHDLC SERPL-MCNC: 115 MG/DL
S PYO AG THROAT QL: NEGATIVE
TRIGL SERPL-MCNC: 160 MG/DL

## 2022-08-26 PROCEDURE — 83036 HEMOGLOBIN GLYCOSYLATED A1C: CPT

## 2022-08-26 PROCEDURE — 80061 LIPID PANEL: CPT

## 2022-08-26 PROCEDURE — 87880 STREP A ASSAY W/OPTIC: CPT | Performed by: NURSE PRACTITIONER

## 2022-08-26 PROCEDURE — 36415 COLL VENOUS BLD VENIPUNCTURE: CPT

## 2022-08-26 PROCEDURE — 99213 OFFICE O/P EST LOW 20 MIN: CPT | Performed by: NURSE PRACTITIONER

## 2022-08-26 RX ORDER — AMOXICILLIN AND CLAVULANATE POTASSIUM 875; 125 MG/1; MG/1
1 TABLET, FILM COATED ORAL EVERY 12 HOURS SCHEDULED
Qty: 14 TABLET | Refills: 0 | Status: SHIPPED | OUTPATIENT
Start: 2022-08-26 | End: 2022-09-02

## 2022-08-26 NOTE — PROGRESS NOTES
Assessment/Plan:    Problem List Items Addressed This Visit        Respiratory    Acute non-recurrent maxillary sinusitis - Primary     - start Augmentin twice daily for 7 days  Take with food  - continue flonase 2 sprays in each nostril daily  - Dayquil/Nyquil as needed for symptom relief  - warm salt water gargles, warm tea with honey   - follow up if symptoms worsen or fail to improve          Relevant Medications    amoxicillin-clavulanate (Augmentin) 875-125 mg per tablet      Other Visit Diagnoses     Sore throat        Relevant Orders    POCT rapid strepA          M*Modal software was used to dictate this note  It may contain errors with dictating incorrect words or incorrect spelling  Please contact the provider directly with any questions  Subjective:      Patient ID: Geoff Reeves is a 42 y o  female  HPI     Patient presents today with cold symptoms x 2 weeks since coming home from vacation in [place]  Symtpoms include bilateral ear pain left > right, sore throat, PND, productive cough  She has been using zyrtec  She tested herself last week for covid which was negative     The following portions of the patient's history were reviewed and updated as appropriate: allergies, current medications, past family history, past medical history, past social history, past surgical history and problem list     Review of Systems   Constitutional: Positive for fatigue  Negative for chills and fever  HENT: Positive for congestion, ear pain, postnasal drip, rhinorrhea and sore throat  Negative for ear discharge, sinus pressure and sinus pain  Respiratory: Positive for cough (productive) and chest tightness  Negative for shortness of breath and wheezing  Gastrointestinal: Negative for diarrhea, nausea and vomiting  Musculoskeletal: Negative for myalgias  Neurological: Positive for headaches (slight)           Past Medical History:   Diagnosis Date    Gastroenteritis     last assessed: 08/04/16  Hypothyroidism 2018    Plantar fasciitis, right     last assessed: 04/16/15    PONV (postoperative nausea and vomiting)     Seizures (HCC)     last assessed: 08/15/17    Thyroid disease     last assessed: 08/15/17    Viral gastroenteritis     last assessed: 17         Current Outpatient Medications:     amoxicillin-clavulanate (Augmentin) 875-125 mg per tablet, Take 1 tablet by mouth every 12 (twelve) hours for 7 days, Disp: 14 tablet, Rfl: 0    cetirizine (ZyrTEC) 10 mg tablet, Take 1 tablet (10 mg total) by mouth daily, Disp: 90 tablet, Rfl: 0    ergocalciferol (VITAMIN D2) 50,000 units, Take 1 capsule (50,000 Units total) by mouth once a week, Disp: 12 capsule, Rfl: 3    fluticasone (FLONASE) 50 mcg/act nasal spray, 1 spray into each nostril daily (Patient taking differently: 1 spray into each nostril as needed), Disp: 18 2 mL, Rfl: 0    levothyroxine 50 mcg tablet, Take 1 tablet (50 mcg total) by mouth daily, Disp: 90 tablet, Rfl: 1    valACYclovir (VALTREX) 1,000 mg tablet, Take 2 tablets (2,000 mg total) by mouth 2 (two) times a day for 1 day (Patient not taking: No sig reported), Disp: 4 tablet, Rfl: 1    Allergies   Allergen Reactions    Seasonal Ic [Cholestatin] Itching     Itchy eyes, congestion       Social History   Past Surgical History:   Procedure Laterality Date     SECTION      MOUTH SURGERY Bilateral 2000    [place]  DELIVERY ONLY N/A 2018    Procedure: [place]ike [place] () REPEAT;  Surgeon: She Dubon DO;  [facility];  Service: Obstetrics    [place] EXCIS PRIMARY GANGLION WRIST Left 3/15/2018    Procedure: VOLAR WRIST GANGLION CYST EXCISION;  Surgeon: Nga Welsh MD;  [facility];  Service: Orthopedics    TUBAL LIGATION Bilateral 2018    Procedure: LIGATION/COAGULATION TUBAL;  Surgeon: She Dubon DO;  [facility];  Service: Obstetrics    US BREAST SHERRIE  NEEDLE LOC RIGHT Right 2022    US GUIDED BREAST BIOPSY RIGHT COMPLETE Right 7/27/2022    WISDOM TOOTH EXTRACTION       Family History   Problem Relation Age of Onset    No Known Problems Mother     No Known Problems Father     Cancer Maternal Grandmother     Thyroid cancer Maternal Grandmother     Skin cancer Maternal Grandfather     Diabetes Paternal Grandmother     No Known Problems Paternal Grandfather        Objective:  /80 (BP Location: Left arm, Patient Position: Sitting, Cuff Size: Standard)   Pulse 68   Temp 98 5 °F (36 9 °C) (Temporal)   Ht 5' 3" (1 6 m)   Wt 83 8 kg (184 lb 12 8 oz)   LMP 08/15/2022   SpO2 99% Comment: RA  BMI 32 74 kg/m²      Physical Exam  Vitals reviewed  Constitutional:       General: She is not in acute distress  Appearance: Normal appearance  She is not diaphoretic  HENT:      Head: Normocephalic and atraumatic  Right Ear: External ear normal  A middle ear effusion is present  Tympanic membrane is not erythematous  Left Ear: External ear normal  A middle ear effusion is present  Tympanic membrane is not erythematous  Nose: Rhinorrhea present  No congestion  Right Sinus: Maxillary sinus tenderness present  Left Sinus: Maxillary sinus tenderness present  Mouth/Throat:      Mouth: Mucous membranes are moist       Pharynx: Oropharynx is clear  Posterior oropharyngeal erythema (mild) present  Tonsils: Tonsillar exudate present  2+ on the right  2+ on the left  Eyes:      Extraocular Movements: Extraocular movements intact  Conjunctiva/sclera: Conjunctivae normal       Pupils: Pupils are equal, round, and reactive to light  Cardiovascular:      Rate and Rhythm: Normal rate and regular rhythm  Heart sounds: Normal heart sounds  No murmur heard  Pulmonary:      Effort: Pulmonary effort is normal  No respiratory distress  Breath sounds: Normal breath sounds  No wheezing, rhonchi or rales  Lymphadenopathy:      Cervical: Cervical adenopathy present  Neurological:      Mental Status: She is alert and oriented to person, place, and time  Mental status is at baseline     Psychiatric:         Mood and Affect: Mood normal          Behavior: Behavior normal

## 2022-08-26 NOTE — ASSESSMENT & PLAN NOTE
- start Augmentin twice daily for 7 days  Take with food     - continue flonase 2 sprays in each nostril daily  - Dayquil/Nyquil as needed for symptom relief  - warm salt water gargles, warm tea with honey   - follow up if symptoms worsen or fail to improve

## 2022-08-31 ENCOUNTER — TELEMEDICINE (OUTPATIENT)
Dept: INTERNAL MEDICINE CLINIC | Age: 37
End: 2022-08-31
Payer: COMMERCIAL

## 2022-08-31 VITALS — TEMPERATURE: 98 F | HEIGHT: 63 IN | WEIGHT: 184 LBS | BODY MASS INDEX: 32.6 KG/M2

## 2022-08-31 DIAGNOSIS — U07.1 COVID-19: Primary | ICD-10-CM

## 2022-08-31 PROCEDURE — 99213 OFFICE O/P EST LOW 20 MIN: CPT | Performed by: NURSE PRACTITIONER

## 2022-08-31 RX ORDER — PREDNISONE 20 MG/1
40 TABLET ORAL DAILY
Qty: 10 TABLET | Refills: 0 | Status: SHIPPED | OUTPATIENT
Start: 2022-08-31 | End: 2022-09-05

## 2022-08-31 RX ORDER — NIRMATRELVIR AND RITONAVIR 300-100 MG
3 KIT ORAL 2 TIMES DAILY
Qty: 30 TABLET | Refills: 0 | Status: SHIPPED | OUTPATIENT
Start: 2022-08-31 | End: 2022-09-05

## 2022-08-31 NOTE — PROGRESS NOTES
COVID-19 Outpatient Progress Note    Assessment/Plan:    Problem List Items Addressed This Visit        Other    COVID-19 - Primary    Relevant Medications    predniSONE 20 mg tablet    nirmatrelvir & ritonavir (Paxlovid, 300/100,) tablet therapy pack         Disposition:       Rest and fluids advised  Educated that the course of this illness could be 2-4 weeks  Discussed symptomatic relief, such as warm steam inhalations, tylenol/ibuprofen for fevers and body aches, rest, and drink plenty of fluids  Warm salt gargles for sore throat  Discussed red flag signs to go to the ER, such as chest pain or shortness of breath  Return to the office for reevaluation if symptoms worsen or do not improve in 1-2 weeks       I have spent 15 minutes directly with the patient  Encounter provider: HARSHA Richey     Provider located at: [facility] [address] [facility] [address]  [address] [facility]  [address] [address]     Recent Visits  No visits were found meeting these conditions  Showing recent visits within past 7 days and meeting all other requirements  Today's Visits  Date Type Provider Dept   08/31/22 [place], CRNP [facility]   Showing today's visits and meeting all other requirements  Future Appointments  No visits were found meeting these conditions  Showing future appointments within next 150 days and meeting all other requirements     This virtual check-in was done via telephone and she agrees to proceed  Patient agrees to participate in a virtual check in via telephone or video visit instead of presenting to the office to address urgent/immediate medical needs  Patient is aware this is a billable service  She acknowledged consent and understanding of privacy and security of the video platform  The patient has agreed to participate and understands they can discontinue the visit at any time      After connecting through Telephone, the patient was identified by name and date of birth  Sri Butler was informed that this was a telemedicine visit and that the exam was being conducted confidentially over secure lines  My office door was closed  No one else was in the room  Sir Butler acknowledged consent and understanding of privacy and security of the telemedicine visit  I informed the patient that I have reviewed her record in Epic and presented the opportunity for her to ask any questions regarding the visit today  The patient agreed to participate  Verification of patient location:  Patient is located in the following state in which I hold an active license: [place]    Subjective:   Sri Butler is a 42 y o  female who has been screened for COVID-19  Symptom change since last report: unchanged  Patient's symptoms include fatigue, nasal congestion, sore throat, cough (dry/productive ) and myalgias  Patient denies fever, chills, malaise, rhinorrhea, anosmia, loss of taste, shortness of breath, chest tightness, abdominal pain, nausea, vomiting, diarrhea and headaches  - Date of symptom onset: 8/30/2022  - Date of positive COVID-19 test: 8/31/2022  Type of test: Home antigen  COVID-19 vaccination status: Fully vaccinated (primary series)    Kiko Pearce has been staying home and has isolated themselves in her home  She is taking care to not share personal items and is cleaning all surfaces that are touched often, like counters, tabletops, and doorknobs using household cleaning sprays or wipes  She is wearing a mask when she leaves her room       Currently on amoxicillin for sinus infection    Lab Results   Component Value Date    SARSCOV2 Positive (A) 12/05/2020    [address] Negative 05/09/2022     Past Medical History:   Diagnosis Date    Gastroenteritis     last assessed: 08/04/16    Hypothyroidism 01/25/2018    Plantar fasciitis, right     last assessed: 04/16/15    PONV (postoperative nausea and vomiting)     Seizures (HCC)     last assessed: 08/15/17    Thyroid disease     last assessed: 08/15/17    Viral gastroenteritis     last assessed: 17     Past Surgical History:   Procedure Laterality Date     SECTION      MOUTH SURGERY Bilateral     FL  DELIVERY ONLY N/A 2018    Procedure:  SECTION () REPEAT;  Surgeon: Aquiles Ivory DO;  [facility];  Service: Obstetrics    FL EXCIS PRIMARY GANGLION WRIST Left 3/15/2018    Procedure: VOLAR WRIST GANGLION CYST EXCISION;  Surgeon: Lilian Ramirez MD;  [facility];  Service: Orthopedics    TUBAL LIGATION Bilateral 2018    Procedure: LIGATION/COAGULATION TUBAL;  Surgeon: Aquiles Ivory DO;  [facility];  Service: Obstetrics    US BREAST SHERRIE  NEEDLE LOC RIGHT Right 2022    US GUIDED BREAST BIOPSY RIGHT COMPLETE Right 2022    WISDOM TOOTH EXTRACTION       Current Outpatient Medications   Medication Sig Dispense Refill    amoxicillin-clavulanate (Augmentin) 875-125 mg per tablet Take 1 tablet by mouth every 12 (twelve) hours for 7 days 14 tablet 0    cetirizine (ZyrTEC) 10 mg tablet Take 1 tablet (10 mg total) by mouth daily 90 tablet 0    ergocalciferol (VITAMIN D2) 50,000 units Take 1 capsule (50,000 Units total) by mouth once a week 12 capsule 3    fluticasone (FLONASE) 50 mcg/act nasal spray 1 spray into each nostril daily (Patient taking differently: 1 spray into each nostril as needed) 18 2 mL 0    levothyroxine 50 mcg tablet Take 1 tablet (50 mcg total) by mouth daily 90 tablet 1    nirmatrelvir & ritonavir (Paxlovid, 300/100,) tablet therapy pack Take 3 tablets by mouth 2 (two) times a day for 5 days Take 2 nirmatrelvir tablets + 1 ritonavir tablet together per dose 30 tablet 0    predniSONE 20 mg tablet Take 2 tablets (40 mg total) by mouth daily for 5 days 10 tablet 0    valACYclovir (VALTREX) 1,000 mg tablet Take 2 tablets (2,000 mg total) by mouth 2 (two) times a day for 1 day 4 tablet 1     No current facility-administered medications for this visit  Allergies   Allergen Reactions    Seasonal Ic [Cholestatin] Itching     Itchy eyes, congestion       Review of Systems   Constitutional: Positive for fatigue  Negative for activity change, appetite change, chills, diaphoresis and fever  HENT: Positive for congestion and sore throat  Negative for ear discharge, ear pain, postnasal drip, rhinorrhea, sinus pressure and sinus pain  Eyes: Negative for pain, discharge, itching and visual disturbance  Respiratory: Positive for cough (dry/productive )  Negative for chest tightness, shortness of breath and wheezing  Cardiovascular: Negative for chest pain, palpitations and leg swelling  Gastrointestinal: Negative for abdominal pain, constipation, diarrhea, nausea and vomiting  Endocrine: Negative for polydipsia, polyphagia and polyuria  Genitourinary: Negative for difficulty urinating, dysuria and urgency  Musculoskeletal: Positive for myalgias  Negative for arthralgias, back pain and neck pain  Skin: Negative for rash and wound  Neurological: Negative for dizziness, weakness, numbness and headaches  Objective:    Vitals:    08/31/22 1113   Temp: 98 °F (36 7 °C)   Weight: 83 5 kg (184 lb)   Height: 5' 3" (1 6 m)       Physical Exam  Neurological:      Mental Status: She is alert and oriented to person, place, and time

## 2022-10-19 ENCOUNTER — OFFICE VISIT (OUTPATIENT)
Dept: INTERNAL MEDICINE CLINIC | Facility: OTHER | Age: 37
End: 2022-10-19
Payer: COMMERCIAL

## 2022-10-19 VITALS
WEIGHT: 187 LBS | BODY MASS INDEX: 33.13 KG/M2 | TEMPERATURE: 98.7 F | HEIGHT: 63 IN | HEART RATE: 71 BPM | SYSTOLIC BLOOD PRESSURE: 124 MMHG | OXYGEN SATURATION: 98 % | DIASTOLIC BLOOD PRESSURE: 82 MMHG

## 2022-10-19 DIAGNOSIS — E03.9 HYPOTHYROIDISM, UNSPECIFIED TYPE: ICD-10-CM

## 2022-10-19 DIAGNOSIS — E55.9 VITAMIN D DEFICIENCY: Primary | ICD-10-CM

## 2022-10-19 DIAGNOSIS — Z00.00 ANNUAL PHYSICAL EXAM: ICD-10-CM

## 2022-10-19 PROCEDURE — 99395 PREV VISIT EST AGE 18-39: CPT | Performed by: NURSE PRACTITIONER

## 2022-10-19 NOTE — ASSESSMENT & PLAN NOTE
Patient up to date with fasting blood work  Advised patient to continue with well balanced diet, and to incorporate daily exercise  Patient will be getting flu vaccination at workplace  Encouraged monthly self breast examinations  Follow up in one year for annual physical       Due for cervical CA screening

## 2022-10-19 NOTE — PROGRESS NOTES
[facility] [address] [place] [address] Adult Female Physical Visit  Patient ID: Teresa Leiva    : 1985  Age/Gender: 42 y o  female     DATE: 10/19/2022      Assessment/Plan:    Annual physical exam  Patient up to date with fasting blood work  Advised patient to continue with well balanced diet, and to incorporate daily exercise  Patient will be getting flu vaccination at workplace  Encouraged monthly self breast examinations  Follow up in one year for annual physical       Due for cervical CA screening  BMI Counseling: Body mass index is 33 13 kg/m²  The BMI is above normal  Nutrition recommendations include decreasing portion sizes, encouraging healthy choices of fruits and vegetables, decreasing fast food intake, consuming healthier snacks, limiting drinks that contain sugar, moderation in carbohydrate intake, increasing intake of lean protein, reducing intake of saturated and trans fat and reducing intake of cholesterol  Exercise recommendations include moderate physical activity 150 minutes/week and exercising 3-5 times per week  Rationale for BMI follow-up plan is due to patient being overweight or obese  Depression Screening and Follow-up Plan: Patient was screened for depression during today's encounter  They screened negative with a PHQ-2 score of 0  Diagnoses and all orders for this visit:    Vitamin D deficiency  -     Vitamin D 25 hydroxy    Hypothyroidism, unspecified type  -     CBC and differential  -     Comprehensive metabolic panel;  Future  -     TSH, 3rd generation with Free T4 reflex    Annual physical exam          Subjective:     Teresa Leiva is a 42 y o  female who presents to the office on 10/19/2022 for a health maintenance physical     HPI  The following portions of the patient's history were reviewed and updated as appropriate: allergies, current medications, past family history, past medical history, past social history, past surgical history and problem list     Pt reports overall health:  "could be better"  Last Physical: last year  Last GYN Exam: 2017     Healthy Diet: well balanced  Routine Exercise: denies  Weight Concerns: would like to lose more    Problems with vision: denies, wears contacts  Last Eye Exam: yearly     Problems with Hearing: denies    Routine Dental Exams: unknown    Smoking History: denies  ETOH Use: socially  Illegal Drug Use:  Denies  Caffeine Use: ocassional     Reproductive Health:    Last PAP:  2017  History of abnormal PAP: in the past, had colposcopy  LMP: last week  Sexually Active:  Currently  Contraceptive:  Tubes tied  Problems with menstrual cycle:  Denies  Vaginal Discharge:  Denies      Self Breast Exams: monthly   Denies history of breast CA in the family     Depression Screening:  PHQ-2/9 Depression Screening    Little interest or pleasure in doing things: 0 - not at all  Feeling down, depressed, or hopeless: 0 - not at all  PHQ-2 Score: 0  PHQ-2 Interpretation: Negative depression screen         Family History of Colon CA:  Denies      Last Labs: 2022    Review of Systems   Constitutional: Negative for activity change, appetite change, chills, diaphoresis and fever  HENT: Negative for congestion, ear discharge, ear pain, postnasal drip, rhinorrhea, sinus pressure, sinus pain and sore throat  Eyes: Negative for pain, discharge, itching and visual disturbance  Respiratory: Negative for cough, chest tightness, shortness of breath and wheezing  Cardiovascular: Negative for chest pain, palpitations and leg swelling  Gastrointestinal: Negative for abdominal pain, constipation, diarrhea, nausea and vomiting  Endocrine: Negative for polydipsia, polyphagia and polyuria  Genitourinary: Negative for difficulty urinating, dysuria and urgency  Musculoskeletal: Negative for arthralgias, back pain and neck pain  Skin: Negative for rash and wound     Neurological: Negative for dizziness, weakness, numbness and headaches           Patient Active Problem List   Diagnosis   • Hypothyroidism   • GERD (gastroesophageal reflux disease)   • Ganglion cyst of volar aspect of left wrist   • Nausea   • S/P repeat low transverse    • Mild dysplasia of cervix (DAVINA I)   • Seizure disorder (HCC)   • S/P tubal ligation   • Annual physical exam   • Pain of left heel   • COVID-19   • Vitamin D deficiency   • Acute non-recurrent maxillary sinusitis   • Abnormal mammogram       Past Medical History:   Diagnosis Date   • Gastroenteritis     last assessed: 16   • Hypothyroidism 2018   • Plantar fasciitis, right     last assessed: 04/16/15   • PONV (postoperative nausea and vomiting)    • Seizures ([address] )     last assessed: 08/15/17   • Thyroid disease     last assessed: 08/15/17   • Viral gastroenteritis     last assessed: 17       Past Surgical History:   Procedure Laterality Date   •  SECTION     • MOUTH SURGERY Bilateral    • MT  DELIVERY ONLY N/A 2018    Procedure:  SECTION () REPEAT;  Surgeon: Pollo Lyman DO;  [facility];  Service: Obstetrics   • MT EXCIS PRIMARY GANGLION WRIST Left 3/15/2018    Procedure: VOLAR WRIST GANGLION CYST EXCISION;  Surgeon: Amy Rodriguez MD;  [facility];  Service: Orthopedics   • TUBAL LIGATION Bilateral 2018    Procedure: LIGATION/COAGULATION TUBAL;  Surgeon: Pollo Lyman DO;  [facility];  Service: Obstetrics   • US BREAST SHERRIE  NEEDLE LOC RIGHT Right 2022   • US GUIDED BREAST BIOPSY RIGHT COMPLETE Right 2022   • WISDOM TOOTH EXTRACTION           Current Outpatient Medications:   •  cetirizine (ZyrTEC) 10 mg tablet, Take 1 tablet (10 mg total) by mouth daily, Disp: 90 tablet, Rfl: 0  •  ergocalciferol (VITAMIN D2) 50,000 units, Take 1 capsule (50,000 Units total) by mouth once a week, Disp: 12 capsule, Rfl: 3  •  fluticasone (FLONASE) 50 mcg/act nasal spray, 1 spray into each nostril daily (Patient taking differently: 1 spray into each nostril as needed), Disp: 18 2 mL, Rfl: 0  •  levothyroxine 50 mcg tablet, Take 1 tablet (50 mcg total) by mouth daily, Disp: 90 tablet, Rfl: 1  •  valACYclovir (VALTREX) 1,000 mg tablet, Take 2 tablets (2,000 mg total) by mouth 2 (two) times a day for 1 day, Disp: 4 tablet, Rfl: 1    Allergies   Allergen Reactions   • Seasonal Ic [Cholestatin] Itching     Itchy eyes, congestion       Social History     Socioeconomic History   • Marital status: /Civil Union     Spouse name: None   • Number of children: None   • Years of education: None   • Highest education level: None   Occupational History   • None   Tobacco Use   • Smoking status: Never Smoker   • Smokeless tobacco: Never Used   Vaping Use   • Vaping Use: Never used   Substance and Sexual Activity   • Alcohol use:  Yes     Alcohol/week: 1 0 standard drink     Types: 1 Cans of beer per week     Comment: one per month   • Drug use: No   • Sexual activity: Yes   Other Topics Concern   • None   Social History Narrative    Caffeine use    Uses safety equipment - seatbelts     Social Determinants of Health     Financial Resource Strain: Not on file   Food Insecurity: Not on file   Transportation Needs: Not on file   Physical Activity: Not on file   Stress: Not on file   Social Connections: Not on file   Intimate Partner Violence: Not on file   Housing Stability: Not on file       Family History   Problem Relation Age of Onset   • No Known Problems Mother    • No Known Problems Father    • Cancer Maternal Grandmother    • Thyroid cancer Maternal Grandmother    • Skin cancer Maternal Grandfather    • Diabetes Paternal Grandmother    • No Known Problems Paternal Grandfather        Immunization History   Administered Date(s) Administered   • COVID-19 PFIZER VACCINE 0 3 ML IM 01/17/2021, 02/06/2021   • HPV Quadrivalent 09/03/2009   • INFLUENZA 12/01/2015, 12/01/2015, 11/01/2016, 11/09/2018, 10/28/2020, 11/29/2021   • Influenza Quadrivalent Preservative Free 3 years and older IM 11/09/2018   • Influenza Quadrivalent, 6-35 Months IM 12/18/2017   • Influenza, seasonal, injectable 11/01/2016   • Tdap 11/12/2015, 10/15/2018   • influenza, injectable, quadrivalent 11/29/2021        Health Maintenance   Topic Date Due   • Hepatitis C Screening  Never done   • COVID-19 Vaccine (3 - Booster for Pfizer series) 07/06/2021   • Influenza Vaccine (1) 09/01/2022   • Cervical Cancer Screening  09/12/2022   • Depression Screening  10/19/2023   • BMI: Followup Plan  10/19/2023   • BMI: Adult  10/19/2023   • Annual Physical  10/19/2023   • DTaP,Tdap,and Td Vaccines (3 - Td or Tdap) 10/15/2028   • HIV Screening  Completed   • Pneumococcal Vaccine: Pediatrics (0 to 5 Years) and At-Risk Patients (6 to 59 Years)  Aged Out   • HIB Vaccine  Aged Out   • Hepatitis B Vaccine  Aged Out   • IPV Vaccine  Aged Out   • Hepatitis A Vaccine  Aged Out   • Meningococcal ACWY Vaccine  Aged Out   • HPV Vaccine  Aged Out         Objective:  Vitals:    10/19/22 0912   BP: 124/82   BP Location: Left arm   Patient Position: Sitting   Cuff Size: Adult   Pulse: 71   Temp: 98 7 °F (37 1 °C)   TempSrc: Temporal   SpO2: 98%   Weight: 84 8 kg (187 lb)   Height: 5' 3" (1 6 m)     Wt Readings from Last 3 Encounters:   10/19/22 84 8 kg (187 lb)   08/31/22 83 5 kg (184 lb)   08/26/22 83 8 kg (184 lb 12 8 oz)     Body mass index is 33 13 kg/m²  No exam data present       Physical Exam  Constitutional:       General: She is not in acute distress  Appearance: She is well-developed  She is not diaphoretic  HENT:      Head: Normocephalic and atraumatic  Right Ear: External ear normal       Left Ear: External ear normal       Nose: Nose normal       Mouth/Throat:      Pharynx: No oropharyngeal exudate  Eyes:      General:         Right eye: No discharge  Left eye: No discharge        Conjunctiva/sclera: Conjunctivae normal  Pupils: Pupils are equal, round, and reactive to light  Neck:      Thyroid: No thyromegaly  Cardiovascular:      Rate and Rhythm: Normal rate and regular rhythm  Heart sounds: Normal heart sounds  No murmur heard  No friction rub  No gallop  Pulmonary:      Effort: Pulmonary effort is normal  No respiratory distress  Breath sounds: Normal breath sounds  No stridor  No wheezing or rales  Abdominal:      General: Bowel sounds are normal  There is no distension  Palpations: Abdomen is soft  Tenderness: There is no abdominal tenderness  Musculoskeletal:      Cervical back: Normal range of motion and neck supple  Lymphadenopathy:      Cervical: No cervical adenopathy  Skin:     General: Skin is warm and dry  Findings: No erythema or rash  Neurological:      Mental Status: She is alert and oriented to person, place, and time  Psychiatric:         Behavior: Behavior normal          Thought Content: Thought content normal          Judgment: Judgment normal              No future appointments      [address] [facility]    Patient Care Team:  Health system as PCP - General (Family Medicine)  Geovanna Ocasio MD (Maternal and Fetal Medicine)  Ross Mahoney MD (Maternal and Fetal Medicine)  Zeke Weeks MD (Maternal and Fetal Medicine)  Brigitte Lofton MD (Maternal and Fetal Medicine)  [facility] (Obstetrics and Gynecology)

## 2022-10-25 PROBLEM — J01.00 ACUTE NON-RECURRENT MAXILLARY SINUSITIS: Status: RESOLVED | Noted: 2021-05-03 | Resolved: 2022-10-25

## 2022-11-21 DIAGNOSIS — E55.9 VITAMIN D DEFICIENCY: ICD-10-CM

## 2022-11-21 RX ORDER — ERGOCALCIFEROL 1.25 MG/1
50000 CAPSULE ORAL WEEKLY
Qty: 12 CAPSULE | Refills: 3 | Status: SHIPPED | OUTPATIENT
Start: 2022-11-21

## 2022-12-07 ENCOUNTER — APPOINTMENT (OUTPATIENT)
Dept: LAB | Facility: IMAGING CENTER | Age: 37
End: 2022-12-07

## 2022-12-07 DIAGNOSIS — E03.9 HYPOTHYROIDISM, UNSPECIFIED TYPE: ICD-10-CM

## 2022-12-07 LAB
25(OH)D3 SERPL-MCNC: 66.7 NG/ML (ref 30–100)
ALBUMIN SERPL BCP-MCNC: 4.1 G/DL (ref 3.5–5)
ALP SERPL-CCNC: 66 U/L (ref 46–116)
ALT SERPL W P-5'-P-CCNC: 26 U/L (ref 12–78)
ANION GAP SERPL CALCULATED.3IONS-SCNC: 5 MMOL/L (ref 4–13)
AST SERPL W P-5'-P-CCNC: 20 U/L (ref 5–45)
BASOPHILS # BLD AUTO: 0.04 THOUSANDS/ÂΜL (ref 0–0.1)
BASOPHILS NFR BLD AUTO: 1 % (ref 0–1)
BILIRUB SERPL-MCNC: 0.29 MG/DL (ref 0.2–1)
BUN SERPL-MCNC: 16 MG/DL (ref 5–25)
CALCIUM SERPL-MCNC: 9.6 MG/DL (ref 8.3–10.1)
CHLORIDE SERPL-SCNC: 107 MMOL/L (ref 96–108)
CO2 SERPL-SCNC: 26 MMOL/L (ref 21–32)
CREAT SERPL-MCNC: 0.94 MG/DL (ref 0.6–1.3)
EOSINOPHIL # BLD AUTO: 0.12 THOUSAND/ÂΜL (ref 0–0.61)
EOSINOPHIL NFR BLD AUTO: 2 % (ref 0–6)
ERYTHROCYTE [DISTWIDTH] IN BLOOD BY AUTOMATED COUNT: 12.2 % (ref 11.6–15.1)
GFR SERPL CREATININE-BSD FRML MDRD: 76 ML/MIN/1.73SQ M
GLUCOSE P FAST SERPL-MCNC: 97 MG/DL (ref 65–99)
HCT VFR BLD AUTO: 40.7 % (ref 34.8–46.1)
HGB BLD-MCNC: 13.2 G/DL (ref 11.5–15.4)
IMM GRANULOCYTES # BLD AUTO: 0.02 THOUSAND/UL (ref 0–0.2)
IMM GRANULOCYTES NFR BLD AUTO: 0 % (ref 0–2)
LYMPHOCYTES # BLD AUTO: 2.28 THOUSANDS/ÂΜL (ref 0.6–4.47)
LYMPHOCYTES NFR BLD AUTO: 30 % (ref 14–44)
MCH RBC QN AUTO: 29.7 PG (ref 26.8–34.3)
MCHC RBC AUTO-ENTMCNC: 32.4 G/DL (ref 31.4–37.4)
MCV RBC AUTO: 92 FL (ref 82–98)
MONOCYTES # BLD AUTO: 0.49 THOUSAND/ÂΜL (ref 0.17–1.22)
MONOCYTES NFR BLD AUTO: 7 % (ref 4–12)
NEUTROPHILS # BLD AUTO: 4.61 THOUSANDS/ÂΜL (ref 1.85–7.62)
NEUTS SEG NFR BLD AUTO: 60 % (ref 43–75)
NRBC BLD AUTO-RTO: 0 /100 WBCS
PLATELET # BLD AUTO: 212 THOUSANDS/UL (ref 149–390)
PMV BLD AUTO: 11.6 FL (ref 8.9–12.7)
POTASSIUM SERPL-SCNC: 4.6 MMOL/L (ref 3.5–5.3)
PROT SERPL-MCNC: 7.8 G/DL (ref 6.4–8.4)
RBC # BLD AUTO: 4.45 MILLION/UL (ref 3.81–5.12)
SODIUM SERPL-SCNC: 138 MMOL/L (ref 135–147)
TSH SERPL DL<=0.05 MIU/L-ACNC: 1.91 UIU/ML (ref 0.45–4.5)
WBC # BLD AUTO: 7.56 THOUSAND/UL (ref 4.31–10.16)

## 2023-01-03 DIAGNOSIS — E03.9 HYPOTHYROIDISM, UNSPECIFIED TYPE: ICD-10-CM

## 2023-01-03 RX ORDER — LEVOTHYROXINE SODIUM 0.05 MG/1
50 TABLET ORAL DAILY
Qty: 90 TABLET | Refills: 1 | Status: SHIPPED | OUTPATIENT
Start: 2023-01-03

## 2023-02-21 DIAGNOSIS — B00.1 HERPES LABIALIS WITHOUT COMPLICATION: ICD-10-CM

## 2023-02-21 RX ORDER — VALACYCLOVIR HYDROCHLORIDE 1 G/1
2000 TABLET, FILM COATED ORAL 2 TIMES DAILY
Qty: 4 TABLET | Refills: 1 | Status: SHIPPED | OUTPATIENT
Start: 2023-02-21 | End: 2023-02-22

## 2023-08-11 ENCOUNTER — APPOINTMENT (OUTPATIENT)
Dept: LAB | Facility: IMAGING CENTER | Age: 38
End: 2023-08-11
Payer: COMMERCIAL

## 2023-08-11 DIAGNOSIS — Z00.8 OTHER SPECIFIED GENERAL MEDICAL EXAMINATION: ICD-10-CM

## 2023-08-11 LAB
CHOLEST SERPL-MCNC: 164 MG/DL
HDLC SERPL-MCNC: 58 MG/DL
LDLC SERPL CALC-MCNC: 90 MG/DL (ref 0–100)
NONHDLC SERPL-MCNC: 106 MG/DL
TRIGL SERPL-MCNC: 80 MG/DL

## 2023-08-11 PROCEDURE — 80061 LIPID PANEL: CPT

## 2023-08-11 PROCEDURE — 36415 COLL VENOUS BLD VENIPUNCTURE: CPT

## 2023-08-11 PROCEDURE — 83036 HEMOGLOBIN GLYCOSYLATED A1C: CPT

## 2023-08-12 LAB
EST. AVERAGE GLUCOSE BLD GHB EST-MCNC: 117 MG/DL
HBA1C MFR BLD: 5.7 %

## 2023-10-10 DIAGNOSIS — E03.9 HYPOTHYROIDISM, UNSPECIFIED TYPE: ICD-10-CM

## 2023-10-10 RX ORDER — LEVOTHYROXINE SODIUM 0.05 MG/1
50 TABLET ORAL DAILY
Qty: 90 TABLET | Refills: 1 | Status: SHIPPED | OUTPATIENT
Start: 2023-10-10

## 2023-11-13 ENCOUNTER — OFFICE VISIT (OUTPATIENT)
Age: 38
End: 2023-11-13
Payer: COMMERCIAL

## 2023-11-13 VITALS
DIASTOLIC BLOOD PRESSURE: 88 MMHG | OXYGEN SATURATION: 98 % | SYSTOLIC BLOOD PRESSURE: 136 MMHG | WEIGHT: 182.6 LBS | HEART RATE: 67 BPM | HEIGHT: 64 IN | TEMPERATURE: 97.2 F | BODY MASS INDEX: 31.18 KG/M2

## 2023-11-13 DIAGNOSIS — Z12.31 ENCOUNTER FOR SCREENING MAMMOGRAM FOR MALIGNANT NEOPLASM OF BREAST: Primary | ICD-10-CM

## 2023-11-13 DIAGNOSIS — E03.9 HYPOTHYROIDISM, UNSPECIFIED TYPE: ICD-10-CM

## 2023-11-13 DIAGNOSIS — E55.9 VITAMIN D DEFICIENCY: ICD-10-CM

## 2023-11-13 DIAGNOSIS — Z00.00 ANNUAL PHYSICAL EXAM: ICD-10-CM

## 2023-11-13 DIAGNOSIS — Z13.228 SCREENING FOR METABOLIC DISORDER: ICD-10-CM

## 2023-11-13 PROCEDURE — 99396 PREV VISIT EST AGE 40-64: CPT | Performed by: NURSE PRACTITIONER

## 2023-11-13 NOTE — ASSESSMENT & PLAN NOTE
-Would like to start phentermine  -need EKG prior to starting  -current weight 182 lbs   -current BMI 31.10  -follow up in 1.5 months, encouraged well balanced diet and daily exercise

## 2023-11-13 NOTE — ASSESSMENT & PLAN NOTE
Patient due for blood work. Advised patient to continue with well balanced diet, and to incorporate daily exercise. Patient will be getting flu vaccination at workplace. Encouraged monthly self breast examinations. Follow up in one year for annual physical .     Due for cervical CA screening.

## 2023-11-13 NOTE — PROGRESS NOTES
[address] [facility]  Well Adult Female Physical Visit  Patient ID: Milton Bardales    : 1985  Age/Gender: 34 y.o. female     DATE: 2023      Assessment/Plan:    Annual physical exam  Patient due for blood work. Advised patient to continue with well balanced diet, and to incorporate daily exercise. Patient will be getting flu vaccination at workplace. Encouraged monthly self breast examinations. Follow up in one year for annual physical .     Due for cervical CA screening. BMI 31.0-31.9,adult  -Would like to start phentermine  -need EKG prior to starting  -current weight 182 lbs   -current BMI 31.10  -follow up in 1.5 months, encouraged well balanced diet and daily exercise  BMI Counseling: Body mass index is 31.1 kg/m². The BMI is above normal. Nutrition recommendations include decreasing portion sizes, encouraging healthy choices of fruits and vegetables, decreasing fast food intake, consuming healthier snacks, limiting drinks that contain sugar, moderation in carbohydrate intake, increasing intake of lean protein, reducing intake of saturated and trans fat and reducing intake of cholesterol. Exercise recommendations include moderate physical activity 150 minutes/week and exercising 3-5 times per week. Rationale for BMI follow-up plan is due to patient being overweight or obese. Depression Screening and Follow-up Plan: Patient was screened for depression during today's encounter. They screened negative with a PHQ-2 score of 0.          Diagnoses and all orders for this visit:    Encounter for screening mammogram for malignant neoplasm of breast  -     Mammo screening bilateral w 3d & cad; Future    Hypothyroidism, unspecified type  -     TSH, 3rd generation with Free T4 reflex    Vitamin D deficiency  -     Vitamin D 25 hydroxy    Screening for metabolic disorder  -     CBC and differential  -     Comprehensive metabolic panel; Future    BMI 31.0-31.9,adult  -     ECG 12 lead; Future    Annual physical exam          Subjective:     Charley Hanna is a 34 y.o. female who presents to the office on 11/13/2023 for a health maintenance physical.    HPI  The following portions of the patient's history were reviewed and updated as appropriate: allergies, current medications, past family history, past medical history, past social history, past surgical history and problem list.    Pt reports overall health:  "could be better"  Last Physical: last year  Last GYN Exam: 2017     Healthy Diet: well balanced  Routine Exercise: denies  Weight Concerns: would like to lose more    Problems with vision: denies, wears contacts  Last Eye Exam: yearly     Problems with Hearing: denies    Routine Dental Exams: unknown    Smoking History: denies  ETOH Use: socially  Illegal Drug Use:  Denies  Caffeine Use: ocassional     Reproductive Health:    Last PAP:  2017  History of abnormal PAP: in the past, had colposcopy  LMP: last week  Sexually Active:  Currently  Contraceptive:  Tubes tied  Problems with menstrual cycle:  Denies  Vaginal Discharge:  Denies      Self Breast Exams: monthly   Denies history of breast CA in the family     Depression Screening:  PHQ-2/9 Depression Screening    Little interest or pleasure in doing things: 0 - not at all  Feeling down, depressed, or hopeless: 0 - not at all  PHQ-2 Score: 0  PHQ-2 Interpretation: Negative depression screen         Family History of Colon CA:  Denies      Last Labs: 2022    Review of Systems   Constitutional:  Negative for activity change, appetite change, chills, diaphoresis and fever. HENT:  Negative for congestion, ear discharge, ear pain, postnasal drip, rhinorrhea, sinus pressure, sinus pain and sore throat. Eyes:  Negative for pain, discharge, itching and visual disturbance. Respiratory:  Negative for cough, chest tightness, shortness of breath and wheezing.     Cardiovascular: Negative for chest pain, palpitations and leg swelling. Gastrointestinal:  Negative for abdominal pain, constipation, diarrhea, nausea and vomiting. Endocrine: Negative for polydipsia, polyphagia and polyuria. Genitourinary:  Negative for difficulty urinating, dysuria and urgency. Musculoskeletal:  Negative for arthralgias, back pain and neck pain. Skin:  Negative for rash and wound. Neurological:  Negative for dizziness, weakness, numbness and headaches.          Patient Active Problem List   Diagnosis    Hypothyroidism    GERD (gastroesophageal reflux disease)    Ganglion cyst of volar aspect of left wrist    Nausea    S/P repeat low transverse     Mild dysplasia of cervix (DAVINA I)    Seizure disorder (HCC)    S/P tubal ligation    Annual physical exam    Pain of left heel    COVID-19    Vitamin D deficiency    Abnormal mammogram    BMI 31.0-31.9,adult       Past Medical History:   Diagnosis Date    Gastroenteritis     last assessed: 16    Hypothyroidism 2018    Plantar fasciitis, right     last assessed: 04/16/15    PONV (postoperative nausea and vomiting)     Seizures ([address])     last assessed: 08/15/17    Thyroid disease     last assessed: 08/15/17    Viral gastroenteritis     last assessed: 17       Past Surgical History:   Procedure Laterality Date     SECTION      MOUTH SURGERY Bilateral     [place]  DELIVERY ONLY N/A 2018    Procedure:  SECTION () REPEAT;  Surgeon: Jimmie Soria DO;  [facility];  Service: Obstetrics    [place] EXCISION GANGLION WRIST DORSAL/VOLAR PRIMARY Left 3/15/2018    Procedure: VOLAR WRIST GANGLION CYST EXCISION;  Surgeon: Patricia Kirby MD;  Location:  MAIN OR;  Service: Orthopedics    TUBAL LIGATION Bilateral 2018    Procedure: LIGATION/COAGULATION TUBAL;  Surgeon: Jimmie Soria DO;  [facility]: [facility];  Service: Obstetrics    US BREAST SHERRIE  NEEDLE LOC RIGHT Right 2022    US GUIDED BREAST BIOPSY RIGHT COMPLETE Right 7/27/2022    WISDOM TOOTH EXTRACTION           Current Outpatient Medications:     cetirizine (ZyrTEC) 10 mg tablet, Take 1 tablet (10 mg total) by mouth daily, Disp: 90 tablet, Rfl: 0    ergocalciferol (VITAMIN D2) 50,000 units, Take 1 capsule (50,000 Units total) by mouth once a week, Disp: 12 capsule, Rfl: 3    fluticasone (FLONASE) 50 mcg/act nasal spray, 1 spray into each nostril daily (Patient taking differently: 1 spray into each nostril as needed), Disp: 18.2 mL, Rfl: 0    levothyroxine 50 mcg tablet, Take 1 tablet (50 mcg total) by mouth daily, Disp: 90 tablet, Rfl: 1    valACYclovir (VALTREX) 1,000 mg tablet, Take 2 tablets (2,000 mg total) by mouth 2 (two) times a day for 1 day, Disp: 4 tablet, Rfl: 1    Allergies   Allergen Reactions    Seasonal Ic [Cholestatin] Itching     Itchy eyes, congestion       Social History     Socioeconomic History    Marital status: /Civil Union     Spouse name: None    Number of children: None    Years of education: None    Highest education level: None   Occupational History    None   Tobacco Use    Smoking status: Never    Smokeless tobacco: Never   Vaping Use    Vaping Use: Never used   Substance and Sexual Activity    Alcohol use:  Yes     Alcohol/week: 1.0 standard drink of alcohol     Types: 1 Cans of beer per week     Comment: one per month    Drug use: No    Sexual activity: Yes   Other Topics Concern    None   Social History Narrative    Caffeine use    Uses safety equipment - seatbelts     Social Determinants of Health     Financial Resource Strain: Not on file   Food Insecurity: Not on file   Transportation Needs: Not on file   Physical Activity: Not on file   Stress: Not on file   Social Connections: Not on file   Intimate Partner Violence: Not on file   Housing Stability: Not on file       Family History   Problem Relation Age of Onset    No Known Problems Mother     No Known Problems Father     Cancer Maternal Grandmother Thyroid cancer Maternal Grandmother     Skin cancer Maternal Grandfather     Diabetes Paternal Grandmother     No Known Problems Paternal Grandfather        Immunization History   Administered Date(s) Administered    COVID-19 PFIZER VACCINE 0.3 ML IM 01/17/2021, 02/06/2021    HPV Quadrivalent 09/03/2009    INFLUENZA 12/01/2015, 12/01/2015, 11/01/2016, 11/09/2018, 10/28/2020, 11/29/2021    Influenza Quadrivalent Preservative Free 3 years and older IM 11/09/2018    Influenza Quadrivalent, 6-35 Months IM 12/18/2017    Influenza, seasonal, injectable 11/01/2016    Tdap 11/12/2015, 10/15/2018, 10/15/2018    influenza, injectable, quadrivalent 11/29/2021        Health Maintenance   Topic Date Due    Hepatitis C Screening  Never done    HPV Vaccine (2 - 3-dose series) 10/01/2009    COVID-19 Vaccine (3 - Pfizer series) 04/03/2021    Cervical Cancer Screening  09/12/2022    Breast Cancer Screening: Mammogram  07/25/2023    Influenza Vaccine (1) 09/01/2023    BMI: Followup Plan  10/19/2023    Depression Screening  11/13/2024    BMI: Adult  11/13/2024    Annual Physical  11/13/2024    DTaP,Tdap,and Td Vaccines (4 - Td or Tdap) 10/15/2028    HIV Screening  Completed    Pneumococcal Vaccine: Pediatrics (0 to 5 Years) and At-Risk Patients (6 to 59 Years)  Aged Out    HIB Vaccine  Aged Out    IPV Vaccine  Aged Out    Hepatitis A Vaccine  Aged Out    Meningococcal ACWY Vaccine  Aged Out         Objective:  Vitals:    11/13/23 0723   BP: 136/88   BP Location: Left arm   Patient Position: Sitting   Cuff Size: Standard   Pulse: 67   Temp: (!) 97.2 °F (36.2 °C)   TempSrc: Temporal   SpO2: 98%   Weight: 82.8 kg (182 lb 9.6 oz)   Height: 5' 4.25" (1.632 m)     Wt Readings from Last 3 Encounters:   11/13/23 82.8 kg (182 lb 9.6 oz)   10/19/22 84.8 kg (187 lb)   08/31/22 83.5 kg (184 lb)     Body mass index is 31.1 kg/m². No results found. Physical Exam  Constitutional:       General: She is not in acute distress.      Appearance: She is well-developed. She is not diaphoretic. HENT:      Head: Normocephalic and atraumatic. Right Ear: External ear normal.      Left Ear: External ear normal.      Nose: Nose normal.      Mouth/Throat:      Mouth: Mucous membranes are moist.      Pharynx: No oropharyngeal exudate or posterior oropharyngeal erythema. Eyes:      General:         Right eye: No discharge. Left eye: No discharge. Conjunctiva/sclera: Conjunctivae normal.      Pupils: Pupils are equal, round, and reactive to light. Neck:      Thyroid: No thyromegaly. Cardiovascular:      Rate and Rhythm: Normal rate and regular rhythm. Heart sounds: Normal heart sounds. No murmur heard. No friction rub. No gallop. Pulmonary:      Effort: Pulmonary effort is normal. No respiratory distress. Breath sounds: Normal breath sounds. No stridor. No wheezing or rales. Abdominal:      General: Bowel sounds are normal. There is no distension. Palpations: Abdomen is soft. Tenderness: There is no abdominal tenderness. Musculoskeletal:      Cervical back: Normal range of motion and neck supple. Lymphadenopathy:      Cervical: No cervical adenopathy. Skin:     General: Skin is warm and dry. Findings: No erythema or rash. Neurological:      Mental Status: She is alert and oriented to person, place, and time. Psychiatric:         Behavior: Behavior normal.         Thought Content:  Thought content normal.         Judgment: Judgment normal.             Future Appointments   Date Time Provider Department Center   1/5/2024  7:30 AM Lenny Valentin, [facility]    Patient Care Team:  Tati Johnson as PCP - General (Family Medicine)  Hussain Zamudio MD (Maternal and Fetal Medicine)  Doretha Tillman MD (Maternal and Fetal Medicine)  Donal De La Cruz MD (Maternal and Fetal Medicine)  Cortes Garnett, MD (Maternal and Fetal Medicine)  [address] [place] [address] (Obstetrics and Gynecology)

## 2023-11-14 ENCOUNTER — CLINICAL SUPPORT (OUTPATIENT)
Dept: INTERNAL MEDICINE CLINIC | Facility: OTHER | Age: 38
End: 2023-11-14
Payer: COMMERCIAL

## 2023-11-14 DIAGNOSIS — Z79.899 ENCOUNTER FOR MEDICATION MANAGEMENT: Primary | ICD-10-CM

## 2023-11-14 PROCEDURE — 93000 ELECTROCARDIOGRAM COMPLETE: CPT

## 2023-11-14 RX ORDER — PHENTERMINE HYDROCHLORIDE 15 MG/1
15 CAPSULE ORAL EVERY MORNING
Qty: 30 CAPSULE | Refills: 0 | Status: SHIPPED | OUTPATIENT
Start: 2023-11-14

## 2023-12-12 RX ORDER — PHENTERMINE HYDROCHLORIDE 15 MG/1
15 CAPSULE ORAL EVERY MORNING
Qty: 30 CAPSULE | Refills: 0 | Status: SHIPPED | OUTPATIENT
Start: 2023-12-12

## 2024-01-05 ENCOUNTER — OFFICE VISIT (OUTPATIENT)
Age: 39
End: 2024-01-05
Payer: COMMERCIAL

## 2024-01-05 VITALS
WEIGHT: 170.6 LBS | TEMPERATURE: 97.1 F | SYSTOLIC BLOOD PRESSURE: 134 MMHG | OXYGEN SATURATION: 98 % | HEIGHT: 64 IN | DIASTOLIC BLOOD PRESSURE: 80 MMHG | HEART RATE: 81 BPM | BODY MASS INDEX: 29.12 KG/M2

## 2024-01-05 DIAGNOSIS — Z79.899 MEDICATION MANAGEMENT: Primary | ICD-10-CM

## 2024-01-05 PROCEDURE — 99213 OFFICE O/P EST LOW 20 MIN: CPT | Performed by: NURSE PRACTITIONER

## 2024-01-05 RX ORDER — PHENTERMINE HYDROCHLORIDE 15 MG/1
15 CAPSULE ORAL EVERY MORNING
Qty: 90 CAPSULE | Refills: 0 | Status: SHIPPED | OUTPATIENT
Start: 2024-01-05

## 2024-01-05 NOTE — ASSESSMENT & PLAN NOTE
-continue phentermine  -EKG reviewed at last office visit   -current weight 170lbs, starting weight 182 lbs  -current BMI 29.06, starting BMI 31.10  -follow up in 3 months, encouraged well balanced diet and daily exercise

## 2024-01-05 NOTE — PROGRESS NOTES
Assessment/Plan:    BMI 31.0-31.9,adult  -continue phentermine  -EKG reviewed at last office visit   -current weight 170lbs, starting weight 182 lbs  -current BMI 29.06, starting BMI 31.10  -follow up in 3 months, encouraged well balanced diet and daily exercise            Diagnoses and all orders for this visit:    Medication management    BMI 31.0-31.9,adult  -     phentermine 15 MG capsule; Take 1 capsule (15 mg total) by mouth every morning          Subjective:      Patient ID: Deedee Robins is a 38 y.o. female.    Patient presents today to follow up on inability to lose weight.     She was started on phentermine last office visit.  She denies any side effects with this medication.  She does report recent spotting in between her menstrual cycles.    -current weight 170lbs, starting weight 182 lbs  -current BMI 29.06, starting BMI 31.10    She does report changes in her diet and increased exercise        The following portions of the patient's history were reviewed and updated as appropriate: allergies, current medications, past family history, past medical history, past social history, past surgical history, and problem list.    Review of Systems   Constitutional:  Negative for activity change, appetite change, chills, diaphoresis and fever.   HENT:  Negative for congestion, ear discharge, ear pain, postnasal drip, rhinorrhea, sinus pressure, sinus pain and sore throat.    Eyes:  Negative for pain, discharge, itching and visual disturbance.   Respiratory:  Negative for cough, chest tightness, shortness of breath and wheezing.    Cardiovascular:  Negative for chest pain, palpitations and leg swelling.   Gastrointestinal:  Negative for abdominal pain, constipation, diarrhea, nausea and vomiting.   Endocrine: Negative for polydipsia, polyphagia and polyuria.   Genitourinary:  Negative for difficulty urinating, dysuria and urgency.   Musculoskeletal:  Negative for arthralgias, back pain and neck pain.    Skin:  Negative for rash and wound.   Neurological:  Negative for dizziness, weakness, numbness and headaches.         Past Medical History:   Diagnosis Date    Gastroenteritis     last assessed: 16    Hypothyroidism 2018    Plantar fasciitis, right     last assessed: 04/16/15    PONV (postoperative nausea and vomiting)     Seizures (HCC)     last assessed: 08/15/17    Thyroid disease     last assessed: 08/15/17    Viral gastroenteritis     last assessed: 17         Current Outpatient Medications:     cetirizine (ZyrTEC) 10 mg tablet, Take 1 tablet (10 mg total) by mouth daily, Disp: 90 tablet, Rfl: 0    ergocalciferol (VITAMIN D2) 50,000 units, Take 1 capsule (50,000 Units total) by mouth once a week, Disp: 12 capsule, Rfl: 3    fluticasone (FLONASE) 50 mcg/act nasal spray, 1 spray into each nostril daily (Patient taking differently: 1 spray into each nostril as needed), Disp: 18.2 mL, Rfl: 0    levothyroxine 50 mcg tablet, Take 1 tablet (50 mcg total) by mouth daily, Disp: 90 tablet, Rfl: 1    phentermine 15 MG capsule, Take 1 capsule (15 mg total) by mouth every morning, Disp: 90 capsule, Rfl: 0    valACYclovir (VALTREX) 1,000 mg tablet, Take 2 tablets (2,000 mg total) by mouth 2 (two) times a day for 1 day, Disp: 4 tablet, Rfl: 1    Allergies   Allergen Reactions    Seasonal Ic [Cholestatin] Itching     Itchy eyes, congestion       Social History   Past Surgical History:   Procedure Laterality Date     SECTION      MOUTH SURGERY Bilateral     [place]  DELIVERY ONLY N/A 2018    Procedure:  SECTION () REPEAT;  Surgeon: Tenisha Forrester DO;  [facility];  Service: Obstetrics    [place] EXCISION GANGLION WRIST DORSAL/VOLAR PRIMARY Left 3/15/2018    Procedure: VOLAR WRIST GANGLION CYST EXCISION;  Surgeon: Meet Sheldon MD;  Location: [facility];  Service: Orthopedics    TUBAL LIGATION Bilateral 2018    Procedure: LIGATION/COAGULATION TUBAL;  Surgeon:  "Tenisha Forrester, DO;  Location: [facility];  Service: Obstetrics    US BREAST SHERRIE  NEEDLE LOC RIGHT Right 7/27/2022    US GUIDED BREAST BIOPSY RIGHT COMPLETE Right 7/27/2022    WISDOM TOOTH EXTRACTION       Family History   Problem Relation Age of Onset    No Known Problems Mother     No Known Problems Father     Cancer Maternal Grandmother     Thyroid cancer Maternal Grandmother     Skin cancer Maternal Grandfather     Diabetes Paternal Grandmother     No Known Problems Paternal Grandfather        Objective:  /80 (BP Location: Left arm, Patient Position: Sitting, Cuff Size: Standard)   Pulse 81   Temp (!) 97.1 °F (36.2 °C) (Temporal)   Ht 5' 4.25\" (1.632 m)   Wt 77.4 kg (170 lb 9.6 oz)   SpO2 98%   BMI 29.06 kg/m²     No results found for this or any previous visit (from the past 1344 hour(s)).         Physical Exam  Constitutional:       General: She is not in acute distress.     Appearance: She is well-developed. She is not diaphoretic.   HENT:      Head: Normocephalic and atraumatic.      Right Ear: External ear normal.      Left Ear: External ear normal.      Nose: Nose normal.      Mouth/Throat:      Mouth: Mucous membranes are moist.      Pharynx: No oropharyngeal exudate or posterior oropharyngeal erythema.   Eyes:      General:         Right eye: No discharge.         Left eye: No discharge.      Conjunctiva/sclera: Conjunctivae normal.      Pupils: Pupils are equal, round, and reactive to light.   Neck:      Thyroid: No thyromegaly.   Cardiovascular:      Rate and Rhythm: Normal rate and regular rhythm.      Heart sounds: Normal heart sounds. No murmur heard.     No friction rub. No gallop.   Pulmonary:      Effort: Pulmonary effort is normal. No respiratory distress.      Breath sounds: Normal breath sounds. No stridor. No wheezing or rales.   Abdominal:      General: Bowel sounds are normal. There is no distension.      Palpations: Abdomen is soft.      Tenderness: There is no abdominal " tenderness.   Musculoskeletal:      Cervical back: Normal range of motion and neck supple.   Lymphadenopathy:      Cervical: No cervical adenopathy.   Skin:     General: Skin is warm and dry.      Findings: No erythema or rash.   Neurological:      Mental Status: She is alert and oriented to person, place, and time.   Psychiatric:         Behavior: Behavior normal.         Thought Content: Thought content normal.         Judgment: Judgment normal.

## 2024-01-10 DIAGNOSIS — B00.1 HERPES LABIALIS WITHOUT COMPLICATION: ICD-10-CM

## 2024-01-10 RX ORDER — VALACYCLOVIR HYDROCHLORIDE 1 G/1
2000 TABLET, FILM COATED ORAL 2 TIMES DAILY
Qty: 4 TABLET | Refills: 1 | Status: SHIPPED | OUTPATIENT
Start: 2024-01-10 | End: 2024-01-17

## 2024-01-17 ENCOUNTER — OFFICE VISIT (OUTPATIENT)
Dept: INTERNAL MEDICINE CLINIC | Facility: OTHER | Age: 39
End: 2024-01-17
Payer: COMMERCIAL

## 2024-01-17 VITALS
WEIGHT: 170.8 LBS | TEMPERATURE: 97.5 F | HEART RATE: 82 BPM | BODY MASS INDEX: 30.26 KG/M2 | OXYGEN SATURATION: 99 % | HEIGHT: 63 IN

## 2024-01-17 DIAGNOSIS — J01.00 ACUTE NON-RECURRENT MAXILLARY SINUSITIS: ICD-10-CM

## 2024-01-17 DIAGNOSIS — J01.00 ACUTE NON-RECURRENT MAXILLARY SINUSITIS: Primary | ICD-10-CM

## 2024-01-17 PROCEDURE — 99213 OFFICE O/P EST LOW 20 MIN: CPT | Performed by: NURSE PRACTITIONER

## 2024-01-17 RX ORDER — AMOXICILLIN AND CLAVULANATE POTASSIUM 875; 125 MG/1; MG/1
1 TABLET, FILM COATED ORAL EVERY 12 HOURS SCHEDULED
Qty: 20 TABLET | Refills: 0 | Status: SHIPPED | OUTPATIENT
Start: 2024-01-17 | End: 2024-01-17 | Stop reason: SDUPTHER

## 2024-01-17 RX ORDER — AMOXICILLIN AND CLAVULANATE POTASSIUM 875; 125 MG/1; MG/1
1 TABLET, FILM COATED ORAL EVERY 12 HOURS SCHEDULED
Qty: 20 TABLET | Refills: 0 | Status: SHIPPED | OUTPATIENT
Start: 2024-01-17 | End: 2024-01-27

## 2024-01-17 NOTE — ASSESSMENT & PLAN NOTE
-start Augmentin  -flonase and Mucinex  -Rest and fluids advised.   Educated that the course of this illness could be 2-4 weeks.   Discussed symptomatic relief, such as warm steam inhalations, tylenol/ibuprofen for fevers and body aches, rest, and drink plenty of fluids.  Warm salt gargles for sore throat.   Discussed red flag signs to go to the ER, such as chest pain or shortness of breath.   Return to the office for reevaluation if symptoms worsen or do not improve in 1-2 weeks

## 2024-01-17 NOTE — PROGRESS NOTES
Assessment/Plan:    Acute non-recurrent maxillary sinusitis  -start Augmentin  -flonase and Mucinex  -Rest and fluids advised.   Educated that the course of this illness could be 2-4 weeks.   Discussed symptomatic relief, such as warm steam inhalations, tylenol/ibuprofen for fevers and body aches, rest, and drink plenty of fluids.  Warm salt gargles for sore throat.   Discussed red flag signs to go to the ER, such as chest pain or shortness of breath.   Return to the office for reevaluation if symptoms worsen or do not improve in 1-2 weeks                  Diagnoses and all orders for this visit:    Acute non-recurrent maxillary sinusitis  -     amoxicillin-clavulanate (AUGMENTIN) 875-125 mg per tablet; Take 1 tablet by mouth every 12 (twelve) hours for 10 days          Subjective:      Patient ID: Deedee Robins is a 38 y.o. female.    Patient presents today with cold-like symptoms.  She reports sinus pressure, headaches, congestion, ear pain, cough and fatigue.  Symptoms started approximately 10 days ago.  Denies any chills    She has been trying over-the-counter remedies with no relief of symptoms            The following portions of the patient's history were reviewed and updated as appropriate: allergies, current medications, past family history, past medical history, past social history, past surgical history, and problem list.    Review of Systems   Constitutional:  Positive for fatigue. Negative for activity change, appetite change, chills, diaphoresis and fever.   HENT:  Positive for congestion, sinus pressure, sinus pain and sore throat. Negative for ear discharge, ear pain, postnasal drip and rhinorrhea.    Eyes:  Negative for pain, discharge, itching and visual disturbance.   Respiratory:  Positive for cough. Negative for chest tightness, shortness of breath and wheezing.    Cardiovascular:  Negative for chest pain, palpitations and leg swelling.   Gastrointestinal:  Negative for abdominal pain,  constipation, diarrhea, nausea and vomiting.   Endocrine: Negative for polydipsia, polyphagia and polyuria.   Genitourinary:  Negative for difficulty urinating, dysuria and urgency.   Musculoskeletal:  Negative for arthralgias, back pain and neck pain.   Skin:  Negative for rash and wound.   Neurological:  Negative for dizziness, weakness, numbness and headaches.         Past Medical History:   Diagnosis Date    Allergic     Gastroenteritis     last assessed: 16    Headache(784.0) 22    Hypothyroidism 2018    Plantar fasciitis, right     last assessed: 04/16/15    PONV (postoperative nausea and vomiting)     Seizures ([facility]CC)     last assessed: 08/15/17    Thyroid disease     last assessed: 08/15/17    Viral gastroenteritis     last assessed: 17         Current Outpatient Medications:     amoxicillin-clavulanate (AUGMENTIN) 875-125 mg per tablet, Take 1 tablet by mouth every 12 (twelve) hours for 10 days, Disp: 20 tablet, Rfl: 0    cetirizine (ZyrTEC) 10 mg tablet, Take 1 tablet (10 mg total) by mouth daily, Disp: 90 tablet, Rfl: 0    ergocalciferol (VITAMIN D2) 50,000 units, Take 1 capsule (50,000 Units total) by mouth once a week, Disp: 12 capsule, Rfl: 3    fluticasone (FLONASE) 50 mcg/act nasal spray, 1 spray into each nostril daily (Patient taking differently: 1 spray into each nostril as needed), Disp: 18.2 mL, Rfl: 0    levothyroxine 50 mcg tablet, Take 1 tablet (50 mcg total) by mouth daily, Disp: 90 tablet, Rfl: 1    phentermine 15 MG capsule, Take 1 capsule (15 mg total) by mouth every morning, Disp: 90 capsule, Rfl: 0    valACYclovir (VALTREX) 1,000 mg tablet, Take 2 tablets (2,000 mg total) by mouth 2 (two) times a day for 1 day, Disp: 4 tablet, Rfl: 1    Allergies   Allergen Reactions    Seasonal Ic [Cholestatin] Itching     Itchy eyes, congestion       Social History   Past Surgical History:   Procedure Laterality Date     SECTION      MOUTH SURGERY Bilateral     [place]  " DELIVERY ONLY N/A 2018    Procedure:  SECTION () REPEAT;  Surgeon: Tenisha Forrester DO;  Location: [facility];  Service: Obstetrics    [place] EXCISION GANGLION WRIST DORSAL/VOLAR PRIMARY Left 3/15/2018    Procedure: VOLAR WRIST GANGLION CYST EXCISION;  Surgeon: Meet Sheldon MD;  Location: [facility];  Service: Orthopedics    TUBAL LIGATION Bilateral 2018    Procedure: LIGATION/COAGULATION TUBAL;  Surgeon: Tenisha Forrester DO;  Location: [facility];  Service: Obstetrics    US BREAST SHERRIE  NEEDLE LOC RIGHT Right 2022    US GUIDED BREAST BIOPSY RIGHT COMPLETE Right 2022    WISDOM TOOTH EXTRACTION       Family History   Problem Relation Age of Onset    No Known Problems Mother     No Known Problems Father     Cancer Maternal Grandmother     Thyroid cancer Maternal Grandmother     Skin cancer Maternal Grandfather     Cancer Maternal Grandfather     Diabetes Paternal Grandmother     No Known Problems Paternal Grandfather        Objective:  Pulse 82   Temp 97.5 °F (36.4 °C)   Ht 5' 3\" (1.6 m)   Wt 77.5 kg (170 lb 12.8 oz)   SpO2 99%   BMI 30.26 kg/m²     No results found for this or any previous visit (from the past 1344 hour(s)).         Physical Exam  Constitutional:       General: She is not in acute distress.     Appearance: She is well-developed. She is not diaphoretic.   HENT:      Head: Normocephalic and atraumatic.      Right Ear: External ear normal. A middle ear effusion is present. Tympanic membrane is not erythematous.      Left Ear: External ear normal. A middle ear effusion is present. Tympanic membrane is not erythematous.      Nose: Nose normal.      Mouth/Throat:      Mouth: Mucous membranes are moist.      Pharynx: Posterior oropharyngeal erythema present. No oropharyngeal exudate.   Eyes:      General:         Right eye: No discharge.         Left eye: No discharge.      Conjunctiva/sclera: Conjunctivae normal.      Pupils: Pupils are equal, round, and reactive " to light.   Neck:      Thyroid: No thyromegaly.   Cardiovascular:      Rate and Rhythm: Normal rate and regular rhythm.      Heart sounds: Normal heart sounds. No murmur heard.     No friction rub. No gallop.   Pulmonary:      Effort: Pulmonary effort is normal. No respiratory distress.      Breath sounds: Normal breath sounds. No stridor. No wheezing or rales.   Abdominal:      General: Bowel sounds are normal. There is no distension.      Palpations: Abdomen is soft.      Tenderness: There is no abdominal tenderness.   Musculoskeletal:      Cervical back: Normal range of motion and neck supple.   Lymphadenopathy:      Cervical: No cervical adenopathy.   Skin:     General: Skin is warm and dry.      Findings: No erythema or rash.   Neurological:      Mental Status: She is alert and oriented to person, place, and time.   Psychiatric:         Behavior: Behavior normal.         Thought Content: Thought content normal.         Judgment: Judgment normal.

## 2024-01-30 ENCOUNTER — APPOINTMENT (OUTPATIENT)
Dept: LAB | Facility: IMAGING CENTER | Age: 39
End: 2024-01-30
Payer: COMMERCIAL

## 2024-01-30 DIAGNOSIS — Z13.228 SCREENING FOR METABOLIC DISORDER: ICD-10-CM

## 2024-01-30 LAB
25(OH)D3 SERPL-MCNC: 28.6 NG/ML (ref 30–100)
ALBUMIN SERPL BCP-MCNC: 4.3 G/DL (ref 3.5–5)
ALP SERPL-CCNC: 57 U/L (ref 34–104)
ALT SERPL W P-5'-P-CCNC: 18 U/L (ref 7–52)
ANION GAP SERPL CALCULATED.3IONS-SCNC: 7 MMOL/L
AST SERPL W P-5'-P-CCNC: 20 U/L (ref 13–39)
BASOPHILS # BLD AUTO: 0.05 THOUSANDS/ÂΜL (ref 0–0.1)
BASOPHILS NFR BLD AUTO: 1 % (ref 0–1)
BILIRUB SERPL-MCNC: 0.4 MG/DL (ref 0.2–1)
BUN SERPL-MCNC: 14 MG/DL (ref 5–25)
CALCIUM SERPL-MCNC: 9.4 MG/DL (ref 8.4–10.2)
CHLORIDE SERPL-SCNC: 104 MMOL/L (ref 96–108)
CHOLEST SERPL-MCNC: 120 MG/DL
CO2 SERPL-SCNC: 28 MMOL/L (ref 21–32)
CREAT SERPL-MCNC: 0.91 MG/DL (ref 0.6–1.3)
EOSINOPHIL # BLD AUTO: 0.08 THOUSAND/ÂΜL (ref 0–0.61)
EOSINOPHIL NFR BLD AUTO: 1 % (ref 0–6)
ERYTHROCYTE [DISTWIDTH] IN BLOOD BY AUTOMATED COUNT: 12.6 % (ref 11.6–15.1)
GFR SERPL CREATININE-BSD FRML MDRD: 79 ML/MIN/1.73SQ M
GLUCOSE P FAST SERPL-MCNC: 92 MG/DL (ref 65–99)
HCT VFR BLD AUTO: 42.2 % (ref 34.8–46.1)
HDLC SERPL-MCNC: 44 MG/DL
HGB BLD-MCNC: 13.5 G/DL (ref 11.5–15.4)
IMM GRANULOCYTES # BLD AUTO: 0.01 THOUSAND/UL (ref 0–0.2)
IMM GRANULOCYTES NFR BLD AUTO: 0 % (ref 0–2)
LDLC SERPL CALC-MCNC: 62 MG/DL (ref 0–100)
LYMPHOCYTES # BLD AUTO: 2.4 THOUSANDS/ÂΜL (ref 0.6–4.47)
LYMPHOCYTES NFR BLD AUTO: 33 % (ref 14–44)
MCH RBC QN AUTO: 29.2 PG (ref 26.8–34.3)
MCHC RBC AUTO-ENTMCNC: 32 G/DL (ref 31.4–37.4)
MCV RBC AUTO: 91 FL (ref 82–98)
MONOCYTES # BLD AUTO: 0.46 THOUSAND/ÂΜL (ref 0.17–1.22)
MONOCYTES NFR BLD AUTO: 6 % (ref 4–12)
NEUTROPHILS # BLD AUTO: 4.28 THOUSANDS/ÂΜL (ref 1.85–7.62)
NEUTS SEG NFR BLD AUTO: 59 % (ref 43–75)
NONHDLC SERPL-MCNC: 76 MG/DL
NRBC BLD AUTO-RTO: 0 /100 WBCS
PLATELET # BLD AUTO: 251 THOUSANDS/UL (ref 149–390)
PMV BLD AUTO: 11.4 FL (ref 8.9–12.7)
POTASSIUM SERPL-SCNC: 4.7 MMOL/L (ref 3.5–5.3)
PROT SERPL-MCNC: 7.1 G/DL (ref 6.4–8.4)
RBC # BLD AUTO: 4.63 MILLION/UL (ref 3.81–5.12)
SODIUM SERPL-SCNC: 139 MMOL/L (ref 135–147)
TRIGL SERPL-MCNC: 70 MG/DL
TSH SERPL DL<=0.05 MIU/L-ACNC: 1.14 UIU/ML (ref 0.45–4.5)
WBC # BLD AUTO: 7.28 THOUSAND/UL (ref 4.31–10.16)

## 2024-01-30 PROCEDURE — 80053 COMPREHEN METABOLIC PANEL: CPT

## 2024-01-31 DIAGNOSIS — E55.9 VITAMIN D DEFICIENCY: ICD-10-CM

## 2024-01-31 RX ORDER — ERGOCALCIFEROL 1.25 MG/1
50000 CAPSULE ORAL WEEKLY
Qty: 12 CAPSULE | Refills: 3 | Status: SHIPPED | OUTPATIENT
Start: 2024-01-31

## 2024-02-05 DIAGNOSIS — E03.9 HYPOTHYROIDISM, UNSPECIFIED TYPE: ICD-10-CM

## 2024-02-05 RX ORDER — LEVOTHYROXINE SODIUM 0.05 MG/1
50 TABLET ORAL DAILY
Qty: 90 TABLET | Refills: 1 | Status: SHIPPED | OUTPATIENT
Start: 2024-02-05

## 2024-02-21 PROBLEM — J01.00 ACUTE NON-RECURRENT MAXILLARY SINUSITIS: Status: RESOLVED | Noted: 2021-05-03 | Resolved: 2024-02-21

## 2024-02-27 ENCOUNTER — OFFICE VISIT (OUTPATIENT)
Dept: INTERNAL MEDICINE CLINIC | Facility: OTHER | Age: 39
End: 2024-02-27
Payer: COMMERCIAL

## 2024-02-27 VITALS
HEIGHT: 63 IN | TEMPERATURE: 98.3 F | OXYGEN SATURATION: 99 % | BODY MASS INDEX: 28.88 KG/M2 | SYSTOLIC BLOOD PRESSURE: 126 MMHG | DIASTOLIC BLOOD PRESSURE: 88 MMHG | HEART RATE: 118 BPM | WEIGHT: 163 LBS

## 2024-02-27 DIAGNOSIS — J11.1 INFLUENZA: Primary | ICD-10-CM

## 2024-02-27 LAB
S PYO AG THROAT QL: NEGATIVE
SARS-COV-2 AG UPPER RESP QL IA: NEGATIVE
SL AMB POCT RAPID FLU A: POSITIVE
SL AMB POCT RAPID FLU B: NEGATIVE
VALID CONTROL: NORMAL

## 2024-02-27 PROCEDURE — 99213 OFFICE O/P EST LOW 20 MIN: CPT

## 2024-02-27 PROCEDURE — 87804 INFLUENZA ASSAY W/OPTIC: CPT

## 2024-02-27 PROCEDURE — 87811 SARS-COV-2 COVID19 W/OPTIC: CPT

## 2024-02-27 PROCEDURE — 87880 STREP A ASSAY W/OPTIC: CPT

## 2024-02-27 RX ORDER — BENZONATATE 100 MG/1
200 CAPSULE ORAL 3 TIMES DAILY PRN
Qty: 20 CAPSULE | Refills: 0 | Status: SHIPPED | OUTPATIENT
Start: 2024-02-27

## 2024-02-27 RX ORDER — OSELTAMIVIR PHOSPHATE 75 MG/1
75 CAPSULE ORAL EVERY 12 HOURS SCHEDULED
Qty: 10 CAPSULE | Refills: 0 | Status: SHIPPED | OUTPATIENT
Start: 2024-02-27 | End: 2024-03-03

## 2024-02-27 NOTE — PATIENT INSTRUCTIONS
Flonase twice daily, second-generation antihistamine such as Zyrtec daily, decongestant daily  Increased rest, fluid intake  Discussed symptomatic care including salt water gargles for sore throat, steam showers for congestion, warm liquids   Patient can take Tylenol/ibuprofen for fevers and body aches  Discussed red flag symptoms including going to the ER with chest pain or shortness of breath  Discussed course of illness could be 1 to 2 weeks.   Return to the office for reevaluation if symptoms do not improve in 1-2 weeks

## 2024-02-27 NOTE — LETTER
February 27, 2024     Patient: Deedee Robins  YOB: 1985  Date of Visit: 2/27/2024      To Whom it May Concern:    Deedee Robins is under my professional care. Deedee was seen in my office on 2/27/2024. Deedee may return to work on 3/4/24 .    If you have any questions or concerns, please don't hesitate to call.         Sincerely,          Melani Willams PA-C        CC: No Recipients

## 2024-02-27 NOTE — PROGRESS NOTES
Assessment/Plan:    1. Influenza  Assessment & Plan:  Flu A positive in office today  Patient symptoms started approximately 14 to 16 hours ago  Patient is within window for Tamiflu treatment  Discussed side effects of medication  Offered Zofran for nausea, patient states she still has some at home  Advised Flonase twice daily, second-generation antihistamine such as Zyrtec daily, decongestant daily  Advised increased rest, fluid intake  Discussed symptomatic care including salt water gargles for sore throat, steam showers for congestion, warm liquids   Patient can take Tylenol/ibuprofen for fevers and body aches  Discussed red flag symptoms including going to the ER with chest pain or shortness of breath  Discussed course of illness could be 1 to 2 weeks.   Return to the office for reevaluation if symptoms do not improve in 1-2 weeks     Orders:  -     POCT Rapid Covid Ag  -     POCT rapid flu A and B  -     POCT rapid ANTIGEN strepA  -     oseltamivir (TAMIFLU) 75 mg capsule; Take 1 capsule (75 mg total) by mouth every 12 (twelve) hours for 5 days  -     benzonatate (TESSALON PERLES) 100 mg capsule; Take 2 capsules (200 mg total) by mouth 3 (three) times a day as needed for cough           Depression Screening and Follow-up Plan: Patient was screened for depression during today's encounter. They screened negative with a PHQ-2 score of 0.        [employer] software was used to dictate this note.  It may contain errors with dictating incorrect words or incorrect spelling. Please contact the provider directly with any questions.    Subjective:      Patient ID: Deedee Robins is a 39 y.o. female.    Patient is a 39-year-old female presenting to the office for upper respiratory symptoms x 1 day  Patient reports sore throat, sinus/chest congestion, ear pain, coughing, nausea, headache, body aches    POCT Influenza a:  positive  POCT COVID: Negative   POCT strep: Negative    Treatments tried include Mucinex last  night    URI   This is a new problem. The current episode started yesterday. There has been no fever. Associated symptoms include chest pain, congestion, coughing, headaches, nausea, rhinorrhea, sinus pain and a sore throat. Pertinent negatives include no diarrhea, ear pain, neck pain, plugged ear sensation, vomiting or wheezing.       The following portions of the patient's history were reviewed and updated as appropriate: allergies, current medications, past family history, past medical history, past social history, past surgical history, and problem list.    Review of Systems   Constitutional:  Positive for chills and fatigue. Negative for fever.   HENT:  Positive for congestion, postnasal drip, rhinorrhea, sinus pressure, sinus pain and sore throat. Negative for ear pain and trouble swallowing.    Respiratory:  Positive for cough. Negative for chest tightness, shortness of breath and wheezing.    Cardiovascular:  Positive for chest pain. Negative for palpitations.   Gastrointestinal:  Positive for nausea. Negative for anal bleeding, constipation, diarrhea and vomiting.   Musculoskeletal:  Negative for neck pain.   Neurological:  Positive for headaches. Negative for dizziness, light-headedness and numbness.         Past Medical History:   Diagnosis Date    Allergic     Gastroenteritis     last assessed: 08/04/16    Headache(784.0) 5/5/22    Hypothyroidism 01/25/2018    Plantar fasciitis, right     last assessed: 04/16/15    PONV (postoperative nausea and vomiting)     Seizures ([facility])     last assessed: 08/15/17    Thyroid disease     last assessed: 08/15/17    Viral gastroenteritis     last assessed: 03/27/17         Current Outpatient Medications:     benzonatate (TESSALON PERLES) 100 mg capsule, Take 2 capsules (200 mg total) by mouth 3 (three) times a day as needed for cough, Disp: 20 capsule, Rfl: 0    cetirizine (ZyrTEC) 10 mg tablet, Take 1 tablet (10 mg total) by mouth daily, Disp: 90 tablet, Rfl: 0     ergocalciferol (VITAMIN D2) 50,000 units, Take 1 capsule (50,000 Units total) by mouth once a week, Disp: 12 capsule, Rfl: 3    fluticasone (FLONASE) 50 mcg/act nasal spray, 1 spray into each nostril daily (Patient taking differently: 1 spray into each nostril as needed), Disp: 18.2 mL, Rfl: 0    levothyroxine 50 mcg tablet, Take 1 tablet (50 mcg total) by mouth daily, Disp: 90 tablet, Rfl: 1    oseltamivir (TAMIFLU) 75 mg capsule, Take 1 capsule (75 mg total) by mouth every 12 (twelve) hours for 5 days, Disp: 10 capsule, Rfl: 0    phentermine 15 MG capsule, Take 1 capsule (15 mg total) by mouth every morning, Disp: 90 capsule, Rfl: 0    valACYclovir (VALTREX) 1,000 mg tablet, Take 2 tablets (2,000 mg total) by mouth 2 (two) times a day for 1 day, Disp: 4 tablet, Rfl: 1    Allergies   Allergen Reactions    Seasonal Ic [Cholestatin] Itching     Itchy eyes, congestion       Social History   Past Surgical History:   Procedure Laterality Date     SECTION      MOUTH SURGERY Bilateral     [place]  DELIVERY ONLY N/A 2018    Procedure:  SECTION () REPEAT;  Surgeon: Tenisha Forrester DO;  Location: [facility];  Service: Obstetrics    [place] EXCISION GANGLION WRIST DORSAL/VOLAR PRIMARY Left 3/15/2018    Procedure: VOLAR WRIST GANGLION CYST EXCISION;  Surgeon: Meet Sheldon MD;  Location: [facility] OR;  Service: Orthopedics    TUBAL LIGATION Bilateral 2018    Procedure: LIGATION/COAGULATION TUBAL;  Surgeon: Tenisha Forrester DO;  Location: [facility];  Service: Obstetrics    US BREAST SHERRIE  NEEDLE LOC RIGHT Right 2022    US GUIDED BREAST BIOPSY RIGHT COMPLETE Right 2022    WISDOM TOOTH EXTRACTION       Family History   Problem Relation Age of Onset    No Known Problems Mother     No Known Problems Father     Cancer Maternal Grandmother     Thyroid cancer Maternal Grandmother     Skin cancer Maternal Grandfather     Cancer Maternal Grandfather     Diabetes Paternal Grandmother     No Known  "Problems Paternal Grandfather        Objective:  /88 (BP Location: Left arm, Patient Position: Sitting, Cuff Size: Adult)   Pulse (!) 118   Temp 98.3 °F (36.8 °C)   Ht 5' 3\" (1.6 m)   Wt 73.9 kg (163 lb)   SpO2 99%   BMI 28.87 kg/m²      Physical Exam  Vitals and nursing note reviewed.   Constitutional:       General: She is not in acute distress.     Appearance: Normal appearance. She is ill-appearing. She is not toxic-appearing or diaphoretic.   HENT:      Head: Normocephalic and atraumatic.      Right Ear: Tympanic membrane, ear canal and external ear normal.      Left Ear: Tympanic membrane, ear canal and external ear normal.      Nose: Congestion present.      Mouth/Throat:      Mouth: Mucous membranes are moist.      Pharynx: Posterior oropharyngeal erythema present. No oropharyngeal exudate.   Eyes:      General: No scleral icterus.        Right eye: No discharge.         Left eye: No discharge.      Conjunctiva/sclera: Conjunctivae normal.      Pupils: Pupils are equal, round, and reactive to light.   Cardiovascular:      Rate and Rhythm: Normal rate and regular rhythm.      Heart sounds: Normal heart sounds. No murmur heard.     No friction rub. No gallop.   Pulmonary:      Effort: Pulmonary effort is normal. No respiratory distress.      Breath sounds: Normal breath sounds. No wheezing, rhonchi or rales.   Chest:      Chest wall: No tenderness.   Skin:     General: Skin is warm and dry.      Coloration: Skin is not pale.      Findings: No erythema.   Neurological:      General: No focal deficit present.      Mental Status: She is alert and oriented to person, place, and time. Mental status is at baseline.      Motor: No weakness.      Coordination: Coordination normal.   Psychiatric:         Mood and Affect: Mood normal.         Behavior: Behavior normal.           "

## 2024-02-27 NOTE — ASSESSMENT & PLAN NOTE
Flu A positive in office today  Patient symptoms started approximately 14 to 16 hours ago  Patient is within window for Tamiflu treatment  Discussed side effects of medication  Offered Zofran for nausea, patient states she still has some at home  Advised Flonase twice daily, second-generation antihistamine such as Zyrtec daily, decongestant daily  Advised increased rest, fluid intake  Discussed symptomatic care including salt water gargles for sore throat, steam showers for congestion, warm liquids   Patient can take Tylenol/ibuprofen for fevers and body aches  Discussed red flag symptoms including going to the ER with chest pain or shortness of breath  Discussed course of illness could be 1 to 2 weeks.   Return to the office for reevaluation if symptoms do not improve in 1-2 weeks

## 2024-03-05 ENCOUNTER — OFFICE VISIT (OUTPATIENT)
Age: 39
End: 2024-03-05
Payer: COMMERCIAL

## 2024-03-05 VITALS
DIASTOLIC BLOOD PRESSURE: 84 MMHG | SYSTOLIC BLOOD PRESSURE: 120 MMHG | HEIGHT: 64 IN | HEART RATE: 125 BPM | BODY MASS INDEX: 22.53 KG/M2 | OXYGEN SATURATION: 99 % | WEIGHT: 132 LBS | TEMPERATURE: 97.9 F

## 2024-03-05 DIAGNOSIS — J11.1 INFLUENZA: ICD-10-CM

## 2024-03-05 DIAGNOSIS — J06.9 UPPER RESPIRATORY TRACT INFECTION, UNSPECIFIED TYPE: Primary | ICD-10-CM

## 2024-03-05 DIAGNOSIS — J01.80 ACUTE NON-RECURRENT SINUSITIS OF OTHER SINUS: ICD-10-CM

## 2024-03-05 PROCEDURE — 99213 OFFICE O/P EST LOW 20 MIN: CPT | Performed by: NURSE PRACTITIONER

## 2024-03-05 RX ORDER — FLUTICASONE PROPIONATE 50 MCG
2 SPRAY, SUSPENSION (ML) NASAL DAILY
Qty: 18.2 ML | Refills: 5 | Status: SHIPPED | OUTPATIENT
Start: 2024-03-05

## 2024-03-05 RX ORDER — PREDNISONE 20 MG/1
40 TABLET ORAL DAILY
Qty: 10 TABLET | Refills: 0 | Status: SHIPPED | OUTPATIENT
Start: 2024-03-05 | End: 2024-03-10

## 2024-03-05 RX ORDER — BENZONATATE 100 MG/1
200 CAPSULE ORAL 3 TIMES DAILY PRN
Qty: 60 CAPSULE | Refills: 0 | Status: SHIPPED | OUTPATIENT
Start: 2024-03-05

## 2024-03-05 RX ORDER — FLUTICASONE PROPIONATE 50 MCG
2 SPRAY, SUSPENSION (ML) NASAL DAILY
Qty: 18.2 ML | Refills: 5 | Status: SHIPPED | OUTPATIENT
Start: 2024-03-05 | End: 2024-03-05 | Stop reason: SDUPTHER

## 2024-03-05 RX ORDER — BENZONATATE 100 MG/1
200 CAPSULE ORAL 3 TIMES DAILY PRN
Qty: 60 CAPSULE | Refills: 0 | Status: SHIPPED | OUTPATIENT
Start: 2024-03-05 | End: 2024-03-05 | Stop reason: SDUPTHER

## 2024-03-05 RX ORDER — AZITHROMYCIN 250 MG/1
TABLET, FILM COATED ORAL
Qty: 6 TABLET | Refills: 0 | Status: SHIPPED | OUTPATIENT
Start: 2024-03-05 | End: 2024-03-09

## 2024-03-05 NOTE — ASSESSMENT & PLAN NOTE
Start Prednisone and Z-pac   Rest and fluids advised.   Educated that the course of this illness could be 2-4 weeks.   Discussed symptomatic relief, such as warm steam inhalations, tylenol/ibuprofen for fevers and body aches, rest, and drink plenty of fluids.  Warm salt gargles for sore throat.   Discussed red flag signs to go to the ER, such as chest pain or shortness of breath.   Return to the office for reevaluation if symptoms worsen or do not improve in 1-2 weeks

## 2024-03-05 NOTE — PROGRESS NOTES
" Assessment/Plan:    Upper respiratory tract infection  Start Prednisone and Z-pac   Rest and fluids advised.   Educated that the course of this illness could be 2-4 weeks.   Discussed symptomatic relief, such as warm steam inhalations, tylenol/ibuprofen for fevers and body aches, rest, and drink plenty of fluids.  Warm salt gargles for sore throat.   Discussed red flag signs to go to the ER, such as chest pain or shortness of breath.   Return to the office for reevaluation if symptoms worsen or do not improve in 1-2 weeks               Diagnoses and all orders for this visit:    Upper respiratory tract infection, unspecified type  -     azithromycin (ZITHROMAX) 250 mg tablet; Take 2 tablets today then 1 tablet daily x 4 days  -     predniSONE 20 mg tablet; Take 2 tablets (40 mg total) by mouth daily for 5 days          Subjective:      Patient ID: Deedee Robins is a 39 y.o. female.    Patient presents today with ongoing flu like symptoms.     She was seen last week on 2/27 and was treated with tamiflu for the flu.   She reports ongoing symptoms of sore throat, she reports her throat hurts so bad that she could \"cry\".  Report SOB, cough, and ear pain         The following portions of the patient's history were reviewed and updated as appropriate: allergies, current medications, past family history, past medical history, past social history, past surgical history, and problem list.    Review of Systems   Constitutional:  Positive for fatigue. Negative for activity change, appetite change, chills, diaphoresis and fever.   HENT:  Positive for ear pain and sore throat. Negative for congestion, ear discharge, postnasal drip, rhinorrhea, sinus pressure and sinus pain.    Eyes:  Negative for pain, discharge, itching and visual disturbance.   Respiratory:  Positive for cough and shortness of breath. Negative for chest tightness and wheezing.    Cardiovascular:  Negative for chest pain, palpitations and leg swelling. "   Gastrointestinal:  Negative for abdominal pain, constipation, diarrhea, nausea and vomiting.   Endocrine: Negative for polydipsia, polyphagia and polyuria.   Genitourinary:  Negative for difficulty urinating, dysuria and urgency.   Musculoskeletal:  Negative for arthralgias, back pain and neck pain.   Skin:  Negative for rash and wound.   Neurological:  Negative for dizziness, weakness, numbness and headaches.         Past Medical History:   Diagnosis Date    Allergic     Gastroenteritis     last assessed: 08/04/16    Headache(784.0) 5/5/22    Hypothyroidism 01/25/2018    Plantar fasciitis, right     last assessed: 04/16/15    PONV (postoperative nausea and vomiting)     Seizures (HCC)     last assessed: 08/15/17    Thyroid disease     last assessed: 08/15/17    Viral gastroenteritis     last assessed: 03/27/17         Current Outpatient Medications:     azithromycin (ZITHROMAX) 250 mg tablet, Take 2 tablets today then 1 tablet daily x 4 days, Disp: 6 tablet, Rfl: 0    benzonatate (TESSALON PERLES) 100 mg capsule, Take 2 capsules (200 mg total) by mouth 3 (three) times a day as needed for cough, Disp: 20 capsule, Rfl: 0    cetirizine (ZyrTEC) 10 mg tablet, Take 1 tablet (10 mg total) by mouth daily, Disp: 90 tablet, Rfl: 0    ergocalciferol (VITAMIN D2) 50,000 units, Take 1 capsule (50,000 Units total) by mouth once a week, Disp: 12 capsule, Rfl: 3    fluticasone (FLONASE) 50 mcg/act nasal spray, 1 spray into each nostril daily (Patient taking differently: 1 spray into each nostril as needed), Disp: 18.2 mL, Rfl: 0    levothyroxine 50 mcg tablet, Take 1 tablet (50 mcg total) by mouth daily, Disp: 90 tablet, Rfl: 1    phentermine 15 MG capsule, Take 1 capsule (15 mg total) by mouth every morning, Disp: 90 capsule, Rfl: 0    predniSONE 20 mg tablet, Take 2 tablets (40 mg total) by mouth daily for 5 days, Disp: 10 tablet, Rfl: 0    valACYclovir (VALTREX) 1,000 mg tablet, Take 2 tablets (2,000 mg total) by mouth 2  "(two) times a day for 1 day, Disp: 4 tablet, Rfl: 1    Allergies   Allergen Reactions    Seasonal Ic [Cholestatin] Itching     Itchy eyes, congestion       Social History   Past Surgical History:   Procedure Laterality Date     SECTION      MOUTH SURGERY Bilateral     [place]  DELIVERY ONLY N/A 2018    Procedure:  SECTION () REPEAT;  Surgeon: Tenisha Forrester DO;  Location: [facility];  Service: Obstetrics    NC EXCISION GANGLION WRIST DORSAL/VOLAR PRIMARY Left 3/15/2018    Procedure: VOLAR WRIST GANGLION CYST EXCISION;  Surgeon: Meet Sheldon MD;  Location: [facility];  Service: Orthopedics    TUBAL LIGATION Bilateral 2018    Procedure: LIGATION/COAGULATION TUBAL;  Surgeon: Tenisha Forrester DO;  Location: [facility];  Service: Obstetrics    US BREAST SHERRIE  NEEDLE LOC RIGHT Right 2022    US GUIDED BREAST BIOPSY RIGHT COMPLETE Right 2022    WISDOM TOOTH EXTRACTION       Family History   Problem Relation Age of Onset    No Known Problems Mother     No Known Problems Father     Cancer Maternal Grandmother     Thyroid cancer Maternal Grandmother     Skin cancer Maternal Grandfather     Cancer Maternal Grandfather     Diabetes Paternal Grandmother     No Known Problems Paternal Grandfather        Objective:  /84 (BP Location: Left arm, Patient Position: Sitting, Cuff Size: Standard)   Pulse (!) 125   Temp 97.9 °F (36.6 °C) (Temporal)   Ht 5' 4.25\" (1.632 m)   Wt 59.9 kg (132 lb)   SpO2 99%   BMI 22.48 kg/m²     Recent Results (from the past 1344 hour(s))   Vitamin D 25 hydroxy    Collection Time: 24  8:58 AM   Result Value Ref Range    Vit D, 25-Hydroxy 28.6 (L) 30.0 - 100.0 ng/mL   CBC and differential    Collection Time: 24  8:58 AM   Result Value Ref Range    WBC 7.28 4.31 - 10.16 Thousand/uL    RBC 4.63 3.81 - 5.12 Million/uL    Hemoglobin 13.5 11.5 - 15.4 g/dL    Hematocrit 42.2 34.8 - 46.1 %    MCV 91 82 - 98 fL    MCH 29.2 26.8 - 34.3 pg    MCHC " 32.0 31.4 - 37.4 g/dL    RDW 12.6 11.6 - 15.1 %    MPV 11.4 8.9 - 12.7 fL    Platelets 251 149 - 390 Thousands/uL    nRBC 0 /100 WBCs    Neutrophils Relative 59 43 - 75 %    Immat GRANS % 0 0 - 2 %    Lymphocytes Relative 33 14 - 44 %    Monocytes Relative 6 4 - 12 %    Eosinophils Relative 1 0 - 6 %    Basophils Relative 1 0 - 1 %    Neutrophils Absolute 4.28 1.85 - 7.62 Thousands/µL    Immature Grans Absolute 0.01 0.00 - 0.20 Thousand/uL    Lymphocytes Absolute 2.40 0.60 - 4.47 Thousands/µL    Monocytes Absolute 0.46 0.17 - 1.22 Thousand/µL    Eosinophils Absolute 0.08 0.00 - 0.61 Thousand/µL    Basophils Absolute 0.05 0.00 - 0.10 Thousands/µL   Lipid panel    Collection Time: 01/30/24  8:58 AM   Result Value Ref Range    Cholesterol 120 See Comment mg/dL    Triglycerides 70 See Comment mg/dL    HDL, Direct 44 (L) >=50 mg/dL    LDL Calculated 62 0 - 100 mg/dL    Non-HDL-Chol (CHOL-HDL) 76 mg/dl   TSH, 3rd generation with Free T4 reflex    Collection Time: 01/30/24  8:58 AM   Result Value Ref Range    TSH 3RD GENERATON 1.137 0.450 - 4.500 uIU/mL   Comprehensive metabolic panel    Collection Time: 01/30/24  8:58 AM   Result Value Ref Range    Sodium 139 135 - 147 mmol/L    Potassium 4.7 3.5 - 5.3 mmol/L    Chloride 104 96 - 108 mmol/L    CO2 28 21 - 32 mmol/L    ANION GAP 7 mmol/L    BUN 14 5 - 25 mg/dL    Creatinine 0.91 0.60 - 1.30 mg/dL    Glucose, Fasting 92 65 - 99 mg/dL    Calcium 9.4 8.4 - 10.2 mg/dL    AST 20 13 - 39 U/L    ALT 18 7 - 52 U/L    Alkaline Phosphatase 57 34 - 104 U/L    Total Protein 7.1 6.4 - 8.4 g/dL    Albumin 4.3 3.5 - 5.0 g/dL    Total Bilirubin 0.40 0.20 - 1.00 mg/dL    eGFR 79 ml/min/1.73sq m   POCT Rapid Covid Ag    Collection Time: 02/27/24  1:54 PM   Result Value Ref Range    POCT SARS-CoV-2 Ag Negative Negative    VALID CONTROL Valid    POCT rapid flu A and B    Collection Time: 02/27/24  1:54 PM   Result Value Ref Range    RAPID FLU A positive     RAPID FLU B negative    POCT rapid  ANTIGEN strepA    Collection Time: 02/27/24  1:54 PM   Result Value Ref Range     RAPID STREP A Negative Negative            Physical Exam  Constitutional:       General: She is not in acute distress.     Appearance: She is well-developed. She is ill-appearing. She is not diaphoretic.   HENT:      Head: Normocephalic and atraumatic.      Right Ear: External ear normal. A middle ear effusion is present.      Left Ear: External ear normal. A middle ear effusion is present.      Nose: Nose normal.      Mouth/Throat:      Mouth: Mucous membranes are moist.      Pharynx: Posterior oropharyngeal erythema present. No oropharyngeal exudate.   Eyes:      General:         Right eye: No discharge.         Left eye: No discharge.      Conjunctiva/sclera: Conjunctivae normal.      Pupils: Pupils are equal, round, and reactive to light.   Neck:      Thyroid: No thyromegaly.   Cardiovascular:      Rate and Rhythm: Regular rhythm. Tachycardia present.      Heart sounds: Normal heart sounds. No murmur heard.     No friction rub. No gallop.   Pulmonary:      Effort: Pulmonary effort is normal. No respiratory distress.      Breath sounds: Normal breath sounds. No stridor. No wheezing or rales.   Abdominal:      General: Bowel sounds are normal. There is no distension.      Palpations: Abdomen is soft.      Tenderness: There is no abdominal tenderness.   Musculoskeletal:      Cervical back: Normal range of motion and neck supple.   Lymphadenopathy:      Head:      Right side of head: Submandibular adenopathy present.      Left side of head: Submandibular adenopathy present.      Cervical: No cervical adenopathy.   Skin:     General: Skin is warm and dry.      Findings: No erythema or rash.   Neurological:      Mental Status: She is alert and oriented to person, place, and time.   Psychiatric:         Behavior: Behavior normal.         Thought Content: Thought content normal.         Judgment: Judgment normal.

## 2024-03-05 NOTE — LETTER
March 5, 2024     Patient: Deedee Robins  YOB: 1985  Date of Visit: 3/5/2024      To Whom it May Concern:    Deedee Robins is under my professional care. eDedee was seen in my office on 3/5/2024. Deedee may return to work on 3/6/24 .    If you have any questions or concerns, please don't hesitate to call.         Sincerely,          HARSHA Hardin        CC: No Recipients

## 2024-03-06 DIAGNOSIS — J11.1 INFLUENZA: Primary | ICD-10-CM

## 2024-03-06 RX ORDER — ALBUTEROL SULFATE 90 UG/1
2 AEROSOL, METERED RESPIRATORY (INHALATION) EVERY 6 HOURS PRN
Qty: 18 G | Refills: 0 | Status: SHIPPED | OUTPATIENT
Start: 2024-03-06

## 2024-04-05 ENCOUNTER — OFFICE VISIT (OUTPATIENT)
Age: 39
End: 2024-04-05
Payer: COMMERCIAL

## 2024-04-05 VITALS
TEMPERATURE: 97.8 F | SYSTOLIC BLOOD PRESSURE: 120 MMHG | OXYGEN SATURATION: 99 % | BODY MASS INDEX: 27.39 KG/M2 | WEIGHT: 160.4 LBS | HEIGHT: 64 IN | DIASTOLIC BLOOD PRESSURE: 84 MMHG | HEART RATE: 82 BPM

## 2024-04-05 DIAGNOSIS — Z79.899 MEDICATION MANAGEMENT: ICD-10-CM

## 2024-04-05 PROCEDURE — 99213 OFFICE O/P EST LOW 20 MIN: CPT | Performed by: NURSE PRACTITIONER

## 2024-04-05 NOTE — PROGRESS NOTES
Assessment/Plan:    BMI 31.0-31.9,adult  -continue phentermine  -EKG reviewed  -current weight 160lbs, starting weight 182 lbs  -current BMI 27.32, starting BMI 31.10  -follow up in 3 months, encouraged well balanced diet and daily exercise              Diagnoses and all orders for this visit:    BMI 31.0-31.9,adult    Medication management          Subjective:      Patient ID: Deedee Robins is a 39 y.o. female.    Patient presents today to follow up on inability to lose weight.     She continues on phentermine.  She denies any side effects with this medication.    -current weight 160lbs, starting weight 182 lbs  -current BMI 27.32, starting BMI 31.10    She does report changes in her diet and increased exercise        The following portions of the patient's history were reviewed and updated as appropriate: allergies, current medications, past family history, past medical history, past social history, past surgical history, and problem list.    Review of Systems   Constitutional:  Negative for activity change, appetite change, chills, diaphoresis and fever.   HENT:  Negative for congestion, ear discharge, ear pain, postnasal drip, rhinorrhea, sinus pressure, sinus pain and sore throat.    Eyes:  Negative for pain, discharge, itching and visual disturbance.   Respiratory:  Negative for cough, chest tightness, shortness of breath and wheezing.    Cardiovascular:  Negative for chest pain, palpitations and leg swelling.   Gastrointestinal:  Negative for abdominal pain, constipation, diarrhea, nausea and vomiting.   Endocrine: Negative for polydipsia, polyphagia and polyuria.   Genitourinary:  Negative for difficulty urinating, dysuria and urgency.   Musculoskeletal:  Negative for arthralgias, back pain and neck pain.   Skin:  Negative for rash and wound.   Neurological:  Negative for dizziness, weakness, numbness and headaches.         Past Medical History:   Diagnosis Date    Allergic     Gastroenteritis      last assessed: 16    Headache(784.0) 22    Hypothyroidism 2018    Plantar fasciitis, right     last assessed: 04/16/15    PONV (postoperative nausea and vomiting)     Seizures (HCC)     last assessed: 08/15/17    Thyroid disease     last assessed: 08/15/17    Viral gastroenteritis     last assessed: 17         Current Outpatient Medications:     albuterol (ProAir HFA) 90 mcg/act inhaler, Inhale 2 puffs every 6 (six) hours as needed for wheezing, Disp: 18 g, Rfl: 0    cetirizine (ZyrTEC) 10 mg tablet, Take 1 tablet (10 mg total) by mouth daily, Disp: 90 tablet, Rfl: 0    ergocalciferol (VITAMIN D2) 50,000 units, Take 1 capsule (50,000 Units total) by mouth once a week, Disp: 12 capsule, Rfl: 3    fluticasone (FLONASE) 50 mcg/act nasal spray, 2 sprays into each nostril daily, Disp: 18.2 mL, Rfl: 5    levothyroxine 50 mcg tablet, Take 1 tablet (50 mcg total) by mouth daily, Disp: 90 tablet, Rfl: 1    phentermine 15 MG capsule, Take 1 capsule (15 mg total) by mouth every morning, Disp: 90 capsule, Rfl: 0    valACYclovir (VALTREX) 1,000 mg tablet, Take 2 tablets (2,000 mg total) by mouth 2 (two) times a day for 1 day, Disp: 4 tablet, Rfl: 1    benzonatate (TESSALON PERLES) 100 mg capsule, Take 2 capsules (200 mg total) by mouth 3 (three) times a day as needed for cough, Disp: 60 capsule, Rfl: 0    Allergies   Allergen Reactions    Seasonal Ic [Cholestatin] Itching     Itchy eyes, congestion       Social History   Past Surgical History:   Procedure Laterality Date     SECTION      MOUTH SURGERY Bilateral     [facility]  DELIVERY ONLY N/A 2018    Procedure:  SECTION () REPEAT;  Surgeon: Tenisha Forrester DO;  Location: [facility];  Service: Obstetrics    NM EXCISION GANGLION WRIST DORSAL/VOLAR PRIMARY Left 3/15/2018    Procedure: VOLAR WRIST GANGLION CYST EXCISION;  Surgeon: Meet Sheldon MD;  Location:  [facility];  Service: Orthopedics    TUBAL LIGATION Bilateral  "12/17/2018    Procedure: LIGATION/COAGULATION TUBAL;  Surgeon: Tenisha Forrester DO;  Location: [facility];  Service: Obstetrics    US BREAST CLIP NEEDLE LOC RIGHT Right 7/27/2022    US GUIDED BREAST BIOPSY RIGHT COMPLETE Right 7/27/2022    WISDOM TOOTH EXTRACTION       Family History   Problem Relation Age of Onset    No Known Problems Mother     No Known Problems Father     Cancer Maternal Grandmother     Thyroid cancer Maternal Grandmother     Skin cancer Maternal Grandfather     Cancer Maternal Grandfather     Diabetes Paternal Grandmother     No Known Problems Paternal Grandfather        Objective:  /84 (BP Location: Left arm, Patient Position: Sitting, Cuff Size: Standard)   Pulse 82   Temp 97.8 °F (36.6 °C) (Temporal)   Ht 5' 4.25\" (1.632 m)   Wt 72.8 kg (160 lb 6.4 oz)   SpO2 99%   BMI 27.32 kg/m²     Recent Results (from the past 1344 hour(s))   POCT Rapid Covid Ag    Collection Time: 02/27/24  1:54 PM   Result Value Ref Range    POCT SARS-CoV-2 Ag Negative Negative    VALID CONTROL Valid    POCT rapid flu A and B    Collection Time: 02/27/24  1:54 PM   Result Value Ref Range    RAPID FLU A positive     RAPID FLU B negative    POCT rapid ANTIGEN strepA    Collection Time: 02/27/24  1:54 PM   Result Value Ref Range     RAPID STREP A Negative Negative            Physical Exam  Constitutional:       General: She is not in acute distress.     Appearance: She is well-developed. She is not diaphoretic.   HENT:      Head: Normocephalic and atraumatic.      Right Ear: External ear normal.      Left Ear: External ear normal.      Nose: Nose normal.      Mouth/Throat:      Mouth: Mucous membranes are moist.      Pharynx: No oropharyngeal exudate or posterior oropharyngeal erythema.   Eyes:      General:         Right eye: No discharge.         Left eye: No discharge.      Conjunctiva/sclera: Conjunctivae normal.      Pupils: Pupils are equal, round, and reactive to light.   Neck:      Thyroid: No thyromegaly. "   Cardiovascular:      Rate and Rhythm: Normal rate and regular rhythm.      Heart sounds: Normal heart sounds. No murmur heard.     No friction rub. No gallop.   Pulmonary:      Effort: Pulmonary effort is normal. No respiratory distress.      Breath sounds: Normal breath sounds. No stridor. No wheezing or rales.   Abdominal:      General: Bowel sounds are normal. There is no distension.      Palpations: Abdomen is soft.      Tenderness: There is no abdominal tenderness.   Musculoskeletal:      Cervical back: Normal range of motion and neck supple.   Lymphadenopathy:      Cervical: No cervical adenopathy.   Skin:     General: Skin is warm and dry.      Findings: No erythema or rash.   Neurological:      Mental Status: She is alert and oriented to person, place, and time.   Psychiatric:         Behavior: Behavior normal.         Thought Content: Thought content normal.         Judgment: Judgment normal.

## 2024-04-05 NOTE — ASSESSMENT & PLAN NOTE
-continue phentermine  -EKG reviewed  -current weight 160lbs, starting weight 182 lbs  -current BMI 27.32, starting BMI 31.10  -follow up in 3 months, encouraged well balanced diet and daily exercise

## 2024-04-16 RX ORDER — PHENTERMINE HYDROCHLORIDE 15 MG/1
15 CAPSULE ORAL EVERY MORNING
Qty: 90 CAPSULE | Refills: 0 | Status: SHIPPED | OUTPATIENT
Start: 2024-04-16

## 2024-04-17 ENCOUNTER — APPOINTMENT (OUTPATIENT)
Dept: LAB | Facility: IMAGING CENTER | Age: 39
End: 2024-04-17

## 2024-04-17 DIAGNOSIS — Z00.8 HEALTH EXAMINATION IN POPULATION SURVEY: ICD-10-CM

## 2024-04-17 LAB
CHOLEST SERPL-MCNC: 135 MG/DL
HDLC SERPL-MCNC: 53 MG/DL
LDLC SERPL CALC-MCNC: 64 MG/DL (ref 0–100)
NONHDLC SERPL-MCNC: 82 MG/DL
TRIGL SERPL-MCNC: 89 MG/DL

## 2024-04-17 PROCEDURE — 80061 LIPID PANEL: CPT

## 2024-04-17 PROCEDURE — 83036 HEMOGLOBIN GLYCOSYLATED A1C: CPT

## 2024-04-17 PROCEDURE — 36415 COLL VENOUS BLD VENIPUNCTURE: CPT

## 2024-04-18 LAB
EST. AVERAGE GLUCOSE BLD GHB EST-MCNC: 108 MG/DL
HBA1C MFR BLD: 5.4 %

## 2024-04-27 PROBLEM — J11.1 INFLUENZA: Status: RESOLVED | Noted: 2024-02-27 | Resolved: 2024-04-27

## 2024-06-07 ENCOUNTER — OFFICE VISIT (OUTPATIENT)
Age: 39
End: 2024-06-07
Payer: COMMERCIAL

## 2024-06-07 VITALS
TEMPERATURE: 97.8 F | SYSTOLIC BLOOD PRESSURE: 130 MMHG | OXYGEN SATURATION: 97 % | DIASTOLIC BLOOD PRESSURE: 86 MMHG | WEIGHT: 153.8 LBS | HEART RATE: 96 BPM | HEIGHT: 63 IN | BODY MASS INDEX: 27.25 KG/M2

## 2024-06-07 DIAGNOSIS — T78.40XA ALLERGY, INITIAL ENCOUNTER: Primary | ICD-10-CM

## 2024-06-07 DIAGNOSIS — J30.2 SEASONAL ALLERGIES: ICD-10-CM

## 2024-06-07 DIAGNOSIS — E03.9 HYPOTHYROIDISM, UNSPECIFIED TYPE: ICD-10-CM

## 2024-06-07 PROCEDURE — 99213 OFFICE O/P EST LOW 20 MIN: CPT | Performed by: NURSE PRACTITIONER

## 2024-06-07 RX ORDER — PREDNISONE 20 MG/1
40 TABLET ORAL DAILY
Qty: 10 TABLET | Refills: 0 | Status: SHIPPED | OUTPATIENT
Start: 2024-06-07 | End: 2024-06-12

## 2024-06-07 RX ORDER — MONTELUKAST SODIUM 10 MG/1
10 TABLET ORAL
Start: 2024-06-07

## 2024-06-07 RX ORDER — LEVOTHYROXINE SODIUM 0.05 MG/1
50 TABLET ORAL DAILY
Qty: 90 TABLET | Refills: 1 | Status: SHIPPED | OUTPATIENT
Start: 2024-06-07 | End: 2024-06-07 | Stop reason: SDUPTHER

## 2024-06-07 RX ORDER — LEVOTHYROXINE SODIUM 0.05 MG/1
50 TABLET ORAL DAILY
Qty: 90 TABLET | Refills: 1 | Status: SHIPPED | OUTPATIENT
Start: 2024-06-07

## 2024-06-07 NOTE — ASSESSMENT & PLAN NOTE
-Continue Zyrtec, Flonase and Singulair  -Start short burst of prednisone  -Consider allergy testing and referral to allergist

## 2024-06-07 NOTE — PROGRESS NOTES
Assessment/Plan:    Seasonal allergies  -Continue Zyrtec, Flonase and Singulair  -Start short burst of prednisone  -Consider allergy testing and referral to allergist              Diagnoses and all orders for this visit:    Allergy, initial encounter  -     predniSONE 20 mg tablet; Take 2 tablets (40 mg total) by mouth daily for 5 days  -     montelukast (SINGULAIR) 10 mg tablet; Take 1 tablet (10 mg total) by mouth daily at bedtime    Hypothyroidism, unspecified type  -     Discontinue: levothyroxine 50 mcg tablet; Take 1 tablet (50 mcg total) by mouth daily  -     levothyroxine 50 mcg tablet; Take 1 tablet (50 mcg total) by mouth daily    Seasonal allergies          Subjective:      Patient ID: Deedee Robins is a 39 y.o. female.    Patient presents today with seasonal allergy-like symptoms.  She reports approximately 2 weeks ago she started with a dry cough, and throat congestion.  Denies sick contacts  She has been using Zyrtec Flonase and Singulair for seasonal allergies  Denies fevers and chills, denies shortness of breath or chest pain        The following portions of the patient's history were reviewed and updated as appropriate: allergies, current medications, past family history, past medical history, past social history, past surgical history, and problem list.    Review of Systems   Constitutional:  Negative for activity change, appetite change, chills, diaphoresis and fever.   HENT:  Positive for congestion. Negative for ear discharge, ear pain, postnasal drip, rhinorrhea, sinus pressure, sinus pain and sore throat.    Eyes:  Negative for pain, discharge, itching and visual disturbance.   Respiratory:  Positive for cough. Negative for chest tightness, shortness of breath and wheezing.    Cardiovascular:  Negative for chest pain, palpitations and leg swelling.   Gastrointestinal:  Negative for abdominal pain, constipation, diarrhea, nausea and vomiting.   Endocrine: Negative for polydipsia,  polyphagia and polyuria.   Genitourinary:  Negative for difficulty urinating, dysuria and urgency.   Musculoskeletal:  Negative for arthralgias, back pain and neck pain.   Skin:  Negative for rash and wound.   Neurological:  Negative for dizziness, weakness, numbness and headaches.         Past Medical History:   Diagnosis Date    Allergic     Gastroenteritis     last assessed: 08/04/16    Headache(784.0) 5/5/22    Hypothyroidism 01/25/2018    Plantar fasciitis, right     last assessed: 04/16/15    PONV (postoperative nausea and vomiting)     Seizures (HCC)     last assessed: 08/15/17    Thyroid disease     last assessed: 08/15/17    Viral gastroenteritis     last assessed: 03/27/17         Current Outpatient Medications:     albuterol (ProAir HFA) 90 mcg/act inhaler, Inhale 2 puffs every 6 (six) hours as needed for wheezing, Disp: 18 g, Rfl: 0    benzonatate (TESSALON PERLES) 100 mg capsule, Take 2 capsules (200 mg total) by mouth 3 (three) times a day as needed for cough, Disp: 60 capsule, Rfl: 0    cetirizine (ZyrTEC) 10 mg tablet, Take 1 tablet (10 mg total) by mouth daily, Disp: 90 tablet, Rfl: 0    ergocalciferol (VITAMIN D2) 50,000 units, Take 1 capsule (50,000 Units total) by mouth once a week, Disp: 12 capsule, Rfl: 3    fluticasone (FLONASE) 50 mcg/act nasal spray, 2 sprays into each nostril daily, Disp: 18.2 mL, Rfl: 5    levothyroxine 50 mcg tablet, Take 1 tablet (50 mcg total) by mouth daily, Disp: 90 tablet, Rfl: 1    montelukast (SINGULAIR) 10 mg tablet, Take 1 tablet (10 mg total) by mouth daily at bedtime, Disp: , Rfl:     phentermine 15 MG capsule, Take 1 capsule (15 mg total) by mouth every morning, Disp: 90 capsule, Rfl: 0    predniSONE 20 mg tablet, Take 2 tablets (40 mg total) by mouth daily for 5 days, Disp: 10 tablet, Rfl: 0    valACYclovir (VALTREX) 1,000 mg tablet, Take 2 tablets (2,000 mg total) by mouth 2 (two) times a day for 1 day, Disp: 4 tablet, Rfl: 1    Allergies   Allergen  "Reactions    Seasonal Ic [Cholestatin] Itching     Itchy eyes, congestion       Social History   Past Surgical History:   Procedure Laterality Date     SECTION      MOUTH SURGERY Bilateral     FL  DELIVERY ONLY N/A 2018    Procedure:  SECTION () REPEAT;  Surgeon: Tenisha Forrester DO;  Location: [facility];  Service: Obstetrics    FL EXCISION GANGLION WRIST DORSAL/VOLAR PRIMARY Left 3/15/2018    Procedure: VOLAR WRIST GANGLION CYST EXCISION;  Surgeon: Meet Sheldon MD;  Location: [facility];  Service: Orthopedics    TUBAL LIGATION Bilateral 2018    Procedure: LIGATION/COAGULATION TUBAL;  Surgeon: Tenisha Forrester DO;  Location: [facility];  Service: Obstetrics    US BREAST CLIP NEEDLE LOC RIGHT Right 2022    US GUIDED BREAST BIOPSY RIGHT COMPLETE Right 2022    WISDOM TOOTH EXTRACTION       Family History   Problem Relation Age of Onset    No Known Problems Mother     No Known Problems Father     Cancer Maternal Grandmother     Thyroid cancer Maternal Grandmother     Skin cancer Maternal Grandfather     Cancer Maternal Grandfather     Diabetes Paternal Grandmother     No Known Problems Paternal Grandfather        Objective:  /86 (BP Location: Left arm, Patient Position: Sitting, Cuff Size: Standard)   Pulse 96   Temp 97.8 °F (36.6 °C) (Temporal)   Ht 5' 2.72\" (1.593 m)   Wt 69.8 kg (153 lb 12.8 oz)   SpO2 97%   BMI 27.49 kg/m²     Recent Results (from the past 1344 hour(s))   Hemoglobin A1C    Collection Time: 24  8:54 AM   Result Value Ref Range    Hemoglobin A1C 5.4 Normal 4.0-5.6%; PreDiabetic 5.7-6.4%; Diabetic >=6.5%; Glycemic control for adults with diabetes <7.0% %     mg/dl   Lipid panel    Collection Time: 24  8:54 AM   Result Value Ref Range    Cholesterol 135 See Comment mg/dL    Triglycerides 89 See Comment mg/dL    HDL, Direct 53 >=50 mg/dL    LDL Calculated 64 0 - 100 mg/dL    Non-HDL-Chol (CHOL-HDL) 82 mg/dl            Physical " Exam  Constitutional:       General: She is not in acute distress.     Appearance: She is well-developed. She is not diaphoretic.   HENT:      Head: Normocephalic and atraumatic.      Right Ear: External ear normal.      Left Ear: External ear normal.      Nose: Nose normal.      Mouth/Throat:      Mouth: Mucous membranes are moist.      Pharynx: No oropharyngeal exudate or posterior oropharyngeal erythema.   Eyes:      General:         Right eye: No discharge.         Left eye: No discharge.      Conjunctiva/sclera: Conjunctivae normal.      Pupils: Pupils are equal, round, and reactive to light.   Neck:      Thyroid: No thyromegaly.   Cardiovascular:      Rate and Rhythm: Normal rate and regular rhythm.      Heart sounds: Normal heart sounds. No murmur heard.     No friction rub. No gallop.   Pulmonary:      Effort: Pulmonary effort is normal. No respiratory distress.      Breath sounds: Normal breath sounds. No stridor. No wheezing or rales.   Abdominal:      General: Bowel sounds are normal. There is no distension.      Palpations: Abdomen is soft.      Tenderness: There is no abdominal tenderness.   Musculoskeletal:      Cervical back: Normal range of motion and neck supple.   Lymphadenopathy:      Cervical: No cervical adenopathy.   Skin:     General: Skin is warm and dry.      Findings: No erythema or rash.   Neurological:      Mental Status: She is alert and oriented to person, place, and time.   Psychiatric:         Behavior: Behavior normal.         Thought Content: Thought content normal.         Judgment: Judgment normal.

## 2024-06-14 DIAGNOSIS — J06.9 UPPER RESPIRATORY TRACT INFECTION, UNSPECIFIED TYPE: ICD-10-CM

## 2024-06-14 DIAGNOSIS — J06.9 UPPER RESPIRATORY TRACT INFECTION, UNSPECIFIED TYPE: Primary | ICD-10-CM

## 2024-06-14 RX ORDER — AMOXICILLIN AND CLAVULANATE POTASSIUM 875; 125 MG/1; MG/1
1 TABLET, FILM COATED ORAL EVERY 12 HOURS SCHEDULED
Qty: 14 TABLET | Refills: 0 | Status: SHIPPED | OUTPATIENT
Start: 2024-06-14 | End: 2024-06-14 | Stop reason: SDUPTHER

## 2024-06-14 RX ORDER — AMOXICILLIN AND CLAVULANATE POTASSIUM 875; 125 MG/1; MG/1
1 TABLET, FILM COATED ORAL EVERY 12 HOURS SCHEDULED
Qty: 14 TABLET | Refills: 0 | Status: SHIPPED | OUTPATIENT
Start: 2024-06-14 | End: 2024-06-21

## 2024-06-18 ENCOUNTER — OFFICE VISIT (OUTPATIENT)
Dept: INTERNAL MEDICINE CLINIC | Facility: OTHER | Age: 39
End: 2024-06-18
Payer: COMMERCIAL

## 2024-06-18 VITALS
HEART RATE: 80 BPM | BODY MASS INDEX: 28.06 KG/M2 | WEIGHT: 157 LBS | SYSTOLIC BLOOD PRESSURE: 118 MMHG | DIASTOLIC BLOOD PRESSURE: 62 MMHG | TEMPERATURE: 98.5 F | OXYGEN SATURATION: 99 %

## 2024-06-18 DIAGNOSIS — J20.9 ACUTE BRONCHITIS, UNSPECIFIED ORGANISM: ICD-10-CM

## 2024-06-18 PROBLEM — J06.9 UPPER RESPIRATORY TRACT INFECTION: Status: RESOLVED | Noted: 2021-05-03 | Resolved: 2024-06-18

## 2024-06-18 PROCEDURE — 94640 AIRWAY INHALATION TREATMENT: CPT

## 2024-06-18 PROCEDURE — 99213 OFFICE O/P EST LOW 20 MIN: CPT

## 2024-06-18 RX ORDER — FLUTICASONE PROPIONATE AND SALMETEROL 100; 50 UG/1; UG/1
1 POWDER RESPIRATORY (INHALATION) 2 TIMES DAILY
Qty: 60 BLISTER | Refills: 5 | Status: SHIPPED | OUTPATIENT
Start: 2024-06-18 | End: 2024-12-15

## 2024-06-18 RX ORDER — DEXTROMETHORPHAN HYDROBROMIDE AND PROMETHAZINE HYDROCHLORIDE 15; 6.25 MG/5ML; MG/5ML
2.5 SYRUP ORAL 4 TIMES DAILY PRN
Qty: 118 ML | Refills: 0 | Status: SHIPPED | OUTPATIENT
Start: 2024-06-18

## 2024-06-18 RX ORDER — ALBUTEROL SULFATE 2.5 MG/3ML
2.5 SOLUTION RESPIRATORY (INHALATION) ONCE
Status: COMPLETED | OUTPATIENT
Start: 2024-06-18 | End: 2024-06-18

## 2024-06-18 RX ORDER — ALBUTEROL SULFATE 90 UG/1
2 AEROSOL, METERED RESPIRATORY (INHALATION) EVERY 6 HOURS PRN
Qty: 18 G | Refills: 0 | Status: SHIPPED | OUTPATIENT
Start: 2024-06-18 | End: 2024-06-18

## 2024-06-18 RX ORDER — PREDNISONE 20 MG/1
40 TABLET ORAL DAILY
Qty: 10 TABLET | Refills: 0 | Status: SHIPPED | OUTPATIENT
Start: 2024-06-18 | End: 2024-06-23

## 2024-06-18 RX ADMIN — ALBUTEROL SULFATE 2.5 MG: 2.5 SOLUTION RESPIRATORY (INHALATION) at 13:03

## 2024-06-18 NOTE — PROGRESS NOTES
Mini neb    Performed by: Santa Galvez LPN  Authorized by: Melani Willams PA-C  Universal Protocol:  Consent: Verbal consent obtained.  Risks and benefits: risks, benefits and alternatives were discussed  Consent given by: patient  Patient understanding: patient states understanding of the procedure being performed  Required items: required blood products, implants, devices, and special equipment available  Patient identity confirmed: verbally with patient    Number of treatments:  1  Treatment 1:   Pre-Procedure     Symptoms:  Wheezing, shortness of breath and cough    Lung Sounds:  Diffuse wheezing    Medication Administered:  Albuterol 2.5 mg  Post-Procedure     Symptoms:  Wheezing and cough    Lung sounds:  Wheezing b/l lower lung bases, improved

## 2024-06-18 NOTE — PROGRESS NOTES
Assessment/Plan:    1. Acute bronchitis, unspecified organism  Assessment & Plan:  Diffuse bilateral upper and lower wheezing  Albuterol nebulizer given in office today  Continue antibiotics to completion  Will also order prednisone 40 mg x 5 days  Advair inhaler to be used twice daily, albuterol as needed for shortness of breath or wheezing  Continue Flonase, Zyrtec, saline nasal sprays, Singulair  May consider PFTs following resolution of illness for evaluation of obstructive lung disease such as asthma  Follow-up in office is symptoms return or fail to improve  Orders:  -     predniSONE 20 mg tablet; Take 2 tablets (40 mg total) by mouth daily for 5 days  -     Fluticasone-Salmeterol (Advair) 100-50 mcg/dose inhaler; Inhale 1 puff 2 (two) times a day Rinse mouth after use.  -     albuterol inhalation solution 2.5 mg  -     Mini neb  -     promethazine-dextromethorphan (PHENERGAN-DM) 6.25-15 mg/5 mL oral syrup; Take 2.5 mL by mouth 4 (four) times a day as needed for cough            [employer] software was used to dictate this note.  It may contain errors with dictating incorrect words or incorrect spelling. Please contact the provider directly with any questions.    Subjective:      Patient ID: Deedee Robins is a 39 y.o. female.    Patient is a 39-year-old female presenting to the office with URI symptoms  She is endorsing cough, shortness of breath     On 6/7/2024 she was seen in office for suspected allergies and advised to start Zyrtec, Flonase, Singulair as well as prednisone 40 mg x 5 days which did not help  She was not improving, so she was given Augmentin twice daily x 7 days, 4 days in with no improvement    Cough  This is a new problem. The current episode started in the past 7 days. Associated symptoms include postnasal drip, shortness of breath and wheezing. Pertinent negatives include no chest pain, chills, eye redness, fever, headaches, rhinorrhea or sore throat.           The following  portions of the patient's history were reviewed and updated as appropriate: allergies, current medications, past family history, past medical history, past social history, past surgical history, and problem list.    Review of Systems   Constitutional:  Negative for chills, fatigue and fever.   HENT:  Positive for congestion and postnasal drip. Negative for rhinorrhea, sinus pressure, sinus pain, sore throat and trouble swallowing.    Eyes:  Negative for discharge and redness.   Respiratory:  Positive for cough, shortness of breath and wheezing. Negative for chest tightness.    Cardiovascular:  Negative for chest pain and palpitations.   Gastrointestinal:  Negative for abdominal pain, nausea and vomiting.   Neurological:  Negative for dizziness, light-headedness and headaches.         Past Medical History:   Diagnosis Date    Allergic     Gastroenteritis     last assessed: 08/04/16    Headache(784.0) 5/5/22    Hypothyroidism 01/25/2018    Plantar fasciitis, right     last assessed: 04/16/15    PONV (postoperative nausea and vomiting)     Seizures (HCC)     last assessed: 08/15/17    Thyroid disease     last assessed: 08/15/17    Viral gastroenteritis     last assessed: 03/27/17         Current Outpatient Medications:     amoxicillin-clavulanate (AUGMENTIN) 875-125 mg per tablet, Take 1 tablet by mouth every 12 (twelve) hours for 7 days, Disp: 14 tablet, Rfl: 0    benzonatate (TESSALON PERLES) 100 mg capsule, Take 2 capsules (200 mg total) by mouth 3 (three) times a day as needed for cough, Disp: 60 capsule, Rfl: 0    cetirizine (ZyrTEC) 10 mg tablet, Take 1 tablet (10 mg total) by mouth daily, Disp: 90 tablet, Rfl: 0    ergocalciferol (VITAMIN D2) 50,000 units, Take 1 capsule (50,000 Units total) by mouth once a week, Disp: 12 capsule, Rfl: 3    fluticasone (FLONASE) 50 mcg/act nasal spray, 2 sprays into each nostril daily, Disp: 18.2 mL, Rfl: 5    Fluticasone-Salmeterol (Advair) 100-50 mcg/dose inhaler, Inhale 1  puff 2 (two) times a day Rinse mouth after use., Disp: 60 blister, Rfl: 5    levothyroxine 50 mcg tablet, Take 1 tablet (50 mcg total) by mouth daily, Disp: 90 tablet, Rfl: 1    montelukast (SINGULAIR) 10 mg tablet, Take 1 tablet (10 mg total) by mouth daily at bedtime, Disp: , Rfl:     phentermine 15 MG capsule, Take 1 capsule (15 mg total) by mouth every morning, Disp: 90 capsule, Rfl: 0    predniSONE 20 mg tablet, Take 2 tablets (40 mg total) by mouth daily for 5 days, Disp: 10 tablet, Rfl: 0    promethazine-dextromethorphan (PHENERGAN-DM) 6.25-15 mg/5 mL oral syrup, Take 2.5 mL by mouth 4 (four) times a day as needed for cough, Disp: 118 mL, Rfl: 0    valACYclovir (VALTREX) 1,000 mg tablet, Take 2 tablets (2,000 mg total) by mouth 2 (two) times a day for 1 day (Patient taking differently: Take 2,000 mg by mouth if needed), Disp: 4 tablet, Rfl: 1    Current Facility-Administered Medications:     albuterol inhalation solution 2.5 mg, 2.5 mg, Nebulization, Once, Melani Willams PA-C    Allergies   Allergen Reactions    Seasonal Ic [Cholestatin] Itching     Itchy eyes, congestion       Social History   Past Surgical History:   Procedure Laterality Date     SECTION      MOUTH SURGERY Bilateral     [place]  DELIVERY ONLY N/A 2018    Procedure:  SECTION () REPEAT;  Surgeon: Tenisha Forrester DO;  Location: [facility];  Service: Obstetrics    NE EXCISION GANGLION WRIST DORSAL/VOLAR PRIMARY Left 3/15/2018    Procedure: VOLAR WRIST GANGLION CYST EXCISION;  Surgeon: Meet Sheldon MD;  [facility]: [facility];  Service: Orthopedics    TUBAL LIGATION Bilateral 2018    Procedure: LIGATION/COAGULATION TUBAL;  Surgeon: Tenisha Forrester DO;  Location: [facility];  Service: Obstetrics    US BREAST CLIP NEEDLE LOC RIGHT Right 2022    US GUIDED BREAST BIOPSY RIGHT COMPLETE Right 2022    WISDOM TOOTH EXTRACTION       Family History   Problem Relation Age of Onset    No Known Problems Mother     No  Known Problems Father     Cancer Maternal Grandmother     Thyroid cancer Maternal Grandmother     Skin cancer Maternal Grandfather     Cancer Maternal Grandfather     Diabetes Paternal Grandmother     No Known Problems Paternal Grandfather        Objective:  /62 (BP Location: Left arm, Patient Position: Sitting, Cuff Size: Standard)   Pulse 80   Temp 98.5 °F (36.9 °C) (Temporal)   Wt 71.2 kg (157 lb)   SpO2 99%   BMI 28.06 kg/m²      Physical Exam  Vitals and nursing note reviewed.   Constitutional:       General: She is not in acute distress.     Appearance: Normal appearance. She is ill-appearing.   HENT:      Head: Normocephalic and atraumatic.      Right Ear: Tympanic membrane, ear canal and external ear normal.      Left Ear: Tympanic membrane, ear canal and external ear normal.      Nose: Nose normal.      Mouth/Throat:      Mouth: Mucous membranes are moist.      Pharynx: Oropharynx is clear. No posterior oropharyngeal erythema.   Eyes:      General: No scleral icterus.     Conjunctiva/sclera: Conjunctivae normal.      Pupils: Pupils are equal, round, and reactive to light.   Cardiovascular:      Rate and Rhythm: Normal rate and regular rhythm.      Heart sounds: Normal heart sounds. No murmur heard.     No friction rub. No gallop.   Pulmonary:      Effort: Pulmonary effort is normal. No respiratory distress.      Breath sounds: Wheezing present. No rhonchi or rales.      Comments: Cough present throughout examination  Diffuse wheezing initially  Wheezing still present bilaterally lower lung bases, improved following nebulizer  Chest:      Chest wall: No tenderness.   Musculoskeletal:      Cervical back: Normal range of motion.   Skin:     General: Skin is warm and dry.   Neurological:      General: No focal deficit present.      Mental Status: She is alert and oriented to person, place, and time. Mental status is at baseline.   Psychiatric:         Mood and Affect: Mood normal.         Behavior:  Behavior normal.           Disclaimer: This note was generated with voice recognition software.  Phonetic, grammatical, and spelling errors may be present as a result.  Please contact provider with any concerns or questions

## 2024-06-18 NOTE — ASSESSMENT & PLAN NOTE
Diffuse bilateral upper and lower wheezing  Albuterol nebulizer given in office today  Continue antibiotics to completion  Will also order prednisone 40 mg x 5 days  Advair inhaler to be used twice daily, albuterol as needed for shortness of breath or wheezing  Continue Flonase, Zyrtec, saline nasal sprays, Singulair  May consider PFTs following resolution of illness for evaluation of obstructive lung disease such as asthma  Follow-up in office is symptoms return or fail to improve

## 2024-08-01 DIAGNOSIS — Z00.6 ENCOUNTER FOR EXAMINATION FOR NORMAL COMPARISON OR CONTROL IN CLINICAL RESEARCH PROGRAM: ICD-10-CM

## 2024-08-05 ENCOUNTER — OFFICE VISIT (OUTPATIENT)
Age: 39
End: 2024-08-05
Payer: COMMERCIAL

## 2024-08-05 VITALS
BODY MASS INDEX: 27.75 KG/M2 | HEIGHT: 63 IN | WEIGHT: 156.6 LBS | SYSTOLIC BLOOD PRESSURE: 138 MMHG | HEART RATE: 94 BPM | TEMPERATURE: 98.2 F | DIASTOLIC BLOOD PRESSURE: 90 MMHG | OXYGEN SATURATION: 99 %

## 2024-08-05 DIAGNOSIS — J30.2 SEASONAL ALLERGIES: ICD-10-CM

## 2024-08-05 DIAGNOSIS — Z12.4 CERVICAL CANCER SCREENING: ICD-10-CM

## 2024-08-05 DIAGNOSIS — R79.89 ABNORMAL TSH: ICD-10-CM

## 2024-08-05 DIAGNOSIS — E03.9 HYPOTHYROIDISM, UNSPECIFIED TYPE: ICD-10-CM

## 2024-08-05 DIAGNOSIS — R03.0 ELEVATED BP WITHOUT DIAGNOSIS OF HYPERTENSION: ICD-10-CM

## 2024-08-05 DIAGNOSIS — G40.909 SEIZURE DISORDER (HCC): ICD-10-CM

## 2024-08-05 DIAGNOSIS — N87.0 MILD DYSPLASIA OF CERVIX (CIN I): ICD-10-CM

## 2024-08-05 DIAGNOSIS — E55.9 VITAMIN D DEFICIENCY: ICD-10-CM

## 2024-08-05 DIAGNOSIS — Z13.228 SCREENING FOR METABOLIC DISORDER: ICD-10-CM

## 2024-08-05 DIAGNOSIS — E78.5 HYPERLIPIDEMIA, UNSPECIFIED HYPERLIPIDEMIA TYPE: ICD-10-CM

## 2024-08-05 DIAGNOSIS — F41.9 ANXIETY: Primary | ICD-10-CM

## 2024-08-05 DIAGNOSIS — R92.8 ABNORMAL MAMMOGRAM: ICD-10-CM

## 2024-08-05 PROBLEM — M67.432 GANGLION CYST OF VOLAR ASPECT OF LEFT WRIST: Status: RESOLVED | Noted: 2018-03-15 | Resolved: 2024-08-05

## 2024-08-05 PROBLEM — J20.9 ACUTE BRONCHITIS: Status: RESOLVED | Noted: 2024-06-18 | Resolved: 2024-08-05

## 2024-08-05 PROBLEM — M79.672 PAIN OF LEFT HEEL: Status: RESOLVED | Noted: 2020-09-28 | Resolved: 2024-08-05

## 2024-08-05 PROCEDURE — 99214 OFFICE O/P EST MOD 30 MIN: CPT | Performed by: NURSE PRACTITIONER

## 2024-08-05 RX ORDER — HYDROXYZINE HYDROCHLORIDE 25 MG/1
25 TABLET, FILM COATED ORAL EVERY 6 HOURS PRN
Qty: 30 TABLET | Refills: 0 | Status: SHIPPED | OUTPATIENT
Start: 2024-08-05

## 2024-08-05 RX ORDER — LEVOTHYROXINE SODIUM 0.05 MG/1
50 TABLET ORAL DAILY
Qty: 90 TABLET | Refills: 1 | Status: SHIPPED | OUTPATIENT
Start: 2024-08-05

## 2024-08-05 RX ORDER — PHENTERMINE HYDROCHLORIDE 15 MG/1
30 CAPSULE ORAL EVERY MORNING
Qty: 180 CAPSULE | Refills: 0 | Status: SHIPPED | OUTPATIENT
Start: 2024-08-05

## 2024-08-05 NOTE — ASSESSMENT & PLAN NOTE
Patient's most recent seizure 2011, patient currently not on seizure medication, has seen neurology in the past.

## 2024-08-05 NOTE — ASSESSMENT & PLAN NOTE
-continue phentermine-increase dose to 30mg  -BP is elevated while in office today see plan for elevated BP   -EKG reviewed  -current weight 156lbs, starting weight 182 lbs  -current BMI 27.99, starting BMI 31.10  -follow up in 6 months, encouraged well balanced diet and daily exercise

## 2024-08-05 NOTE — PROGRESS NOTES
Assessment/Plan:    Hypothyroidism    Will get updated TSH, will continue with levothyroxine 50 mg tablet daily.    Seizure disorder (HCC)  Patient's most recent seizure 2011, patient currently not on seizure medication, has seen neurology in the past.    Mild dysplasia of cervix (DAVINA I)  Advised patient to follow-up with gyn, patient due for cervical cancer screening as well as advised patient to do monthly self-breast exams.    Abnormal mammogram  -Scheduled for mammogram    Vitamin D deficiency  -Continue with supplementation    Seasonal allergies  -Continue Zyrtec, Flonase and Singulair  -Start short burst of prednisone  -Consider allergy testing and referral to allergist    Elevated BP without diagnosis of hypertension  -Blood pressure elevated while in office today however patient reports that she is a little stressed out, will have her come in on Wednesday to repeat blood pressure if so may need to consider discontinuing phentermine.    BMI 31.0-31.9,adult  -continue phentermine-increase dose to 30mg  -BP is elevated while in office today see plan for elevated BP   -EKG reviewed  -current weight 156lbs, starting weight 182 lbs  -current BMI 27.99, starting BMI 31.10  -follow up in 6 months, encouraged well balanced diet and daily exercise    Anxiety  -start PRN Atarax               Diagnoses and all orders for this visit:    Anxiety  -     hydrOXYzine HCL (ATARAX) 25 mg tablet; Take 1 tablet (25 mg total) by mouth every 6 (six) hours as needed for anxiety    Hypothyroidism, unspecified type  -     levothyroxine 50 mcg tablet; Take 1 tablet (50 mcg total) by mouth daily  -     TSH, 3rd generation with Free T4 reflex    BMI 31.0-31.9,adult  -     phentermine 15 MG capsule; Take 2 capsules (30 mg total) by mouth every morning    Screening for metabolic disorder  -     Comprehensive metabolic panel; Future  -     CBC and differential  -     Lipid panel    Hyperlipidemia, unspecified hyperlipidemia type  -      Lipid panel    Abnormal TSH  -     TSH, 3rd generation with Free T4 reflex    Cervical cancer screening  -     Ambulatory Referral to Obstetrics / Gynecology; Future    Seizure disorder (HCC)    Mild dysplasia of cervix (DAVINA I)    Abnormal mammogram    Vitamin D deficiency    Seasonal allergies    Elevated BP without diagnosis of hypertension          Subjective:      Patient ID: Deedee Robins is a 39 y.o. female.    Patient presents today to follow up on obesity, and hypothyroidism.     Hypothyroidism- Patient is asymptomatic, patient has been taking  Levothyroxine as prescribed, most recent TSH stable    Vitamin-D deficiency- patient has been taking vitamin-D supplementation    She continues on phentermine 15mg.  She denies any side effects with this medication.    -current weight 156lbs, starting weight 182 lbs  -current BMI 27.99, starting BMI 31.10    She does report changes in her diet and increased exercise    Noted to have elevated BP while in the office today     Anxiety- reports anxiety with he son starting contact football         The following portions of the patient's history were reviewed and updated as appropriate: allergies, current medications, past family history, past medical history, past social history, past surgical history, and problem list.    Review of Systems   Constitutional:  Negative for activity change, appetite change, chills, diaphoresis and fever.   HENT:  Negative for congestion, ear discharge, ear pain, postnasal drip, rhinorrhea, sinus pressure, sinus pain and sore throat.    Eyes:  Negative for pain, discharge, itching and visual disturbance.   Respiratory:  Negative for cough, chest tightness, shortness of breath and wheezing.    Cardiovascular:  Negative for chest pain, palpitations and leg swelling.   Gastrointestinal:  Negative for abdominal pain, constipation, diarrhea, nausea and vomiting.   Endocrine: Negative for polydipsia, polyphagia and polyuria.    Genitourinary:  Negative for difficulty urinating, dysuria and urgency.   Musculoskeletal:  Negative for arthralgias, back pain and neck pain.   Skin:  Negative for rash and wound.   Neurological:  Negative for dizziness, weakness, numbness and headaches.         Past Medical History:   Diagnosis Date    Allergic     Gastroenteritis     last assessed: 08/04/16    Headache(784.0) 5/5/22    Hypothyroidism 01/25/2018    Plantar fasciitis, right     last assessed: 04/16/15    PONV (postoperative nausea and vomiting)     Seizures (HCC)     last assessed: 08/15/17    Thyroid disease     last assessed: 08/15/17    Viral gastroenteritis     last assessed: 03/27/17         Current Outpatient Medications:     cetirizine (ZyrTEC) 10 mg tablet, Take 1 tablet (10 mg total) by mouth daily, Disp: 90 tablet, Rfl: 0    fluticasone (FLONASE) 50 mcg/act nasal spray, 2 sprays into each nostril daily, Disp: 18.2 mL, Rfl: 5    Fluticasone-Salmeterol (Advair) 100-50 mcg/dose inhaler, Inhale 1 puff 2 (two) times a day Rinse mouth after use., Disp: 60 blister, Rfl: 5    hydrOXYzine HCL (ATARAX) 25 mg tablet, Take 1 tablet (25 mg total) by mouth every 6 (six) hours as needed for anxiety, Disp: 30 tablet, Rfl: 0    levothyroxine 50 mcg tablet, Take 1 tablet (50 mcg total) by mouth daily, Disp: 90 tablet, Rfl: 1    montelukast (SINGULAIR) 10 mg tablet, Take 1 tablet (10 mg total) by mouth daily at bedtime, Disp: , Rfl:     phentermine 15 MG capsule, Take 2 capsules (30 mg total) by mouth every morning, Disp: 180 capsule, Rfl: 0    valACYclovir (VALTREX) 1,000 mg tablet, Take 2 tablets (2,000 mg total) by mouth 2 (two) times a day for 1 day (Patient taking differently: Take 2,000 mg by mouth if needed), Disp: 4 tablet, Rfl: 1    benzonatate (TESSALON PERLES) 100 mg capsule, Take 2 capsules (200 mg total) by mouth 3 (three) times a day as needed for cough, Disp: 60 capsule, Rfl: 0    ergocalciferol (VITAMIN D2) 50,000 units, Take 1 capsule  "(50,000 Units total) by mouth once a week (Patient not taking: Reported on 2024), Disp: 12 capsule, Rfl: 3    promethazine-dextromethorphan (PHENERGAN-DM) 6.25-15 mg/5 mL oral syrup, Take 2.5 mL by mouth 4 (four) times a day as needed for cough, Disp: 118 mL, Rfl: 0    Allergies   Allergen Reactions    Seasonal Ic [Cholestatin] Itching     Itchy eyes, congestion       Social History   Past Surgical History:   Procedure Laterality Date     SECTION      MOUTH SURGERY Bilateral     RI  DELIVERY ONLY N/A 2018    Procedure:  SECTION () REPEAT;  Surgeon: Tenisha Forrester DO;  Location: [facility];  Service: Obstetrics    RI EXCISION GANGLION WRIST DORSAL/VOLAR PRIMARY Left 3/15/2018    Procedure: VOLAR WRIST GANGLION CYST EXCISION;  Surgeon: Meet Sheldon MD;  Location: [facility];  Service: Orthopedics    TUBAL LIGATION Bilateral 2018    Procedure: LIGATION/COAGULATION TUBAL;  Surgeon: Tenisha Forrester DO;  Location: [facility];  Service: Obstetrics    US BREAST CLIP NEEDLE LOC RIGHT Right 2022    US GUIDED BREAST BIOPSY RIGHT COMPLETE Right 2022    WISDOM TOOTH EXTRACTION       Family History   Problem Relation Age of Onset    No Known Problems Mother     No Known Problems Father     Cancer Maternal Grandmother     Thyroid cancer Maternal Grandmother     Skin cancer Maternal Grandfather     Cancer Maternal Grandfather     Diabetes Paternal Grandmother     No Known Problems Paternal Grandfather        Objective:  /90 (BP Location: Left arm, Patient Position: Sitting, Cuff Size: Standard)   Pulse 94   Temp 98.2 °F (36.8 °C) (Temporal)   Ht 5' 2.72\" (1.593 m)   Wt 71 kg (156 lb 9.6 oz)   SpO2 99%   BMI 27.99 kg/m²              Physical Exam  Constitutional:       General: She is not in acute distress.     Appearance: She is well-developed. She is not diaphoretic.   HENT:      Head: Normocephalic and atraumatic.      Right Ear: External ear normal.      Left Ear: " External ear normal.      Nose: Nose normal.      Mouth/Throat:      Mouth: Mucous membranes are moist.      Pharynx: No oropharyngeal exudate or posterior oropharyngeal erythema.   Eyes:      General:         Right eye: No discharge.         Left eye: No discharge.      Conjunctiva/sclera: Conjunctivae normal.      Pupils: Pupils are equal, round, and reactive to light.   Neck:      Thyroid: No thyromegaly.   Cardiovascular:      Rate and Rhythm: Normal rate and regular rhythm.      Heart sounds: Normal heart sounds. No murmur heard.     No friction rub. No gallop.   Pulmonary:      Effort: Pulmonary effort is normal. No respiratory distress.      Breath sounds: Normal breath sounds. No stridor. No wheezing or rales.   Abdominal:      General: Bowel sounds are normal. There is no distension.      Palpations: Abdomen is soft.      Tenderness: There is no abdominal tenderness.   Musculoskeletal:      Cervical back: Normal range of motion and neck supple.   Lymphadenopathy:      Cervical: No cervical adenopathy.   Skin:     General: Skin is warm and dry.      Findings: No erythema or rash.   Neurological:      Mental Status: She is alert and oriented to person, place, and time.   Psychiatric:         Behavior: Behavior normal.         Thought Content: Thought content normal.         Judgment: Judgment normal.

## 2024-08-05 NOTE — ASSESSMENT & PLAN NOTE
Advised patient to follow-up with gyn, patient due for cervical cancer screening as well as advised patient to do monthly self-breast exams.

## 2024-08-05 NOTE — ASSESSMENT & PLAN NOTE
-Blood pressure elevated while in office today however patient reports that she is a little stressed out, will have her come in on Wednesday to repeat blood pressure if so may need to consider discontinuing phentermine.

## 2024-08-08 ENCOUNTER — TELEPHONE (OUTPATIENT)
Age: 39
End: 2024-08-08

## 2024-08-08 NOTE — TELEPHONE ENCOUNTER
PA for Phentermine 15 MG capsule  SUBMITTED     via    []CMM-KEY   [x]Surescripts-Case ID #: [id number]   []Faxed to plan   []Other website   []Phone call Case ID #     Office notes sent, clinical questions answered. Awaiting determination    Turnaround time for your insurance to make a decision on your Prior Authorization can take 7-21 business days.

## 2024-08-16 NOTE — TELEPHONE ENCOUNTER
PA for phentermine 15 MG capsule  Approved     Date(s) approved August 8, 2024 to November 8, 2024     Case #[id number]        Pharmacy advised by    [x]Fax  []Phone call    Approval letter scanned into [employer] Yes

## 2024-08-21 VITALS — DIASTOLIC BLOOD PRESSURE: 82 MMHG | SYSTOLIC BLOOD PRESSURE: 128 MMHG

## 2024-09-05 ENCOUNTER — HOSPITAL ENCOUNTER (OUTPATIENT)
Dept: RADIOLOGY | Facility: IMAGING CENTER | Age: 39
End: 2024-09-05
Payer: COMMERCIAL

## 2024-09-05 VITALS — HEIGHT: 63 IN | BODY MASS INDEX: 27.64 KG/M2 | WEIGHT: 156 LBS

## 2024-09-05 DIAGNOSIS — Z12.31 ENCOUNTER FOR SCREENING MAMMOGRAM FOR MALIGNANT NEOPLASM OF BREAST: ICD-10-CM

## 2024-09-05 PROCEDURE — 77067 SCR MAMMO BI INCL CAD: CPT

## 2024-09-05 PROCEDURE — 77063 BREAST TOMOSYNTHESIS BI: CPT

## 2024-09-06 ENCOUNTER — APPOINTMENT (OUTPATIENT)
Dept: LAB | Facility: IMAGING CENTER | Age: 39
End: 2024-09-06

## 2024-09-06 DIAGNOSIS — Z00.6 ENCOUNTER FOR EXAMINATION FOR NORMAL COMPARISON OR CONTROL IN CLINICAL RESEARCH PROGRAM: ICD-10-CM

## 2024-09-06 PROCEDURE — 36415 COLL VENOUS BLD VENIPUNCTURE: CPT

## 2024-09-10 DIAGNOSIS — R09.81 SINUS CONGESTION: Primary | ICD-10-CM

## 2024-09-10 RX ORDER — AZITHROMYCIN 250 MG/1
TABLET, FILM COATED ORAL
Qty: 6 TABLET | Refills: 0 | Status: SHIPPED | OUTPATIENT
Start: 2024-09-10 | End: 2024-09-15

## 2024-09-17 LAB
APOB+LDLR+PCSK9 GENE MUT ANL BLD/T: NOT DETECTED
BRCA1+BRCA2 DEL+DUP + FULL MUT ANL BLD/T: NOT DETECTED
MLH1+MSH2+MSH6+PMS2 GN DEL+DUP+FUL M: NOT DETECTED

## 2024-10-02 ENCOUNTER — CLINICAL SUPPORT (OUTPATIENT)
Dept: INTERNAL MEDICINE CLINIC | Facility: OTHER | Age: 39
End: 2024-10-02
Payer: COMMERCIAL

## 2024-10-02 ENCOUNTER — OFFICE VISIT (OUTPATIENT)
Dept: OBGYN CLINIC | Facility: CLINIC | Age: 39
End: 2024-10-02
Payer: COMMERCIAL

## 2024-10-02 VITALS
DIASTOLIC BLOOD PRESSURE: 70 MMHG | BODY MASS INDEX: 27.04 KG/M2 | SYSTOLIC BLOOD PRESSURE: 112 MMHG | WEIGHT: 152.6 LBS | HEIGHT: 63 IN

## 2024-10-02 DIAGNOSIS — Z12.31 ENCOUNTER FOR SCREENING MAMMOGRAM FOR MALIGNANT NEOPLASM OF BREAST: ICD-10-CM

## 2024-10-02 DIAGNOSIS — Z12.4 CERVICAL CANCER SCREENING: ICD-10-CM

## 2024-10-02 DIAGNOSIS — Z11.51 SCREENING FOR HUMAN PAPILLOMAVIRUS (HPV): ICD-10-CM

## 2024-10-02 DIAGNOSIS — Z01.419 ENCNTR FOR GYN EXAM (GENERAL) (ROUTINE) W/O ABN FINDINGS: Primary | ICD-10-CM

## 2024-10-02 DIAGNOSIS — R09.81 SINUS CONGESTION: Primary | ICD-10-CM

## 2024-10-02 LAB
SARS-COV-2 AG UPPER RESP QL IA: NEGATIVE
VALID CONTROL: NORMAL

## 2024-10-02 PROCEDURE — G0145 SCR C/V CYTO,THINLAYER,RESCR: HCPCS | Performed by: OBSTETRICS & GYNECOLOGY

## 2024-10-02 PROCEDURE — G0476 HPV COMBO ASSAY CA SCREEN: HCPCS | Performed by: OBSTETRICS & GYNECOLOGY

## 2024-10-02 PROCEDURE — 87811 SARS-COV-2 COVID19 W/OPTIC: CPT

## 2024-10-02 PROCEDURE — S0610 ANNUAL GYNECOLOGICAL EXAMINA: HCPCS | Performed by: OBSTETRICS & GYNECOLOGY

## 2024-10-02 NOTE — PROGRESS NOTES
Deedee Voss Shimon  1985      CC:  Yearly exam    S:  39 y.o. female here for yearly exam. She was previously a patient at [facility] and has not seen someone since the birth of her second child.  She works at the  of one of the [email or web address].     Her cycles are regular, not heavy or crampy.  She does have some spotting at the end which is not worsening and is not enough of a nuisance at present for her to want to take anything.    Sexual activity: She is sexually active without pain, bleeding or dryness. She does note decreased libido.  Discussed complexity of female libido and some ideas for how to help.     Contraception: She uses her tubal for contraception.     Last Pap 9/12/2017 - normal/negative HPV  Last Mammo 9/5/2024 - left BIRAD-1, right BIRAD-2    We reviewed ASCCP guidelines for Pap testing today.       Current Outpatient Medications:     cetirizine (ZyrTEC) 10 mg tablet, Take 1 tablet (10 mg total) by mouth daily, Disp: 90 tablet, Rfl: 0    fluticasone (FLONASE) 50 mcg/act nasal spray, 2 sprays into each nostril daily, Disp: 18.2 mL, Rfl: 5    Fluticasone-Salmeterol (Advair) 100-50 mcg/dose inhaler, Inhale 1 puff 2 (two) times a day Rinse mouth after use., Disp: 60 blister, Rfl: 5    levothyroxine 50 mcg tablet, Take 1 tablet (50 mcg total) by mouth daily, Disp: 90 tablet, Rfl: 1    montelukast (SINGULAIR) 10 mg tablet, Take 1 tablet (10 mg total) by mouth daily at bedtime, Disp: , Rfl:     phentermine 15 MG capsule, Take 2 capsules (30 mg total) by mouth every morning, Disp: 180 capsule, Rfl: 0    valACYclovir (VALTREX) 1,000 mg tablet, Take 2 tablets (2,000 mg total) by mouth 2 (two) times a day for 1 day (Patient taking differently: Take 2,000 mg by mouth if needed), Disp: 4 tablet, Rfl: 1    hydrOXYzine HCL (ATARAX) 25 mg tablet, Take 1 tablet (25 mg total) by mouth every 6 (six) hours as needed for anxiety (Patient not taking: Reported on 10/2/2024), Disp: 30 tablet, Rfl:  0  Social History     Socioeconomic History    Marital status: /Civil Union     Spouse name: Not on file    Number of children: Not on file    Years of education: Not on file    Highest education level: Not on file   Occupational History    Not on file   Tobacco Use    Smoking status: Never    Smokeless tobacco: Never   Vaping Use    Vaping status: Never Used   Substance and Sexual Activity    Alcohol use: Yes     Alcohol/week: 1.0 standard drink of alcohol     Types: 1 Cans of beer per week     Comment: one per month    Drug use: No    Sexual activity: Yes     Partners: Male     Birth control/protection: Other     Comment: Tubal ligation 12/17/2018   Other Topics Concern    Not on file   Social History Narrative    Caffeine use    Uses safety equipment - seatbelts     Social Determinants of Health     Financial Resource Strain: Not on file   Food Insecurity: Not on file   Transportation Needs: Not on file   Physical Activity: Not on file   Stress: Not on file   Social Connections: Unknown (6/18/2024)    Received from [employer] [employer]    Social [place]     How often do you feel lonely or isolated from those around you? (Adult - for ages 18 years and over): Not on file   Intimate Partner Violence: Not on file   Housing Stability: Not on file     Family History   Problem Relation Age of Onset    No Known Problems Mother     No Known Problems Father     Cancer Maternal Grandmother     Thyroid cancer Maternal Grandmother     Skin cancer Maternal Grandfather     Cancer Maternal Grandfather     Diabetes Paternal Grandmother     No Known Problems Paternal Grandfather     No Known Problems Son     No Known Problems Son     No Known Problems Maternal Aunt     No Known Problems Paternal Aunt     No Known Problems Brother     No Known Problems Brother       Past Medical History:   Diagnosis Date    Abnormal Pap smear of cervix 9/3/2009    Allergic     Gastroenteritis     last assessed: 08/04/16    Gestational diabetes 2018  "   Headache(784.0) 5/5/22    Hypothyroidism 01/25/2018    Plantar fasciitis, right     last assessed: 04/16/15    PONV (postoperative nausea and vomiting)     Seizures (HCC)     last assessed: 08/15/17    Thyroid disease     last assessed: 08/15/17    Viral gastroenteritis     last assessed: 03/27/17        Review of Systems   Respiratory: Negative.    Cardiovascular: Negative.    Gastrointestinal: Negative for constipation and diarrhea.   Genitourinary: Negative for difficulty urinating, pelvic pain, vaginal bleeding, vaginal discharge, itching or odor.    O:  Blood pressure 112/70, height 5' 2.5\" (1.588 m), weight 69.2 kg (152 lb 9.6 oz), last menstrual period 09/18/2024, not currently breastfeeding.    Patient appears well and is not in distress  Neck is supple without masses  Breasts are symmetrical without mass, tenderness, nipple discharge, skin changes or adenopathy.   Abdomen is soft and nontender without masses.   External genitals are normal without lesions or rashes.  Urethral meatus and urethra are normal  Bladder is normal to palpation  Vagina is normal without discharge or bleeding.   Cervix is normal without discharge or lesion.   Uterus is normal, mobile, nontender without palpable mass.  Adnexa are normal, nontender, without palpable mass.     A:   Yearly exam.     P:   Pap with HPV done - will message patient with results   Mammo slip provided     RTO one year for yearly exam or sooner as needed.     "

## 2024-10-03 ENCOUNTER — OFFICE VISIT (OUTPATIENT)
Dept: INTERNAL MEDICINE CLINIC | Facility: OTHER | Age: 39
End: 2024-10-03
Payer: COMMERCIAL

## 2024-10-03 VITALS
DIASTOLIC BLOOD PRESSURE: 92 MMHG | WEIGHT: 150.2 LBS | OXYGEN SATURATION: 98 % | HEIGHT: 63 IN | TEMPERATURE: 98.2 F | HEART RATE: 65 BPM | BODY MASS INDEX: 26.61 KG/M2 | SYSTOLIC BLOOD PRESSURE: 118 MMHG

## 2024-10-03 DIAGNOSIS — R53.81 MALAISE: Primary | ICD-10-CM

## 2024-10-03 LAB
HPV HR 12 DNA CVX QL NAA+PROBE: NEGATIVE
HPV16 DNA CVX QL NAA+PROBE: NEGATIVE
HPV18 DNA CVX QL NAA+PROBE: NEGATIVE
SARS-COV-2 AG UPPER RESP QL IA: NEGATIVE
SL AMB POCT RAPID FLU A: NORMAL
SL AMB POCT RAPID FLU B: NORMAL
VALID CONTROL: NORMAL

## 2024-10-03 PROCEDURE — 87804 INFLUENZA ASSAY W/OPTIC: CPT | Performed by: INTERNAL MEDICINE

## 2024-10-03 PROCEDURE — 87811 SARS-COV-2 COVID19 W/OPTIC: CPT | Performed by: INTERNAL MEDICINE

## 2024-10-03 PROCEDURE — 99213 OFFICE O/P EST LOW 20 MIN: CPT | Performed by: INTERNAL MEDICINE

## 2024-10-03 NOTE — PATIENT INSTRUCTIONS
At this time I recommend supportive care. Continue over the counter medications as needed, Tylenol and Ibuprofen, as well as rest and good oral hydration.  If you do not experience improvement in 7-10 days seek follow up appointment.  You have a follow up scheduled in 5 months.

## 2024-10-03 NOTE — LETTER
October 3, 2024     Patient: Deedee Robins  YOB: 1985  Date of Visit: 10/3/2024      To Whom it May Concern:    Deedee Robins is under my professional care. Deedee was seen in my office on 10/3/2024. Deedee may return to work on 10/7/2024 .    If you have any questions or concerns, please don't hesitate to call.         Sincerely,          Dick Rainey MD        CC: No Recipients

## 2024-10-03 NOTE — PROGRESS NOTES
[facility]  INTERNAL MEDICINE OFFICE VISIT     PATIENT INFORMATION     Deedee Robins   39 y.o. female   MRN: [id number]    ASSESSMENT/PLAN     Diagnoses and all orders for this visit:    Malaise      Malaise  -Patient presenting with a 1 day history of body aches, chills, fever and headache as well as fatigue.   -Patient works in a medical office.  -Covid test yesterday was negative.  -Currently taking tylenol, ibuprofen for symptoms.  -Flu test negative in the office.  -Repeat covid test negative in the office.    Plan:  -At this time, recommend supportive care for likely viral cold.  -Rest and good oral hydration.  -Instructed patient that if she does not experience improvement in 7-10 days to seek follow up appointment.  -Continue OTC meds as needed.  -Patient has follow up scheduled in 5 months.    Schedule a follow-up appointment in 5 months.    HEALTH MAINTENANCE     Immunization History   Administered Date(s) Administered    COVID-19 PFIZER VACCINE 0.3 ML IM 01/17/2021, 02/06/2021    HPV Quadrivalent 09/03/2009    INFLUENZA 12/01/2015, 12/01/2015, 11/01/2016, 11/09/2018, 10/28/2020, 11/29/2021    Influenza Quadrivalent Preservative Free 3 years and older IM 11/09/2018    Influenza Quadrivalent, 6-35 Months IM 12/18/2017    Influenza, seasonal, injectable 11/01/2016    Tdap 11/12/2015, 10/15/2018, 10/15/2018    influenza, injectable, quadrivalent 11/29/2021     CHIEF COMPLAINT     Chief Complaint   Patient presents with    Cold Like Symptoms     Covid swab in office yesterday was neg. Symptoms started yesterday morning, fatigue, body aches, chills, fever, headache.       HISTORY OF PRESENT ILLNESS     Patient is a 40 y/o female with PMH including GERD, hypothyroidism, who presents today with a 1 day history of body aches, chills, fever and headache as well as fatigue. 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 She has not had similar symptoms recently. She tested negative for COVID  "yesterday. No respiratory symptoms at this time. She describes her headache as sharp, behind the eyes. She has taken Tylenol and Ibuprofen for her symptoms.     REVIEW OF SYSTEMS     Review of Systems   Constitutional:  Positive for fatigue and fever. Negative for chills.   HENT:  Negative for ear pain and sore throat.    Eyes:  Negative for pain and visual disturbance.   Respiratory:  Negative for cough and shortness of breath.    Cardiovascular:  Negative for chest pain and palpitations.   Gastrointestinal:  Negative for abdominal pain and vomiting.   Endocrine: Negative for polyuria.   Genitourinary:  Negative for dysuria and hematuria.   Musculoskeletal:  Positive for myalgias. Negative for arthralgias and back pain.   Skin:  Negative for color change and rash.   Neurological:  Positive for headaches. Negative for seizures and syncope.   Psychiatric/Behavioral:  The patient is not nervous/anxious.    All other systems reviewed and are negative.    OBJECTIVE     Vitals:    10/03/24 1017   BP: 118/92   BP Location: Left arm   Patient Position: Sitting   Cuff Size: Adult   Pulse: 65   Temp: 98.2 °F (36.8 °C)   TempSrc: Temporal   SpO2: 98%   Weight: 68.1 kg (150 lb 3.2 oz)   Height: 5' 2.5\" (1.588 m)     Physical Exam  Vitals and nursing note reviewed.   Constitutional:       General: She is not in acute distress.     Appearance: She is well-developed.   HENT:      Head: Normocephalic and atraumatic.      Right Ear: External ear normal.      Left Ear: External ear normal.      Nose: Nose normal.      Mouth/Throat:      Mouth: Mucous membranes are moist.   Eyes:      Conjunctiva/sclera: Conjunctivae normal.   Cardiovascular:      Rate and Rhythm: Normal rate and regular rhythm.      Heart sounds: No murmur heard.  Pulmonary:      Effort: Pulmonary effort is normal. No respiratory distress.      Breath sounds: Normal breath sounds.   Abdominal:      Palpations: Abdomen is soft.      Tenderness: There is no abdominal " tenderness.   Musculoskeletal:         General: No swelling.      Cervical back: Neck supple.   Skin:     General: Skin is warm and dry.      Capillary Refill: Capillary refill takes less than 2 seconds.   Neurological:      General: No focal deficit present.      Mental Status: She is alert and oriented to person, place, and time.   Psychiatric:         Mood and Affect: Mood normal.       CURRENT MEDICATIONS     Current Outpatient Medications:     cetirizine (ZyrTEC) 10 mg tablet, Take 1 tablet (10 mg total) by mouth daily (Patient taking differently: Take 10 mg by mouth daily as needed), Disp: 90 tablet, Rfl: 0    fluticasone (FLONASE) 50 mcg/act nasal spray, 2 sprays into each nostril daily (Patient taking differently: 2 sprays into each nostril daily as needed), Disp: 18.2 mL, Rfl: 5    Fluticasone-Salmeterol (Advair) 100-50 mcg/dose inhaler, Inhale 1 puff 2 (two) times a day Rinse mouth after use. (Patient taking differently: Inhale 1 puff 2 (two) times a day as needed Rinse mouth after use.), Disp: 60 blister, Rfl: 5    hydrOXYzine HCL (ATARAX) 25 mg tablet, Take 1 tablet (25 mg total) by mouth every 6 (six) hours as needed for anxiety, Disp: 30 tablet, Rfl: 0    levothyroxine 50 mcg tablet, Take 1 tablet (50 mcg total) by mouth daily, Disp: 90 tablet, Rfl: 1    montelukast (SINGULAIR) 10 mg tablet, Take 1 tablet (10 mg total) by mouth daily at bedtime (Patient taking differently: Take 10 mg by mouth daily at bedtime as needed), Disp: , Rfl:     phentermine 15 MG capsule, Take 2 capsules (30 mg total) by mouth every morning, Disp: 180 capsule, Rfl: 0    valACYclovir (VALTREX) 1,000 mg tablet, Take 2 tablets (2,000 mg total) by mouth 2 (two) times a day for 1 day (Patient taking differently: Take 2,000 mg by mouth if needed), Disp: 4 tablet, Rfl: 1    PAST MEDICAL & SURGICAL HISTORY     Past Medical History:   Diagnosis Date    Abnormal Pap smear of cervix 9/3/2009    Allergic     Gastroenteritis     last  assessed: 16    Gestational diabetes 2018    Headache(784.0) 22    Hypothyroidism 2018    Plantar fasciitis, right     last assessed: 04/16/15    PONV (postoperative nausea and vomiting)     Seizures (HCC)     last assessed: 08/15/17    Thyroid disease     last assessed: 08/15/17    Viral gastroenteritis     last assessed: 17     Past Surgical History:   Procedure Laterality Date    BREAST BIOPSY Right 2022     SECTION      MOUTH SURGERY Bilateral 2000    UT  DELIVERY ONLY N/A 2018    Procedure:  SECTION () REPEAT;  Surgeon: Tenisha Forrester DO;  Location: [facility];  Service: Obstetrics    UT EXCISION GANGLION WRIST DORSAL/VOLAR PRIMARY Left 03/15/2018    Procedure: VOLAR WRIST GANGLION CYST EXCISION;  Surgeon: Meet Sheldon MD;  Location: [facility];  Service: Orthopedics    TUBAL LIGATION Bilateral 2018    Procedure: LIGATION/COAGULATION TUBAL;  Surgeon: Tenisha Forrester DO;  Location: [facility];  Service: Obstetrics    US BREAST CLIP NEEDLE LOC RIGHT Right 2022    US GUIDED BREAST BIOPSY RIGHT COMPLETE Right 2022    WISDOM TOOTH EXTRACTION       SOCIAL & FAMILY HISTORY     Social History     Socioeconomic History    Marital status: /Civil Union     Spouse name: Not on file    Number of children: Not on file    Years of education: Not on file    Highest education level: Not on file   Occupational History    Not on file   Tobacco Use    Smoking status: Never    Smokeless tobacco: Never   Vaping Use    Vaping status: Never Used   Substance and Sexual Activity    Alcohol use: Yes     Alcohol/week: 1.0 standard drink of alcohol     Types: 1 Cans of beer per week     Comment: one per month    Drug use: No    Sexual activity: Yes     Partners: Male     Birth control/protection: Other     Comment: Tubal ligation 2018   Other Topics Concern    Not on file   Social History Narrative    Caffeine use    Uses safety equipment - seatbelts      Social Determinants of Health     Financial Resource Strain: Not on file   Food Insecurity: Not on file   Transportation Needs: Not on file   Physical Activity: Not on file   Stress: Not on file   Social Connections: Unknown (6/18/2024)    Received from [facility]     How often do you feel lonely or isolated from those around you? (Adult - for ages 18 years and over): Not on file   Intimate Partner Violence: Not on file   Housing Stability: Not on file     Social History     Substance and Sexual Activity   Alcohol Use Yes    Alcohol/week: 1.0 standard drink of alcohol    Types: 1 Cans of beer per week    Comment: one per month       Social History     Substance and Sexual Activity   Drug Use No     Social History     Tobacco Use   Smoking Status Never   Smokeless Tobacco Never     Family History   Problem Relation Age of Onset    No Known Problems Mother     No Known Problems Father     Cancer Maternal Grandmother     Thyroid cancer Maternal Grandmother     Skin cancer Maternal Grandfather     Cancer Maternal Grandfather     Diabetes Paternal Grandmother     No Known Problems Paternal Grandfather     No Known Problems Son     No Known Problems Son     No Known Problems Maternal Aunt     No Known Problems Paternal Aunt     No Known Problems Brother     No Known Problems Brother              ==  Dick Rainey MD PGY3  [facility] Medicine Res[facility]

## 2024-10-07 LAB
LAB AP GYN PRIMARY INTERPRETATION: NORMAL
LAB AP LMP: NORMAL
Lab: NORMAL

## 2024-12-06 RX ORDER — PHENTERMINE HYDROCHLORIDE 15 MG/1
30 CAPSULE ORAL EVERY MORNING
Qty: 180 CAPSULE | Refills: 0 | Status: SHIPPED | OUTPATIENT
Start: 2024-12-06

## 2025-01-06 ENCOUNTER — OFFICE VISIT (OUTPATIENT)
Dept: INTERNAL MEDICINE CLINIC | Facility: OTHER | Age: 40
End: 2025-01-06
Payer: COMMERCIAL

## 2025-01-06 VITALS
RESPIRATION RATE: 16 BRPM | BODY MASS INDEX: 26.4 KG/M2 | HEART RATE: 98 BPM | SYSTOLIC BLOOD PRESSURE: 122 MMHG | TEMPERATURE: 98.7 F | OXYGEN SATURATION: 99 % | DIASTOLIC BLOOD PRESSURE: 80 MMHG | HEIGHT: 63 IN | WEIGHT: 149 LBS

## 2025-01-06 DIAGNOSIS — J01.80 ACUTE NON-RECURRENT SINUSITIS OF OTHER SINUS: Primary | ICD-10-CM

## 2025-01-06 PROBLEM — U07.1 COVID-19: Status: RESOLVED | Noted: 2020-12-08 | Resolved: 2025-01-06

## 2025-01-06 PROBLEM — J01.90 ACUTE INFECTION OF NASAL SINUS: Status: ACTIVE | Noted: 2021-05-03

## 2025-01-06 PROCEDURE — 99213 OFFICE O/P EST LOW 20 MIN: CPT | Performed by: INTERNAL MEDICINE

## 2025-01-06 RX ORDER — FLUTICASONE PROPIONATE 50 MCG
2 SPRAY, SUSPENSION (ML) NASAL DAILY PRN
Qty: 18 ML | Refills: 3 | Status: SHIPPED | OUTPATIENT
Start: 2025-01-06

## 2025-01-06 NOTE — ASSESSMENT & PLAN NOTE
Will prescribe Augmentin twice a day for 1 week.  Recommend she start Flonase and continue over-the-counter decongestant.  She is to contact our office worsening symptoms.  Orders:  •  amoxicillin-clavulanate (AUGMENTIN) 875-125 mg per tablet; Take 1 tablet by mouth every 12 (twelve) hours for 7 days  •  fluticasone (FLONASE) 50 mcg/act nasal spray; 2 sprays into each nostril daily as needed for allergies or rhinitis

## 2025-01-06 NOTE — PROGRESS NOTES
Name: Deedee Robins      : 1985      MRN: [id number]  Encounter Provider: Russ Shane MD  Encounter Date: 2025   Encounter department: [facility]  :  Assessment & Plan  Acute non-recurrent sinusitis of other sinus  Will prescribe Augmentin twice a day for 1 week.  Recommend she start Flonase and continue over-the-counter decongestant.  She is to contact our office worsening symptoms.  Orders:  •  amoxicillin-clavulanate (AUGMENTIN) 875-125 mg per tablet; Take 1 tablet by mouth every 12 (twelve) hours for 7 days  •  fluticasone (FLONASE) 50 mcg/act nasal spray; 2 sprays into each nostril daily as needed for allergies or rhinitis           History of Present Illness     Deedee Robins is seen today with concern for acute sinusitis.  She had COVID towards the end of December, symptoms worsened to which she is now experiencing sinus pressure and congestion with a productive cough.    Sinusitis  This is a new problem. The current episode started 1 to 4 weeks ago. There has been no fever. Associated symptoms include congestion, coughing and sinus pressure. Pertinent negatives include no chills, diaphoresis, headaches, shortness of breath, sneezing or sore throat. Past treatments include oral decongestants. The treatment provided mild relief.     Review of Systems   Constitutional:  Negative for activity change, appetite change, chills, diaphoresis, fatigue and fever.   HENT:  Positive for congestion, postnasal drip and sinus pressure. Negative for rhinorrhea, sinus pain, sneezing and sore throat.    Eyes:  Negative for visual disturbance.   Respiratory:  Positive for cough. Negative for apnea, choking, chest tightness, shortness of breath and wheezing.    Cardiovascular:  Negative for chest pain, palpitations and leg swelling.   Gastrointestinal:  Negative for abdominal distention, abdominal pain, anal bleeding, blood in stool, constipation, diarrhea,  "nausea and vomiting.   Endocrine: Negative for cold intolerance and heat intolerance.   Genitourinary:  Negative for difficulty urinating, dysuria and hematuria.   Musculoskeletal: Negative.    Skin: Negative.    Neurological:  Negative for dizziness, weakness, light-headedness, numbness and headaches.   Hematological:  Negative for adenopathy.   Psychiatric/Behavioral:  Negative for agitation, sleep disturbance and suicidal ideas.    All other systems reviewed and are negative.      Objective   /80 (BP Location: Left arm, Patient Position: Sitting, Cuff Size: Standard)   Pulse 98   Temp 98.7 °F (37.1 °C) (Temporal)   Resp 16   Ht 5' 2.75\" (1.594 m)   Wt 67.6 kg (149 lb)   SpO2 99%   BMI 26.60 kg/m²      Physical Exam  Vitals and nursing note reviewed.   Constitutional:       General: She is not in acute distress.     Appearance: She is well-developed. She is not diaphoretic.   HENT:      Head: Normocephalic and atraumatic.      Right Ear: A middle ear effusion is present.      Left Ear: A middle ear effusion is present.   Eyes:      General:         Right eye: No discharge.         Left eye: No discharge.      Conjunctiva/sclera: Conjunctivae normal.      Pupils: Pupils are equal, round, and reactive to light.   Neck:      Thyroid: No thyromegaly.      Vascular: No JVD.   Cardiovascular:      Rate and Rhythm: Normal rate and regular rhythm.      Heart sounds: Normal heart sounds. No murmur heard.     No friction rub. No gallop.   Pulmonary:      Effort: Pulmonary effort is normal. No respiratory distress.      Breath sounds: Normal breath sounds. No wheezing or rales.   Chest:      Chest wall: No tenderness.   Abdominal:      General: There is no distension.      Palpations: Abdomen is soft.      Tenderness: There is no abdominal tenderness.   Musculoskeletal:         General: No tenderness or deformity. Normal range of motion.      Cervical back: Normal range of motion and neck supple. "   Lymphadenopathy:      Cervical: No cervical adenopathy.   Skin:     General: Skin is warm and dry.      Coloration: Skin is not pale.      Findings: No erythema or rash.   Neurological:      Mental Status: She is alert and oriented to person, place, and time.      Cranial Nerves: No cranial nerve deficit.      Coordination: Coordination normal.   Psychiatric:         Behavior: Behavior normal.         Thought Content: Thought content normal.         Judgment: Judgment normal.

## 2025-01-22 DIAGNOSIS — E03.9 HYPOTHYROIDISM, UNSPECIFIED TYPE: ICD-10-CM

## 2025-01-22 RX ORDER — LEVOTHYROXINE SODIUM 50 UG/1
50 TABLET ORAL DAILY
Qty: 90 TABLET | Refills: 1 | Status: SHIPPED | OUTPATIENT
Start: 2025-01-22

## 2025-02-05 PROBLEM — J01.90 ACUTE INFECTION OF NASAL SINUS: Status: RESOLVED | Noted: 2021-05-03 | Resolved: 2025-02-05

## 2025-02-12 ENCOUNTER — OFFICE VISIT (OUTPATIENT)
Dept: INTERNAL MEDICINE CLINIC | Facility: OTHER | Age: 40
End: 2025-02-12
Payer: COMMERCIAL

## 2025-02-12 VITALS
WEIGHT: 147 LBS | DIASTOLIC BLOOD PRESSURE: 78 MMHG | SYSTOLIC BLOOD PRESSURE: 124 MMHG | OXYGEN SATURATION: 99 % | TEMPERATURE: 98 F | HEIGHT: 62 IN | HEART RATE: 75 BPM | BODY MASS INDEX: 27.05 KG/M2

## 2025-02-12 DIAGNOSIS — Z13.228 SCREENING FOR METABOLIC DISORDER: Primary | ICD-10-CM

## 2025-02-12 DIAGNOSIS — J30.2 SEASONAL ALLERGIES: ICD-10-CM

## 2025-02-12 DIAGNOSIS — E03.9 HYPOTHYROIDISM, UNSPECIFIED TYPE: ICD-10-CM

## 2025-02-12 DIAGNOSIS — F41.9 ANXIETY: ICD-10-CM

## 2025-02-12 DIAGNOSIS — R73.01 ELEVATED FASTING GLUCOSE: ICD-10-CM

## 2025-02-12 DIAGNOSIS — E55.9 VITAMIN D DEFICIENCY: ICD-10-CM

## 2025-02-12 DIAGNOSIS — R03.0 ELEVATED BP WITHOUT DIAGNOSIS OF HYPERTENSION: ICD-10-CM

## 2025-02-12 DIAGNOSIS — R79.89 ABNORMAL TSH: ICD-10-CM

## 2025-02-12 DIAGNOSIS — G40.909 SEIZURE DISORDER (HCC): ICD-10-CM

## 2025-02-12 DIAGNOSIS — Z00.00 ANNUAL PHYSICAL EXAM: ICD-10-CM

## 2025-02-12 DIAGNOSIS — E78.5 HYPERLIPIDEMIA, UNSPECIFIED HYPERLIPIDEMIA TYPE: ICD-10-CM

## 2025-02-12 DIAGNOSIS — N87.0 MILD DYSPLASIA OF CERVIX (CIN I): ICD-10-CM

## 2025-02-12 PROBLEM — Z79.899 MEDICATION MANAGEMENT: Status: RESOLVED | Noted: 2020-09-28 | Resolved: 2025-02-12

## 2025-02-12 PROBLEM — R11.0 NAUSEA: Status: RESOLVED | Noted: 2018-05-04 | Resolved: 2025-02-12

## 2025-02-12 PROBLEM — Z98.891 S/P REPEAT LOW TRANSVERSE C-SECTION: Status: RESOLVED | Noted: 2018-12-17 | Resolved: 2025-02-12

## 2025-02-12 PROCEDURE — 99214 OFFICE O/P EST MOD 30 MIN: CPT | Performed by: NURSE PRACTITIONER

## 2025-02-12 PROCEDURE — 99396 PREV VISIT EST AGE 40-64: CPT | Performed by: NURSE PRACTITIONER

## 2025-02-12 NOTE — ASSESSMENT & PLAN NOTE
-Continue Zyrtec, Flonase and Singulair  -Consider allergy testing and referral to allergist

## 2025-02-12 NOTE — ASSESSMENT & PLAN NOTE
Continue to monitor blood pressure at home.  Goal BP is < 130/80.  Contact our office for consistent elevations.  Recommend low sodium diet.  Exercise 30 minutes 5 times a week as tolerated.  Recommend yearly eye exam.

## 2025-02-12 NOTE — ASSESSMENT & PLAN NOTE
-due for fasting blood work   -Continue with well-balanced diet, encouraged daily exercise  -She is up-to-date with cervical and breast cancer screening  -She is up-to-date with vaccinations  -Encourage monthly self breast examination  -Follow-up in 1 year for annual physical or sooner if needed

## 2025-02-12 NOTE — PROGRESS NOTES
Adult Annual Physical  Name: Deedee Robins      : 1985      MRN: [id number]  Encounter Provider: HARSHA Hardin  Encounter Date: 2025   Encounter department: [facility]    Assessment & Plan  Screening for metabolic disorder    Orders:  •  Comprehensive metabolic panel  •  CBC and differential  •  Lipid panel    Hyperlipidemia, unspecified hyperlipidemia type    Orders:  •  Lipid panel    Elevated fasting glucose    Orders:  •  Hemoglobin A1C    Abnormal TSH    Orders:  •  TSH, 3rd generation with Free T4 reflex    Hypothyroidism, unspecified type  Will get updated TSH  Continue levothyroxine 50 mcg tablet daily     Orders:  •  TSH, 3rd generation with Free T4 reflex    Seizure disorder (HCC)  Patient's most recent seizure , patient currently not on seizure medication, has seen neurology in the past.         Mild dysplasia of cervix (DAVINA I)  -Up-to-date with cervical cancer screening, continue to follow up with GYN          Anxiety  -continue PRN Atarax          Vitamin D deficiency  -Continue with supplementation         Seasonal allergies  -Continue Zyrtec, Flonase and Singulair  -Consider allergy testing and referral to allergist         Elevated BP without diagnosis of hypertension  Continue to monitor blood pressure at home.  Goal BP is < 130/80.  Contact our office for consistent elevations.  Recommend low sodium diet.  Exercise 30 minutes 5 times a week as tolerated.  Recommend yearly eye exam.           BMI 31.0-31.9,adult  -continue phentermine  -continue lifestyle modification         Annual physical exam  -due for fasting blood work   -Continue with well-balanced diet, encouraged daily exercise  -She is up-to-date with cervical and breast cancer screening  -She is up-to-date with vaccinations  -Encourage monthly self breast examination  -Follow-up in 1 year for annual physical or sooner if needed           Immunizations and  preventive care screenings were discussed with patient today. Appropriate education was printed on patient's after visit summary.    Counseling:  Alcohol/drug use: discussed moderation in alcohol intake, the recommendations for healthy alcohol use, and avoidance of illicit drug use.  Dental Health: discussed importance of regular tooth brushing, flossing, and dental visits.  Injury prevention: discussed safety/seat belts, safety helmets, smoke detectors, carbon monoxide detectors, and smoking near bedding or upholstery.  Sexual health: discussed sexually transmitted diseases, partner selection, use of condoms, avoidance of unintended pregnancy, and contraceptive alternatives.  Exercise: the importance of regular exercise/physical activity was discussed. Recommend exercise 3-5 times per week for at least 30 minutes.        BMI Counseling: Body mass index is 26.89 kg/m². The BMI is above normal. Nutrition recommendations include reducing portion sizes, decreasing overall calorie intake, 3-5 servings of fruits/vegetables daily, and reducing fast food intake. Exercise recommendations include moderate aerobic physical activity for 150 minutes/week.    History of Present Illness     Adult Annual Physical:  Patient presents for annual physical. Patient presents today to follow up on chronic conditions and for annual physical.  She denies any new concerns.     Hypothyroidism- Patient is asymptomatic, patient has been taking  Levothyroxine as prescribed, most recent TSH stable     Vitamin-D deficiency- patient has been taking vitamin-D supplementation     She continues on phentermine intermittently     -current weight 147lbs, starting weight 182 lbs  -current BMI 26.89, starting BMI 31.10     She does report changes in her diet and increased exercise  .     Diet and Physical Activity:  - Diet/Nutrition: well balanced diet.  - Exercise: 3-4 times a week on average.    Depression Screening:  - PHQ-2 Score: 0    General  "Health:  - Sleep: sleeps poorly and 4-6 hours of sleep on average.  - Hearing: normal hearing bilateral ears.  - Vision: no vision problems, wears contacts and goes for regular eye exams.  - Dental: regular dental visits and brushes teeth twice daily.    /GYN Health:  - Follows with GYN: yes.   - Last menstrual cycle: 2/3/2025.   - History of STDs: no  - Contraception:. tubal ligaton      Advanced Care Planning:  - Has an advanced directive?: no    - Has a durable medical POA?: no    - ACP document given to patient?: no      Review of Systems   Constitutional:  Negative for activity change, appetite change, chills, diaphoresis and fever.   HENT:  Negative for congestion, ear discharge, ear pain, postnasal drip, rhinorrhea, sinus pressure, sinus pain and sore throat.    Eyes:  Negative for pain, discharge, itching and visual disturbance.   Respiratory:  Negative for cough, chest tightness, shortness of breath and wheezing.    Cardiovascular:  Negative for chest pain, palpitations and leg swelling.   Gastrointestinal:  Negative for abdominal pain, constipation, diarrhea, nausea and vomiting.   Endocrine: Negative for polydipsia, polyphagia and polyuria.   Genitourinary:  Negative for difficulty urinating, dysuria and urgency.   Musculoskeletal:  Negative for arthralgias, back pain and neck pain.   Skin:  Negative for rash and wound.   Neurological:  Negative for dizziness, weakness, numbness and headaches.         Objective   /78 (BP Location: Left arm, Patient Position: Sitting, Cuff Size: Adult)   Pulse 75   Temp 98 °F (36.7 °C)   Ht 5' 2\" (1.575 m)   Wt 66.7 kg (147 lb)   SpO2 99%   BMI 26.89 kg/m²     Physical Exam  Constitutional:       General: She is not in acute distress.     Appearance: She is well-developed. She is not diaphoretic.   HENT:      Head: Normocephalic and atraumatic.      Right Ear: External ear normal.      Left Ear: External ear normal.      Nose: Nose normal.      Mouth/Throat: "      Mouth: Mucous membranes are moist.      Pharynx: No oropharyngeal exudate or posterior oropharyngeal erythema.   Eyes:      General:         Right eye: No discharge.         Left eye: No discharge.      Conjunctiva/sclera: Conjunctivae normal.      Pupils: Pupils are equal, round, and reactive to light.   Neck:      Thyroid: No thyromegaly.   Cardiovascular:      Rate and Rhythm: Normal rate and regular rhythm.      Heart sounds: Normal heart sounds. No murmur heard.     No friction rub. No gallop.   Pulmonary:      Effort: Pulmonary effort is normal. No respiratory distress.      Breath sounds: Normal breath sounds. No stridor. No wheezing or rales.   Abdominal:      General: Bowel sounds are normal. There is no distension.      Palpations: Abdomen is soft.      Tenderness: There is no abdominal tenderness.   Musculoskeletal:      Cervical back: Normal range of motion and neck supple.   Lymphadenopathy:      Cervical: No cervical adenopathy.   Skin:     General: Skin is warm and dry.      Findings: No erythema or rash.   Neurological:      Mental Status: She is alert and oriented to person, place, and time.   Psychiatric:         Behavior: Behavior normal.         Thought Content: Thought content normal.         Judgment: Judgment normal.

## 2025-03-03 ENCOUNTER — OFFICE VISIT (OUTPATIENT)
Dept: INTERNAL MEDICINE CLINIC | Facility: OTHER | Age: 40
End: 2025-03-03
Payer: COMMERCIAL

## 2025-03-03 VITALS
WEIGHT: 151.8 LBS | RESPIRATION RATE: 16 BRPM | HEART RATE: 87 BPM | TEMPERATURE: 98 F | SYSTOLIC BLOOD PRESSURE: 122 MMHG | BODY MASS INDEX: 27.94 KG/M2 | HEIGHT: 62 IN | DIASTOLIC BLOOD PRESSURE: 80 MMHG | OXYGEN SATURATION: 97 %

## 2025-03-03 DIAGNOSIS — M54.50 ACUTE RIGHT-SIDED LOW BACK PAIN WITHOUT SCIATICA: ICD-10-CM

## 2025-03-03 DIAGNOSIS — M54.6 ACUTE RIGHT-SIDED THORACIC BACK PAIN: ICD-10-CM
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
## 2025-03-03 DIAGNOSIS — M54.2 NECK PAIN: ICD-10-CM

## 2025-03-03 DIAGNOSIS — R51.9 ACUTE NONINTRACTABLE HEADACHE, UNSPECIFIED HEADACHE TYPE: ICD-10-CM

## 2025-03-03 PROCEDURE — 99214 OFFICE O/P EST MOD 30 MIN: CPT

## 2025-03-03 RX ORDER — MELOXICAM 15 MG/1
15 TABLET ORAL DAILY PRN
Qty: 30 TABLET | Refills: 0 | Status: SHIPPED | OUTPATIENT
Start: 2025-03-03

## 2025-03-03 RX ORDER — METHOCARBAMOL 500 MG/1
500 TABLET, FILM COATED ORAL 3 TIMES DAILY PRN
Qty: 30 TABLET | Refills: 0 | Status: SHIPPED | OUTPATIENT
Start: 2025-03-03
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
## 2025-05-08 ENCOUNTER — NURSE TRIAGE (OUTPATIENT)
Age: 40
End: 2025-05-08

## 2025-05-08 ENCOUNTER — PATIENT MESSAGE (OUTPATIENT)
Dept: OBGYN CLINIC | Facility: CLINIC | Age: 40
End: 2025-05-08

## 2025-05-08 NOTE — TELEPHONE ENCOUNTER
"FOLLOW UP: Appointment scheduled.     REASON FOR CONVERSATION: Vaginitis    SYMPTOMS: Occasional vaginal burning, slight foul odor, light pink/red watery discharge.    OTHER: Patient reports vaginitis symptoms for past 3 days since using scented lubrication with intercourse. Patient not sure what is causing symptoms. Advised appointment for evaluation, patient agreeable. Advised patient call back with worsening symptoms.     DISPOSITION: See Within 3 Days in Office    Reason for Disposition  • Vaginal odor (bad smell) not improved > 3 days following Care Advice    Answer Assessment - Initial Assessment Questions  1. SYMPTOM: \"What's the main symptom you're concerned about?\" (e.g., pain, itching, dryness)      Occasional vaginal burning, slight foul odor, light pink/red watery discharge.   2. LOCATION: \"Where is the  s/s located?\" (e.g., inside/outside, left/right)      Inside vaginal canal   3. ONSET: \"When did the  s/s  start?\"      3 days ago  4. PAIN: \"Is there any pain?\" If Yes, ask: \"How bad is it?\" (Scale: 1-10; mild, moderate, severe)      Denies pain  5. ITCHING: \"Is there any itching?\" If Yes, ask: \"How bad is it?\" (Scale: 1-10; mild, moderate, severe)      Denies  6. CAUSE: \"What do you think is causing the discharge?\" \"Have you had the same problem before?\" \"What happened then?\"      Patient unsure  7. OTHER SYMPTOMS: \"Do you have any other symptoms?\" (e.g., fever, itching, vaginal bleeding, pain with urination, injury to genital area, vaginal foreign body)      Light pink watery discharge. Denies dysuria.  8. PREGNANCY: \"Is there any chance you are pregnant?\" \"When was your last menstrual period?\"      LMP 4/25    Protocols used: Vaginal Symptoms-Adult-[place]    "

## 2025-05-08 NOTE — TELEPHONE ENCOUNTER
"RN, please see the thread in patient MYC msg encounter to determine if script is appropriate for bacterial vaginosis.   \"My  and I tried scented lube, which I now realize was not a good idea. Since then I have been having slight vaginal burning, a very slight odor and a light red/pink watery discharge \"    Last seen in office 10/2/24    Thank you   "

## 2025-05-09 ENCOUNTER — OFFICE VISIT (OUTPATIENT)
Dept: OBGYN CLINIC | Facility: CLINIC | Age: 40
End: 2025-05-09
Payer: COMMERCIAL

## 2025-05-09 VITALS — WEIGHT: 151 LBS | BODY MASS INDEX: 27.62 KG/M2 | SYSTOLIC BLOOD PRESSURE: 124 MMHG | DIASTOLIC BLOOD PRESSURE: 78 MMHG

## 2025-05-09 DIAGNOSIS — N76.4 LABIAL ABSCESS: ICD-10-CM

## 2025-05-09 DIAGNOSIS — N89.8 VAGINAL DISCHARGE: Primary | ICD-10-CM

## 2025-05-09 LAB
BV WHIFF TEST VAG QL: NORMAL
CLUE CELLS SPEC QL WET PREP: NORMAL
PH SMN: 5.5 [PH]
T VAGINALIS VAG QL WET PREP: NORMAL
YEAST VAG QL WET PREP: NORMAL

## 2025-05-09 PROCEDURE — 99214 OFFICE O/P EST MOD 30 MIN: CPT | Performed by: OBSTETRICS & GYNECOLOGY

## 2025-05-09 PROCEDURE — 87210 SMEAR WET MOUNT SALINE/INK: CPT | Performed by: OBSTETRICS & GYNECOLOGY

## 2025-05-09 RX ORDER — METRONIDAZOLE 500 MG/1
500 TABLET ORAL EVERY 12 HOURS SCHEDULED
Qty: 14 TABLET | Refills: 0 | Status: SHIPPED | OUTPATIENT
Start: 2025-05-09 | End: 2025-05-16

## 2025-05-09 NOTE — PROGRESS NOTES
Name: Deedee Robins      : 1985      MRN: [id number]  Encounter Provider: Rosa Maria Steele DO  Encounter Date: 2025   Encounter department: [facility] [place]  :  Assessment & Plan  Vaginal discharge  Exam findings and wet mount reviewed with pt- RX for BV provided. Reviewed avoiding alcohol consumption during treatment      Orders:    POCT wet mount    metroNIDAZOLE (FLAGYL) 500 mg tablet; Take 1 tablet (500 mg total) by mouth every 12 (twelve) hours for 7 days    Labial abscess  Small 1 cm abscess noted with scant purulent drainage noted, able to express on palpation  Reviewed that will continue to spontaneously drainage- encouraged warm compresses to area and to f/u in the office if re-accumulates/stops drainage         History of Present Illness     HPI    Deedee Robins is a 40 y.o.   who presents today for vaginitis.     Started around 3 days ago after using a scented product.   Also felt a bump- on her right labia. This was non-tender, and resolved on its own.   She did note some drainage from the area.     Has not tried anything- no OTC treatments.   Discharge is very watery, has no odor.   Light pink/red colored discharge.     Denies any new partner or concern for STI.     History obtained from: patient    Review of Systems  Negative unless otherwise noted in HPI.     Past Medical History   Past Medical History:   Diagnosis Date    Abnormal Pap smear of cervix 9/3/2009    Allergic     Gastroenteritis     last assessed: 16    Gestational diabetes 2018    Headache(784.0) 22    Hypothyroidism 2018    Plantar fasciitis, right     last assessed: 04/16/15    PONV (postoperative nausea and vomiting)     Seizures (HCC)     last assessed: 08/15/17    Thyroid disease     last assessed: 08/15/17    Viral gastroenteritis     last assessed: 17     Past Surgical History:   Procedure Laterality Date    BREAST BIOPSY Right  2022     SECTION      MOUTH SURGERY Bilateral     UT  DELIVERY ONLY N/A 2018    Procedure:  SECTION () REPEAT;  Surgeon: Tenisha Forrester DO;  Location: [facility];  Service: Obstetrics    UT EXCISION GANGLION WRIST DORSAL/VOLAR PRIMARY Left 03/15/2018    Procedure: VOLAR WRIST GANGLION CYST EXCISION;  Surgeon: Meet Sheldon MD;  Location:  MAIN OR;  Service: Orthopedics    TUBAL LIGATION Bilateral 2018    Procedure: LIGATION/COAGULATION TUBAL;  Surgeon: Tenisha Forrester DO;  Location: [facility];  Service: Obstetrics    US BREAST CLIP NEEDLE LOC RIGHT Right 2022    US GUIDED BREAST BIOPSY RIGHT COMPLETE Right 2022    WISDOM TOOTH EXTRACTION       Family History   Problem Relation Age of Onset    No Known Problems Mother     No Known Problems Father     Cancer Maternal Grandmother     Thyroid cancer Maternal Grandmother     Skin cancer Maternal Grandfather     Cancer Maternal Grandfather     Diabetes Paternal Grandmother     No Known Problems Paternal Grandfather     No Known Problems Son     No Known Problems Son     No Known Problems Maternal Aunt     No Known Problems Paternal Aunt     No Known Problems Brother     No Known Problems Brother       reports that she has never smoked. She has never used smokeless tobacco. She reports current alcohol use of about 1.0 standard drink of alcohol per week. She reports that she does not use drugs.  Current Outpatient Medications   Medication Instructions    cetirizine (ZYRTEC) 10 mg, Oral, Daily    fluticasone (FLONASE) 50 mcg/act nasal spray 2 sprays, Nasal, Daily PRN    Fluticasone-Salmeterol (Advair) 100-50 mcg/dose inhaler 1 puff, Inhalation, 2 times daily, Rinse mouth after use.    hydrOXYzine HCL (ATARAX) 25 mg, Oral, Every 6 hours PRN    levothyroxine 50 mcg, Oral, Daily    meloxicam (MOBIC) 15 mg, Oral, Daily PRN    methocarbamol (ROBAXIN) 500 mg, Oral, 3 times daily PRN    metroNIDAZOLE (FLAGYL) 500 mg, Oral,  Every 12 hours scheduled    montelukast (SINGULAIR) 10 mg, Oral, Daily at bedtime    phentermine 30 mg, Oral, Every morning    valACYclovir (VALTREX) 2,000 mg, Oral, 2 times daily     Allergies   Allergen Reactions    Seasonal Ic [Cholestatin] Itching     Itchy eyes, congestion         Objective   /78 (BP Location: Left arm, Patient Position: Sitting, Cuff Size: Standard)   Wt 68.5 kg (151 lb)   LMP 04/25/2025 (Exact Date)   BMI 27.62 kg/m²      Physical Exam  Constitutional:       General: She is not in acute distress.  HENT:      Head: Normocephalic and atraumatic.      Mouth/Throat:      Mouth: Mucous membranes are moist.   Cardiovascular:      Rate and Rhythm: Normal rate.   Pulmonary:      Effort: Pulmonary effort is normal. No respiratory distress.   Abdominal:      General: There is no distension.      Palpations: Abdomen is soft.   Genitourinary:     Labia:         Right: Lesion present.         Left: No tenderness or lesion.       Vagina: Vaginal discharge (scant blood tinged discharge) present.      Cervix: No friability.      Uterus: Not enlarged and not tender.       Adnexa:         Right: No tenderness or fullness.          Left: No tenderness or fullness.         Skin:     General: Skin is warm and dry.   Neurological:      Mental Status: She is alert.   Psychiatric:         Mood and Affect: Mood normal.         Behavior: Behavior normal.

## 2025-06-25 DIAGNOSIS — B00.1 HERPES LABIALIS WITHOUT COMPLICATION: ICD-10-CM

## 2025-06-25 RX ORDER — VALACYCLOVIR HYDROCHLORIDE 1 G/1
2000 TABLET, FILM COATED ORAL 2 TIMES DAILY
Qty: 4 TABLET | Refills: 0 | Status: SHIPPED | OUTPATIENT
Start: 2025-06-25 | End: 2025-06-26

## 2025-07-02 DIAGNOSIS — E03.9 HYPOTHYROIDISM, UNSPECIFIED TYPE: ICD-10-CM

## 2025-07-03 RX ORDER — LEVOTHYROXINE SODIUM 50 UG/1
50 TABLET ORAL DAILY
Qty: 90 TABLET | Refills: 0 | Status: SHIPPED | OUTPATIENT
Start: 2025-07-03

## 2025-07-18 ENCOUNTER — APPOINTMENT (OUTPATIENT)
Dept: LAB | Facility: IMAGING CENTER | Age: 40
End: 2025-07-18
Payer: COMMERCIAL

## 2025-07-18 ENCOUNTER — APPOINTMENT (OUTPATIENT)
Dept: LAB | Facility: IMAGING CENTER | Age: 40
End: 2025-07-18

## 2025-07-18 DIAGNOSIS — Z00.8 HEALTH EXAMINATION IN POPULATION SURVEY: ICD-10-CM

## 2025-07-18 DIAGNOSIS — Z13.228 SCREENING FOR METABOLIC DISORDER: ICD-10-CM

## 2025-07-18 LAB
ALBUMIN SERPL BCG-MCNC: 4.3 G/DL (ref 3.5–5)
ALP SERPL-CCNC: 51 U/L (ref 34–104)
ALT SERPL W P-5'-P-CCNC: 11 U/L (ref 7–52)
ANION GAP SERPL CALCULATED.3IONS-SCNC: 7 MMOL/L (ref 4–13)
AST SERPL W P-5'-P-CCNC: 15 U/L (ref 13–39)
BASOPHILS # BLD AUTO: 0.03 THOUSANDS/ÂΜL (ref 0–0.1)
BASOPHILS NFR BLD AUTO: 1 % (ref 0–1)
BILIRUB SERPL-MCNC: 0.38 MG/DL (ref 0.2–1)
BUN SERPL-MCNC: 21 MG/DL (ref 5–25)
CALCIUM SERPL-MCNC: 9.5 MG/DL (ref 8.4–10.2)
CHLORIDE SERPL-SCNC: 102 MMOL/L (ref 96–108)
CHOLEST SERPL-MCNC: 133 MG/DL (ref ?–200)
CO2 SERPL-SCNC: 29 MMOL/L (ref 21–32)
CREAT SERPL-MCNC: 0.95 MG/DL (ref 0.6–1.3)
EOSINOPHIL # BLD AUTO: 0.11 THOUSAND/ÂΜL (ref 0–0.61)
EOSINOPHIL NFR BLD AUTO: 2 % (ref 0–6)
ERYTHROCYTE [DISTWIDTH] IN BLOOD BY AUTOMATED COUNT: 12.6 % (ref 11.6–15.1)
EST. AVERAGE GLUCOSE BLD GHB EST-MCNC: 111 MG/DL
GFR SERPL CREATININE-BSD FRML MDRD: 74 ML/MIN/1.73SQ M
GLUCOSE P FAST SERPL-MCNC: 87 MG/DL (ref 65–99)
HBA1C MFR BLD: 5.5 %
HCT VFR BLD AUTO: 39.4 % (ref 34.8–46.1)
HDLC SERPL-MCNC: 55 MG/DL
HGB BLD-MCNC: 13 G/DL (ref 11.5–15.4)
IMM GRANULOCYTES # BLD AUTO: 0.02 THOUSAND/UL (ref 0–0.2)
IMM GRANULOCYTES NFR BLD AUTO: 0 % (ref 0–2)
LDLC SERPL CALC-MCNC: 63 MG/DL (ref 0–100)
LYMPHOCYTES # BLD AUTO: 2.27 THOUSANDS/ÂΜL (ref 0.6–4.47)
LYMPHOCYTES NFR BLD AUTO: 34 % (ref 14–44)
MCH RBC QN AUTO: 30.2 PG (ref 26.8–34.3)
MCHC RBC AUTO-ENTMCNC: 33 G/DL (ref 31.4–37.4)
MCV RBC AUTO: 91 FL (ref 82–98)
MONOCYTES # BLD AUTO: 0.51 THOUSAND/ÂΜL (ref 0.17–1.22)
MONOCYTES NFR BLD AUTO: 8 % (ref 4–12)
NEUTROPHILS # BLD AUTO: 3.72 THOUSANDS/ÂΜL (ref 1.85–7.62)
NEUTS SEG NFR BLD AUTO: 55 % (ref 43–75)
NONHDLC SERPL-MCNC: 78 MG/DL
NRBC BLD AUTO-RTO: 0 /100 WBCS
PLATELET # BLD AUTO: 212 THOUSANDS/UL (ref 149–390)
PMV BLD AUTO: 11.5 FL (ref 8.9–12.7)
POTASSIUM SERPL-SCNC: 4.3 MMOL/L (ref 3.5–5.3)
PROT SERPL-MCNC: 7.4 G/DL (ref 6.4–8.4)
RBC # BLD AUTO: 4.31 MILLION/UL (ref 3.81–5.12)
SODIUM SERPL-SCNC: 138 MMOL/L (ref 135–147)
TRIGL SERPL-MCNC: 77 MG/DL (ref ?–150)
TSH SERPL DL<=0.05 MIU/L-ACNC: 3 UIU/ML (ref 0.45–4.5)
WBC # BLD AUTO: 6.66 THOUSAND/UL (ref 4.31–10.16)

## 2025-07-18 NOTE — ASSESSMENT & PLAN NOTE
Will get updated TSH  Continue levothyroxine 50 mcg tablet daily     Orders:    TSH, 3rd generation with Free T4 reflex     1-2 drinks

## (undated) DEVICE — SUT VICRYL 0 CT-1 36 IN J946H

## (undated) DEVICE — GAUZE SPONGES,16 PLY: Brand: CURITY

## (undated) DEVICE — PADDING CAST 4 IN  COTTON STRL

## (undated) DEVICE — INTENDED FOR TISSUE SEPARATION, AND OTHER PROCEDURES THAT REQUIRE A SHARP SURGICAL BLADE TO PUNCTURE OR CUT.: Brand: BARD-PARKER SAFETY BLADES SIZE 15, STERILE

## (undated) DEVICE — SPONGE PVP SCRUB WING STERILE

## (undated) DEVICE — 3M™ STERI-STRIP™ REINFORCED ADHESIVE SKIN CLOSURES, R1547, 1/2 IN X 4 IN (12 MM X 100 MM), 6 STRIPS/ENVELOPE: Brand: 3M™ STERI-STRIP™

## (undated) DEVICE — CHLORAPREP HI-LITE 26ML ORANGE

## (undated) DEVICE — SUT CHROMIC 2-0 CT-1 27 IN 811H

## (undated) DEVICE — GLOVE SRG BIOGEL 7.5

## (undated) DEVICE — ADHESIVE SKN CLSR HISTOACRYL FLEX 0.5ML LF

## (undated) DEVICE — ACE WRAP 3 IN VELCRO LATEX FREE

## (undated) DEVICE — GLOVE SRG BIOGEL 8

## (undated) DEVICE — STERI STRIP 1/2 INCH

## (undated) DEVICE — SUT PLAIN 2-0 CTX 27 IN 872H

## (undated) DEVICE — SUT MONOCRYL 4-0 PS-2 27 IN Y426H

## (undated) DEVICE — GLOVE SRG BIOGEL 6.5

## (undated) DEVICE — SUT MONOCRYL 4-0 P-3 18 IN Y494G

## (undated) DEVICE — GLOVE INDICATOR PI UNDERGLOVE SZ 8 BLUE

## (undated) DEVICE — GLOVE INDICATOR PI UNDERGLOVE SZ 6.5 BLUE

## (undated) DEVICE — PACK PLASTIC HAND PBDS

## (undated) DEVICE — GLOVE SRG BIOGEL ECLIPSE 7

## (undated) DEVICE — OCCLUSIVE GAUZE STRIP,3% BISMUTH TRIBROMOPHENATE IN PETROLATUM BLEND: Brand: XEROFORM